# Patient Record
Sex: FEMALE | Race: WHITE | NOT HISPANIC OR LATINO | Employment: OTHER | URBAN - METROPOLITAN AREA
[De-identification: names, ages, dates, MRNs, and addresses within clinical notes are randomized per-mention and may not be internally consistent; named-entity substitution may affect disease eponyms.]

---

## 2023-08-23 ENCOUNTER — NURSING HOME VISIT (OUTPATIENT)
Dept: POST ACUTE CARE | Facility: EXTERNAL LOCATION | Age: 88
End: 2023-08-23
Payer: MEDICARE

## 2023-08-23 DIAGNOSIS — K21.9 GASTROESOPHAGEAL REFLUX DISEASE WITHOUT ESOPHAGITIS: ICD-10-CM

## 2023-08-23 DIAGNOSIS — S72.002S CLOSED FRACTURE OF LEFT HIP, SEQUELA: Primary | ICD-10-CM

## 2023-08-23 DIAGNOSIS — I25.83 CORONARY ARTERY DISEASE DUE TO LIPID RICH PLAQUE: ICD-10-CM

## 2023-08-23 DIAGNOSIS — I25.10 CORONARY ARTERY DISEASE DUE TO LIPID RICH PLAQUE: ICD-10-CM

## 2023-08-23 DIAGNOSIS — I48.91 ATRIAL FIBRILLATION, UNSPECIFIED TYPE (MULTI): ICD-10-CM

## 2023-08-23 DIAGNOSIS — I10 PRIMARY HYPERTENSION: ICD-10-CM

## 2023-08-23 PROCEDURE — 99305 1ST NF CARE MODERATE MDM 35: CPT | Performed by: STUDENT IN AN ORGANIZED HEALTH CARE EDUCATION/TRAINING PROGRAM

## 2023-08-23 NOTE — LETTER
Patient: Noemí Hinton  : 1928    Encounter Date: 2023    Subjective  Patient ID: Noemí Hinton is a 89 y.o. female.    Pt is a 89 year old female with CAD, HTN, HLD, afib, OA, GERD who presented to [facility] s/p fall. Patient was found to have a hip fracture after a mechanical fall. Underwent surgical fixation and had non complicated post operative course. Patient was admitted to snf for further management. On evaluation, patient is overall doing well. She regards no acute complaints. Working well with therapy.         Review of Systems   Constitutional: Negative.    HENT: Negative.     Respiratory: Negative.     Cardiovascular: Negative.    Gastrointestinal: Negative.    Musculoskeletal: Negative.    Neurological: Negative.    Psychiatric/Behavioral: Negative.         ObjectiveVitals Reviewed via facility EMR   Physical Exam  Constitutional:       General: She is not in acute distress.     Appearance: She is not ill-appearing.   HENT:      Mouth/Throat:      Mouth: Mucous membranes are moist.      Pharynx: Oropharynx is clear. No oropharyngeal exudate or posterior oropharyngeal erythema.   Eyes:      Pupils: Pupils are equal, round, and reactive to light.   Cardiovascular:      Rate and Rhythm: Normal rate and regular rhythm.      Heart sounds: No murmur heard.  Pulmonary:      Effort: Pulmonary effort is normal. No respiratory distress.      Breath sounds: No wheezing.   Abdominal:      General: Abdomen is flat. Bowel sounds are normal. There is no distension.      Palpations: Abdomen is soft.      Tenderness: There is no abdominal tenderness.   Musculoskeletal:         General: No tenderness. Normal range of motion.   Skin:     General: Skin is warm and dry.   Neurological:      General: No focal deficit present.      Mental Status: She is alert and oriented to person, place, and time. Mental status is at baseline.   Psychiatric:         Mood and Affect: Mood normal.          Assessment/Plan  Diagnoses and all orders for this visit:  Closed fracture of left hip, sequela  Gastroesophageal reflux disease without esophagitis  Atrial fibrillation, unspecified type (CMS/HCC)  Coronary artery disease due to lipid rich plaque  Primary hypertension    Pt seen and examined, overall medically stable, hospital course reviewed, continue pt/ot, supportive care, routine labs      Electronically Signed By: Talon Cullen DO   8/24/23  9:48 PM

## 2023-08-24 PROBLEM — I10 PRIMARY HYPERTENSION: Status: ACTIVE | Noted: 2023-08-24

## 2023-08-24 PROBLEM — I48.91 ATRIAL FIBRILLATION (MULTI): Status: ACTIVE | Noted: 2023-08-24

## 2023-08-24 PROBLEM — K21.9 GERD (GASTROESOPHAGEAL REFLUX DISEASE): Status: ACTIVE | Noted: 2023-08-24

## 2023-08-24 PROBLEM — I25.10 CORONARY ARTERY DISEASE DUE TO LIPID RICH PLAQUE: Status: ACTIVE | Noted: 2023-08-24

## 2023-08-24 PROBLEM — S72.002A HIP FRACTURE, LEFT (MULTI): Status: ACTIVE | Noted: 2023-08-24

## 2023-08-24 PROBLEM — I25.83 CORONARY ARTERY DISEASE DUE TO LIPID RICH PLAQUE: Status: ACTIVE | Noted: 2023-08-24

## 2023-08-24 ASSESSMENT — ENCOUNTER SYMPTOMS
GASTROINTESTINAL NEGATIVE: 1
RESPIRATORY NEGATIVE: 1
CONSTITUTIONAL NEGATIVE: 1
PSYCHIATRIC NEGATIVE: 1
CARDIOVASCULAR NEGATIVE: 1
MUSCULOSKELETAL NEGATIVE: 1
NEUROLOGICAL NEGATIVE: 1

## 2023-08-25 ENCOUNTER — NURSING HOME VISIT (OUTPATIENT)
Dept: POST ACUTE CARE | Facility: EXTERNAL LOCATION | Age: 88
End: 2023-08-25
Payer: MEDICARE

## 2023-08-25 DIAGNOSIS — I25.83 CORONARY ARTERY DISEASE DUE TO LIPID RICH PLAQUE: ICD-10-CM

## 2023-08-25 DIAGNOSIS — I48.91 ATRIAL FIBRILLATION, UNSPECIFIED TYPE (MULTI): ICD-10-CM

## 2023-08-25 DIAGNOSIS — S72.002S CLOSED FRACTURE OF LEFT HIP, SEQUELA: ICD-10-CM

## 2023-08-25 DIAGNOSIS — I25.10 CORONARY ARTERY DISEASE DUE TO LIPID RICH PLAQUE: ICD-10-CM

## 2023-08-25 DIAGNOSIS — K21.9 GASTROESOPHAGEAL REFLUX DISEASE WITHOUT ESOPHAGITIS: ICD-10-CM

## 2023-08-25 DIAGNOSIS — I10 PRIMARY HYPERTENSION: Primary | ICD-10-CM

## 2023-08-25 PROCEDURE — 99308 SBSQ NF CARE LOW MDM 20: CPT | Performed by: STUDENT IN AN ORGANIZED HEALTH CARE EDUCATION/TRAINING PROGRAM

## 2023-08-25 NOTE — LETTER
Patient: Noemí Hinton  : 1928    Encounter Date: 2023    Subjective  Patient ID: Noemí Hinton is a 89 y.o. female.    Pt seen and examined. Overall medically stable. She regards that she is working well with therapy and symptoms are overall stable. Pain well controlled. Heel pain is stable and was found to have heel spurs. Regards no additional issues.        Review of Systems   Constitutional: Negative.    HENT: Negative.     Respiratory: Negative.     Cardiovascular: Negative.    Gastrointestinal: Negative.    Musculoskeletal: Negative.    Neurological: Negative.    Psychiatric/Behavioral: Negative.         ObjectiveVitals Reviewed via facility EMR   Physical Exam  Constitutional:       General: She is not in acute distress.     Appearance: She is not ill-appearing.   HENT:      Mouth/Throat:      Mouth: Mucous membranes are moist.      Pharynx: Oropharynx is clear. No oropharyngeal exudate or posterior oropharyngeal erythema.   Eyes:      Pupils: Pupils are equal, round, and reactive to light.   Cardiovascular:      Rate and Rhythm: Normal rate and regular rhythm.      Heart sounds: No murmur heard.  Pulmonary:      Effort: Pulmonary effort is normal. No respiratory distress.      Breath sounds: No wheezing.   Abdominal:      General: Abdomen is flat. Bowel sounds are normal. There is no distension.      Palpations: Abdomen is soft.      Tenderness: There is no abdominal tenderness.   Musculoskeletal:         General: No tenderness. Normal range of motion.   Skin:     General: Skin is warm and dry.   Neurological:      General: No focal deficit present.      Mental Status: She is alert and oriented to person, place, and time. Mental status is at baseline.   Psychiatric:         Mood and Affect: Mood normal.         Assessment/Plan  Diagnoses and all orders for this visit:  Primary hypertension  Closed fracture of left hip, sequela  Gastroesophageal reflux disease without esophagitis  Atrial  fibrillation, unspecified type (CMS/HCC)  Coronary artery disease due to lipid rich plaque    pt seen and examined, overall medically stable. Advise follow up with podiatry due to heel spurs. Continue supportive care, routine labs, pt/ot        Electronically Signed By: Talon Cullen DO   8/27/23  8:15 PM

## 2023-08-25 NOTE — PROGRESS NOTES
Subjective   Patient ID: Noemí Hniton is a 89 y.o. female.    Pt is a 89 year old female with CAD, HTN, HLD, afib, OA, GERD who presented to [facility] s/p fall. Patient was found to have a hip fracture after a mechanical fall. Underwent surgical fixation and had non complicated post operative course. Patient was admitted to snf for further management. On evaluation, patient is overall doing well. She regards no acute complaints. Working well with therapy.         Review of Systems   Constitutional: Negative.    HENT: Negative.     Respiratory: Negative.     Cardiovascular: Negative.    Gastrointestinal: Negative.    Musculoskeletal: Negative.    Neurological: Negative.    Psychiatric/Behavioral: Negative.         Objective Vitals Reviewed via facility EMR   Physical Exam  Constitutional:       General: She is not in acute distress.     Appearance: She is not ill-appearing.   HENT:      Mouth/Throat:      Mouth: Mucous membranes are moist.      Pharynx: Oropharynx is clear. No oropharyngeal exudate or posterior oropharyngeal erythema.   Eyes:      Pupils: Pupils are equal, round, and reactive to light.   Cardiovascular:      Rate and Rhythm: Normal rate and regular rhythm.      Heart sounds: No murmur heard.  Pulmonary:      Effort: Pulmonary effort is normal. No respiratory distress.      Breath sounds: No wheezing.   Abdominal:      General: Abdomen is flat. Bowel sounds are normal. There is no distension.      Palpations: Abdomen is soft.      Tenderness: There is no abdominal tenderness.   Musculoskeletal:         General: No tenderness. Normal range of motion.   Skin:     General: Skin is warm and dry.   Neurological:      General: No focal deficit present.      Mental Status: She is alert and oriented to person, place, and time. Mental status is at baseline.   Psychiatric:         Mood and Affect: Mood normal.         Assessment/Plan   Diagnoses and all orders for this visit:  Closed fracture of  left hip, sequela  Gastroesophageal reflux disease without esophagitis  Atrial fibrillation, unspecified type (CMS/HCC)  Coronary artery disease due to lipid rich plaque  Primary hypertension    Pt seen and examined, overall medically stable, hospital course reviewed, continue pt/ot, supportive care, routine labs

## 2023-08-27 ASSESSMENT — ENCOUNTER SYMPTOMS
NEUROLOGICAL NEGATIVE: 1
CARDIOVASCULAR NEGATIVE: 1
CONSTITUTIONAL NEGATIVE: 1
GASTROINTESTINAL NEGATIVE: 1
MUSCULOSKELETAL NEGATIVE: 1
RESPIRATORY NEGATIVE: 1
PSYCHIATRIC NEGATIVE: 1

## 2023-08-28 NOTE — PROGRESS NOTES
Subjective   Patient ID: Noemí Hinton is a 89 y.o. female.    Pt seen and examined. Overall medically stable. She regards that she is working well with therapy and symptoms are overall stable. Pain well controlled. Heel pain is stable and was found to have heel spurs. Regards no additional issues.        Review of Systems   Constitutional: Negative.    HENT: Negative.     Respiratory: Negative.     Cardiovascular: Negative.    Gastrointestinal: Negative.    Musculoskeletal: Negative.    Neurological: Negative.    Psychiatric/Behavioral: Negative.         Objective Vitals Reviewed via facility EMR   Physical Exam  Constitutional:       General: She is not in acute distress.     Appearance: She is not ill-appearing.   HENT:      Mouth/Throat:      Mouth: Mucous membranes are moist.      Pharynx: Oropharynx is clear. No oropharyngeal exudate or posterior oropharyngeal erythema.   Eyes:      Pupils: Pupils are equal, round, and reactive to light.   Cardiovascular:      Rate and Rhythm: Normal rate and regular rhythm.      Heart sounds: No murmur heard.  Pulmonary:      Effort: Pulmonary effort is normal. No respiratory distress.      Breath sounds: No wheezing.   Abdominal:      General: Abdomen is flat. Bowel sounds are normal. There is no distension.      Palpations: Abdomen is soft.      Tenderness: There is no abdominal tenderness.   Musculoskeletal:         General: No tenderness. Normal range of motion.   Skin:     General: Skin is warm and dry.   Neurological:      General: No focal deficit present.      Mental Status: She is alert and oriented to person, place, and time. Mental status is at baseline.   Psychiatric:         Mood and Affect: Mood normal.         Assessment/Plan   Diagnoses and all orders for this visit:  Primary hypertension  Closed fracture of left hip, sequela  Gastroesophageal reflux disease without esophagitis  Atrial fibrillation, unspecified type (CMS/[facility])  Coronary artery disease due to  lipid rich plaque    pt seen and examined, overall medically stable. Advise follow up with podiatry due to heel spurs. Continue supportive care, routine labs, pt/ot

## 2023-08-30 ENCOUNTER — NURSING HOME VISIT (OUTPATIENT)
Dept: POST ACUTE CARE | Facility: EXTERNAL LOCATION | Age: 88
End: 2023-08-30
Payer: MEDICARE

## 2023-08-30 DIAGNOSIS — M25.512 LEFT SHOULDER PAIN, UNSPECIFIED CHRONICITY: ICD-10-CM

## 2023-08-30 DIAGNOSIS — K21.9 GASTROESOPHAGEAL REFLUX DISEASE WITHOUT ESOPHAGITIS: ICD-10-CM

## 2023-08-30 DIAGNOSIS — I10 PRIMARY HYPERTENSION: Primary | ICD-10-CM

## 2023-08-30 DIAGNOSIS — I48.91 ATRIAL FIBRILLATION, UNSPECIFIED TYPE (MULTI): ICD-10-CM

## 2023-08-30 DIAGNOSIS — S72.002S CLOSED FRACTURE OF LEFT HIP, SEQUELA: ICD-10-CM

## 2023-08-30 PROCEDURE — 99308 SBSQ NF CARE LOW MDM 20: CPT | Performed by: STUDENT IN AN ORGANIZED HEALTH CARE EDUCATION/TRAINING PROGRAM

## 2023-08-30 ASSESSMENT — ENCOUNTER SYMPTOMS
NEUROLOGICAL NEGATIVE: 1
MUSCULOSKELETAL NEGATIVE: 1
CARDIOVASCULAR NEGATIVE: 1
PSYCHIATRIC NEGATIVE: 1
CONSTITUTIONAL NEGATIVE: 1
RESPIRATORY NEGATIVE: 1
GASTROINTESTINAL NEGATIVE: 1

## 2023-08-30 NOTE — LETTER
Patient: Noemí Hinton  : 1928    Encounter Date: 2023    Subjective  Patient ID: Noemí Hinton is a 89 y.o. female.    Pt seen resting comfortably in bed. Regards she is working well with therapy but is having some shoulder pain. This is typical for her and she has gotten injections in the past for this. She states otherwise she has no complaints and overall feels well.        Review of Systems   Constitutional: Negative.    HENT: Negative.     Respiratory: Negative.     Cardiovascular: Negative.    Gastrointestinal: Negative.    Musculoskeletal: Negative.         Shoulder pain on L   Neurological: Negative.    Psychiatric/Behavioral: Negative.         ObjectiveVitals Reviewed via facility EMR   Physical Exam  Constitutional:       General: She is not in acute distress.     Appearance: She is not ill-appearing.   HENT:      Mouth/Throat:      Mouth: Mucous membranes are moist.      Pharynx: Oropharynx is clear. No oropharyngeal exudate or posterior oropharyngeal erythema.   Eyes:      Pupils: Pupils are equal, round, and reactive to light.   Cardiovascular:      Rate and Rhythm: Normal rate and regular rhythm.      Heart sounds: No murmur heard.  Pulmonary:      Effort: Pulmonary effort is normal. No respiratory distress.      Breath sounds: No wheezing.   Abdominal:      General: Abdomen is flat. Bowel sounds are normal. There is no distension.      Palpations: Abdomen is soft.      Tenderness: There is no abdominal tenderness.   Musculoskeletal:         General: No tenderness. Normal range of motion.      Comments: L shoulder pain otherwise generalized arthalgia with ROM   Skin:     General: Skin is warm and dry.   Neurological:      General: No focal deficit present.      Mental Status: She is alert and oriented to person, place, and time. Mental status is at baseline.   Psychiatric:         Mood and Affect: Mood normal.         Assessment/Plan  Diagnoses and all orders for this visit:  Primary  hypertension  Atrial fibrillation, unspecified type (CMS/HCC)  Gastroesophageal reflux disease without esophagitis  Closed fracture of left hip, sequela  Left shoulder pain, unspecified chronicity    Pt seen and examined, will obtain shoulder x-ray, lidocaine patch for L shoulder, referral to ortho for injection, PT/OT, otherwise medically stable      Electronically Signed By: Talon Cullen DO   8/30/23  8:43 PM

## 2023-09-06 ENCOUNTER — NURSING HOME VISIT (OUTPATIENT)
Dept: POST ACUTE CARE | Facility: EXTERNAL LOCATION | Age: 88
End: 2023-09-06
Payer: MEDICARE

## 2023-09-06 DIAGNOSIS — I25.10 CORONARY ARTERY DISEASE DUE TO LIPID RICH PLAQUE: ICD-10-CM

## 2023-09-06 DIAGNOSIS — S72.002S CLOSED FRACTURE OF LEFT HIP, SEQUELA: ICD-10-CM

## 2023-09-06 DIAGNOSIS — K21.9 GASTROESOPHAGEAL REFLUX DISEASE WITHOUT ESOPHAGITIS: ICD-10-CM

## 2023-09-06 DIAGNOSIS — R35.0 URINARY FREQUENCY: ICD-10-CM

## 2023-09-06 DIAGNOSIS — I48.91 ATRIAL FIBRILLATION, UNSPECIFIED TYPE (MULTI): ICD-10-CM

## 2023-09-06 DIAGNOSIS — I10 PRIMARY HYPERTENSION: Primary | ICD-10-CM

## 2023-09-06 DIAGNOSIS — M25.512 LEFT SHOULDER PAIN, UNSPECIFIED CHRONICITY: ICD-10-CM

## 2023-09-06 DIAGNOSIS — I25.83 CORONARY ARTERY DISEASE DUE TO LIPID RICH PLAQUE: ICD-10-CM

## 2023-09-06 DIAGNOSIS — R19.7 DIARRHEA, UNSPECIFIED TYPE: ICD-10-CM

## 2023-09-06 PROCEDURE — 99308 SBSQ NF CARE LOW MDM 20: CPT | Performed by: STUDENT IN AN ORGANIZED HEALTH CARE EDUCATION/TRAINING PROGRAM

## 2023-09-06 NOTE — LETTER
Patient: Noemí Hinton  : 1928    Encounter Date: 2023    Subjective  Patient ID: Noemí Hinton is a 89 y.o. female.    Patient seen resting comfortably in bed.  She reports that she is doing okay.  Has been having some issues with diarrhea as well as urinary frequency.  She feels fairly weak secondary to this.  Otherwise, is slowly getting better with therapy.  States no additional issues or concerns.        Review of Systems   Constitutional: Negative.    HENT: Negative.     Respiratory: Negative.     Cardiovascular: Negative.    Gastrointestinal:  Positive for diarrhea.   Genitourinary:  Positive for difficulty urinating and frequency.   Musculoskeletal: Negative.    Neurological: Negative.    Psychiatric/Behavioral: Negative.         ObjectiveVitals Reviewed via facility EMR   Physical Exam  Constitutional:       General: She is not in acute distress.     Appearance: She is not ill-appearing.      Comments: Elderly female   HENT:      Mouth/Throat:      Mouth: Mucous membranes are moist.      Pharynx: Oropharynx is clear. No oropharyngeal exudate or posterior oropharyngeal erythema.   Eyes:      Pupils: Pupils are equal, round, and reactive to light.   Cardiovascular:      Rate and Rhythm: Normal rate and regular rhythm.      Heart sounds: No murmur heard.  Pulmonary:      Effort: Pulmonary effort is normal. No respiratory distress.      Breath sounds: No wheezing.   Abdominal:      General: Abdomen is flat. Bowel sounds are normal. There is no distension.      Palpations: Abdomen is soft.      Tenderness: There is no abdominal tenderness.   Musculoskeletal:         General: No tenderness. Normal range of motion.   Skin:     General: Skin is warm and dry.   Neurological:      General: No focal deficit present.      Mental Status: She is alert and oriented to person, place, and time. Mental status is at baseline.   Psychiatric:         Mood and Affect: Mood normal.      Comments: anxious          Assessment/Plan  Diagnoses and all orders for this visit:  Primary hypertension  Atrial fibrillation, unspecified type (CMS/HCC)  Left shoulder pain, unspecified chronicity  Gastroesophageal reflux disease without esophagitis  Closed fracture of left hip, sequela  Coronary artery disease due to lipid rich plaque      Patient seen and examined, lab results reviewed and overall stable.  We will check a C. difficile as well as a urine dip to rule out any potential infectious etiology causing patient's GI discomfort although low suspicion as patient does not have a white count.  In addition due to persistent diarrhea, we will discontinue patient's bowel regimen.  Continue supportive care.  Routine labs.      Electronically Signed By: Talon Cullen DO   9/7/23  9:19 PM

## 2023-09-07 PROBLEM — R35.0 URINARY FREQUENCY: Status: ACTIVE | Noted: 2023-09-07

## 2023-09-07 PROBLEM — R19.7 DIARRHEA: Status: ACTIVE | Noted: 2023-09-07

## 2023-09-07 ASSESSMENT — ENCOUNTER SYMPTOMS
DIFFICULTY URINATING: 1
CARDIOVASCULAR NEGATIVE: 1
MUSCULOSKELETAL NEGATIVE: 1
RESPIRATORY NEGATIVE: 1
PSYCHIATRIC NEGATIVE: 1
FREQUENCY: 1
NEUROLOGICAL NEGATIVE: 1
DIARRHEA: 1
CONSTITUTIONAL NEGATIVE: 1

## 2023-09-08 NOTE — PROGRESS NOTES
Subjective   Patient ID: Noemí Hinton is a 89 y.o. female.    Patient seen resting comfortably in bed.  She reports that she is doing okay.  Has been having some issues with diarrhea as well as urinary frequency.  She feels fairly weak secondary to this.  Otherwise, is slowly getting better with therapy.  States no additional issues or concerns.        Review of Systems   Constitutional: Negative.    HENT: Negative.     Respiratory: Negative.     Cardiovascular: Negative.    Gastrointestinal:  Positive for diarrhea.   Genitourinary:  Positive for difficulty urinating and frequency.   Musculoskeletal: Negative.    Neurological: Negative.    Psychiatric/Behavioral: Negative.         Objective Vitals Reviewed via facility EMR   Physical Exam  Constitutional:       General: She is not in acute distress.     Appearance: She is not ill-appearing.      Comments: Elderly female   HENT:      Mouth/Throat:      Mouth: Mucous membranes are moist.      Pharynx: Oropharynx is clear. No oropharyngeal exudate or posterior oropharyngeal erythema.   Eyes:      Pupils: Pupils are equal, round, and reactive to light.   Cardiovascular:      Rate and Rhythm: Normal rate and regular rhythm.      Heart sounds: No murmur heard.  Pulmonary:      Effort: Pulmonary effort is normal. No respiratory distress.      Breath sounds: No wheezing.   Abdominal:      General: Abdomen is flat. Bowel sounds are normal. There is no distension.      Palpations: Abdomen is soft.      Tenderness: There is no abdominal tenderness.   Musculoskeletal:         General: No tenderness. Normal range of motion.   Skin:     General: Skin is warm and dry.   Neurological:      General: No focal deficit present.      Mental Status: She is alert and oriented to person, place, and time. Mental status is at baseline.   Psychiatric:         Mood and Affect: Mood normal.      Comments: anxious         Assessment/Plan   Diagnoses and all orders for this visit:  Primary  hypertension  Atrial fibrillation, unspecified type (CMS/HCC)  Left shoulder pain, unspecified chronicity  Gastroesophageal reflux disease without esophagitis  Closed fracture of left hip, sequela  Coronary artery disease due to lipid rich plaque      Patient seen and examined, lab results reviewed and overall stable.  We will check a C. difficile as well as a urine dip to rule out any potential infectious etiology causing patient's GI discomfort although low suspicion as patient does not have a white count.  In addition due to persistent diarrhea, we will discontinue patient's bowel regimen.  Continue supportive care.  Routine labs.

## 2023-09-11 ENCOUNTER — NURSING HOME VISIT (OUTPATIENT)
Dept: POST ACUTE CARE | Facility: EXTERNAL LOCATION | Age: 88
End: 2023-09-11
Payer: MEDICARE

## 2023-09-11 DIAGNOSIS — I48.91 ATRIAL FIBRILLATION, UNSPECIFIED TYPE (MULTI): ICD-10-CM

## 2023-09-11 DIAGNOSIS — F41.9 ANXIETY: ICD-10-CM

## 2023-09-11 DIAGNOSIS — M25.512 LEFT SHOULDER PAIN, UNSPECIFIED CHRONICITY: ICD-10-CM

## 2023-09-11 DIAGNOSIS — S72.002S CLOSED FRACTURE OF LEFT HIP, SEQUELA: ICD-10-CM

## 2023-09-11 DIAGNOSIS — I10 PRIMARY HYPERTENSION: Primary | ICD-10-CM

## 2023-09-11 PROCEDURE — 99309 SBSQ NF CARE MODERATE MDM 30: CPT | Performed by: STUDENT IN AN ORGANIZED HEALTH CARE EDUCATION/TRAINING PROGRAM

## 2023-09-11 NOTE — LETTER
Patient: Noemí Hinton  : 1928    Encounter Date: 2023    Subjective  Patient ID: Noemí Hinton is a 89 y.o. female.    Pt seen resting in bedside chair. She reports that she is doing ok but overall still feels weak. States she is slowly gaining back her strength but is frustrated with her progress. Regards that it makes her feel a bit down due to this. Regards no other acute issues or concerns.        Review of Systems   Constitutional: Negative.    HENT: Negative.     Respiratory: Negative.     Cardiovascular: Negative.    Gastrointestinal: Negative.    Musculoskeletal: Negative.    Neurological: Negative.    Psychiatric/Behavioral:  The patient is nervous/anxious.        ObjectiveVitals Reviewed via facility EMR   Physical Exam  Constitutional:       General: She is not in acute distress.     Appearance: She is not ill-appearing.   HENT:      Mouth/Throat:      Mouth: Mucous membranes are moist.      Pharynx: Oropharynx is clear. No oropharyngeal exudate or posterior oropharyngeal erythema.   Eyes:      Pupils: Pupils are equal, round, and reactive to light.   Cardiovascular:      Rate and Rhythm: Normal rate and regular rhythm.      Heart sounds: No murmur heard.  Pulmonary:      Effort: Pulmonary effort is normal. No respiratory distress.      Breath sounds: No wheezing.   Abdominal:      General: Abdomen is flat. Bowel sounds are normal. There is no distension.      Palpations: Abdomen is soft.      Tenderness: There is no abdominal tenderness.   Musculoskeletal:         General: No tenderness. Normal range of motion.   Skin:     General: Skin is warm and dry.   Neurological:      General: No focal deficit present.      Mental Status: She is alert and oriented to person, place, and time. Mental status is at baseline.   Psychiatric:         Mood and Affect: Mood normal.      Comments: Mildly worse mood and slightly agitated         Assessment/Plan  Diagnoses and all orders for this visit:  Primary  hypertension  Atrial fibrillation, unspecified type (CMS/HCC)  Left shoulder pain, unspecified chronicity  Closed fracture of left hip, sequela  Anxiety    Pt seen and examined, medically stable. Continue supportive care. Start scheduled lidocaine patches due to shoulder pain, may consider initiation of SSRI vs SNRI if patient still continues to have poor mood, closely monitor    >30 minutes spent in management of pt      Electronically Signed By: Talon Cullen DO   9/14/23  7:41 PM

## 2023-09-13 ENCOUNTER — NURSING HOME VISIT (OUTPATIENT)
Dept: POST ACUTE CARE | Facility: EXTERNAL LOCATION | Age: 88
End: 2023-09-13
Payer: MEDICARE

## 2023-09-13 DIAGNOSIS — S72.002S CLOSED FRACTURE OF LEFT HIP, SEQUELA: ICD-10-CM

## 2023-09-13 DIAGNOSIS — F41.9 ANXIETY: Primary | ICD-10-CM

## 2023-09-13 DIAGNOSIS — I48.91 ATRIAL FIBRILLATION, UNSPECIFIED TYPE (MULTI): ICD-10-CM

## 2023-09-13 DIAGNOSIS — M25.512 LEFT SHOULDER PAIN, UNSPECIFIED CHRONICITY: ICD-10-CM

## 2023-09-13 PROCEDURE — 99308 SBSQ NF CARE LOW MDM 20: CPT | Performed by: STUDENT IN AN ORGANIZED HEALTH CARE EDUCATION/TRAINING PROGRAM

## 2023-09-13 NOTE — LETTER
Patient: Noemí Hinton  : 1928    Encounter Date: 2023    Subjective  Patient ID: Noemí Hinton is a 89 y.o. female.    Pt seen and examined at bedside. She states that overall she is doing well without any complaints. Slowly working with therapy, is having some shoulder pain as well which is chronic. Regards no additional issues or concerns.        Review of Systems   Constitutional: Negative.    HENT: Negative.     Respiratory: Negative.     Cardiovascular: Negative.    Gastrointestinal: Negative.    Musculoskeletal: Negative.    Neurological: Negative.    Psychiatric/Behavioral: Negative.         ObjectiveVitals Reviewed via facility EMR   Physical Exam  Constitutional:       General: She is not in acute distress.     Appearance: She is not ill-appearing.   HENT:      Mouth/Throat:      Mouth: Mucous membranes are moist.      Pharynx: Oropharynx is clear. No oropharyngeal exudate or posterior oropharyngeal erythema.   Eyes:      Pupils: Pupils are equal, round, and reactive to light.   Cardiovascular:      Rate and Rhythm: Normal rate and regular rhythm.      Heart sounds: No murmur heard.  Pulmonary:      Effort: Pulmonary effort is normal. No respiratory distress.      Breath sounds: No wheezing.   Abdominal:      General: Abdomen is flat. Bowel sounds are normal. There is no distension.      Palpations: Abdomen is soft.      Tenderness: There is no abdominal tenderness.   Musculoskeletal:         General: No tenderness. Normal range of motion.   Skin:     General: Skin is warm and dry.   Neurological:      General: No focal deficit present.      Mental Status: She is alert and oriented to person, place, and time. Mental status is at baseline.   Psychiatric:         Mood and Affect: Mood normal.         Assessment/Plan  Diagnoses and all orders for this visit:  Anxiety  Left shoulder pain, unspecified chronicity  Closed fracture of left hip, sequela  Atrial fibrillation, unspecified type  (CMS/[facility])    Pt seen and examined, continue supportive care, labs overall stable, mood much improved since last visit, will closely monitor. continue pt/ot, no changes to meds      Electronically Signed By: Talon Cullen DO   9/14/23  9:20 PM

## 2023-09-14 PROBLEM — F41.9 ANXIETY: Status: ACTIVE | Noted: 2023-09-14

## 2023-09-14 ASSESSMENT — ENCOUNTER SYMPTOMS
MUSCULOSKELETAL NEGATIVE: 1
RESPIRATORY NEGATIVE: 1
NERVOUS/ANXIOUS: 1
CONSTITUTIONAL NEGATIVE: 1
PSYCHIATRIC NEGATIVE: 1
RESPIRATORY NEGATIVE: 1
NEUROLOGICAL NEGATIVE: 1
CONSTITUTIONAL NEGATIVE: 1
GASTROINTESTINAL NEGATIVE: 1
CARDIOVASCULAR NEGATIVE: 1
NEUROLOGICAL NEGATIVE: 1
CARDIOVASCULAR NEGATIVE: 1
MUSCULOSKELETAL NEGATIVE: 1
GASTROINTESTINAL NEGATIVE: 1

## 2023-09-14 NOTE — PROGRESS NOTES
Subjective   Patient ID: Noemí Hinton is a 89 y.o. female.    Pt seen resting in bedside chair. She reports that she is doing ok but overall still feels weak. States she is slowly gaining back her strength but is frustrated with her progress. Regards that it makes her feel a bit down due to this. Regards no other acute issues or concerns.        Review of Systems   Constitutional: Negative.    HENT: Negative.     Respiratory: Negative.     Cardiovascular: Negative.    Gastrointestinal: Negative.    Musculoskeletal: Negative.    Neurological: Negative.    Psychiatric/Behavioral:  The patient is nervous/anxious.        Objective Vitals Reviewed via facility EMR   Physical Exam  Constitutional:       General: She is not in acute distress.     Appearance: She is not ill-appearing.   HENT:      Mouth/Throat:      Mouth: Mucous membranes are moist.      Pharynx: Oropharynx is clear. No oropharyngeal exudate or posterior oropharyngeal erythema.   Eyes:      Pupils: Pupils are equal, round, and reactive to light.   Cardiovascular:      Rate and Rhythm: Normal rate and regular rhythm.      Heart sounds: No murmur heard.  Pulmonary:      Effort: Pulmonary effort is normal. No respiratory distress.      Breath sounds: No wheezing.   Abdominal:      General: Abdomen is flat. Bowel sounds are normal. There is no distension.      Palpations: Abdomen is soft.      Tenderness: There is no abdominal tenderness.   Musculoskeletal:         General: No tenderness. Normal range of motion.   Skin:     General: Skin is warm and dry.   Neurological:      General: No focal deficit present.      Mental Status: She is alert and oriented to person, place, and time. Mental status is at baseline.   Psychiatric:         Mood and Affect: Mood normal.      Comments: Mildly worse mood and slightly agitated         Assessment/Plan   Diagnoses and all orders for this visit:  Primary hypertension  Atrial fibrillation, unspecified type (CMS/HCC)  Left  shoulder pain, unspecified chronicity  Closed fracture of left hip, sequela  Anxiety    Pt seen and examined, medically stable. Continue supportive care. Start scheduled lidocaine patches due to shoulder pain, may consider initiation of SSRI vs SNRI if patient still continues to have poor mood, closely monitor    >30 minutes spent in management of pt

## 2023-09-15 NOTE — PROGRESS NOTES
Subjective   Patient ID: Noemí Hinton is a 89 y.o. female.    Pt seen and examined at bedside. She states that overall she is doing well without any complaints. Slowly working with therapy, is having some shoulder pain as well which is chronic. Regards no additional issues or concerns.        Review of Systems   Constitutional: Negative.    HENT: Negative.     Respiratory: Negative.     Cardiovascular: Negative.    Gastrointestinal: Negative.    Musculoskeletal: Negative.    Neurological: Negative.    Psychiatric/Behavioral: Negative.         Objective Vitals Reviewed via facility EMR   Physical Exam  Constitutional:       General: She is not in acute distress.     Appearance: She is not ill-appearing.   HENT:      Mouth/Throat:      Mouth: Mucous membranes are moist.      Pharynx: Oropharynx is clear. No oropharyngeal exudate or posterior oropharyngeal erythema.   Eyes:      Pupils: Pupils are equal, round, and reactive to light.   Cardiovascular:      Rate and Rhythm: Normal rate and regular rhythm.      Heart sounds: No murmur heard.  Pulmonary:      Effort: Pulmonary effort is normal. No respiratory distress.      Breath sounds: No wheezing.   Abdominal:      General: Abdomen is flat. Bowel sounds are normal. There is no distension.      Palpations: Abdomen is soft.      Tenderness: There is no abdominal tenderness.   Musculoskeletal:         General: No tenderness. Normal range of motion.   Skin:     General: Skin is warm and dry.   Neurological:      General: No focal deficit present.      Mental Status: She is alert and oriented to person, place, and time. Mental status is at baseline.   Psychiatric:         Mood and Affect: Mood normal.         Assessment/Plan   Diagnoses and all orders for this visit:  Anxiety  Left shoulder pain, unspecified chronicity  Closed fracture of left hip, sequela  Atrial fibrillation, unspecified type (CMS/[facility])    Pt seen and examined, continue supportive care, labs overall  stable, mood much improved since last visit, will closely monitor. continue pt/ot, no changes to meds

## 2023-09-18 ENCOUNTER — NURSING HOME VISIT (OUTPATIENT)
Dept: POST ACUTE CARE | Facility: EXTERNAL LOCATION | Age: 88
End: 2023-09-18
Payer: MEDICARE

## 2023-09-18 DIAGNOSIS — S72.002S CLOSED FRACTURE OF LEFT HIP, SEQUELA: ICD-10-CM

## 2023-09-18 DIAGNOSIS — T14.8XXA SKIN ABRASION: ICD-10-CM

## 2023-09-18 DIAGNOSIS — M25.512 LEFT SHOULDER PAIN, UNSPECIFIED CHRONICITY: ICD-10-CM

## 2023-09-18 DIAGNOSIS — K21.9 GASTROESOPHAGEAL REFLUX DISEASE WITHOUT ESOPHAGITIS: ICD-10-CM

## 2023-09-18 DIAGNOSIS — I48.91 ATRIAL FIBRILLATION, UNSPECIFIED TYPE (MULTI): Primary | ICD-10-CM

## 2023-09-18 DIAGNOSIS — I10 PRIMARY HYPERTENSION: ICD-10-CM

## 2023-09-18 PROCEDURE — 99308 SBSQ NF CARE LOW MDM 20: CPT | Performed by: STUDENT IN AN ORGANIZED HEALTH CARE EDUCATION/TRAINING PROGRAM

## 2023-09-18 NOTE — LETTER
Patient: Noemí Hinton  : 1928    Encounter Date: 2023    Progress Note    Subjective:  Noemí Hinton is a 89 y.o. year old female patient with PT seen and examined. She reports that she is doing well with therapy. Did have a fall over the weekend with a slight skin abrasion. She reports that she is still feeling weak and slowly improving. Regards no additional issues.        Objective  Vitals reviewed VIA Facility EMR    Review of Systems   Constitutional: Negative.    HENT: Negative.     Respiratory: Negative.     Cardiovascular: Negative.    Gastrointestinal: Negative.    Musculoskeletal: Negative.    Skin:  Positive for rash.        abraison   Neurological: Negative.    Psychiatric/Behavioral: Negative.            Physical Exam  Constitutional:       General: She is not in acute distress.     Appearance: She is not ill-appearing.   HENT:      Mouth/Throat:      Mouth: Mucous membranes are moist.      Pharynx: Oropharynx is clear. No oropharyngeal exudate or posterior oropharyngeal erythema.   Eyes:      Pupils: Pupils are equal, round, and reactive to light.   Cardiovascular:      Rate and Rhythm: Normal rate and regular rhythm.      Heart sounds: No murmur heard.  Pulmonary:      Effort: Pulmonary effort is normal. No respiratory distress.      Breath sounds: No wheezing.   Abdominal:      General: Abdomen is flat. Bowel sounds are normal. There is no distension.      Palpations: Abdomen is soft.      Tenderness: There is no abdominal tenderness.   Musculoskeletal:         General: No tenderness. Normal range of motion.   Skin:     General: Skin is warm and dry.      Comments: Slight skin tear   Neurological:      General: No focal deficit present.      Mental Status: She is alert and oriented to person, place, and time. Mental status is at baseline.   Psychiatric:         Mood and Affect: Mood normal.            Assessment/Plan  Diagnoses and all orders for this visit:  Atrial fibrillation,  unspecified type (CMS/[facility])  Closed fracture of left hip, sequela  Left shoulder pain, unspecified chronicity  Gastroesophageal reflux disease without esophagitis  Primary hypertension  Skin abrasion      PT seen and examined, medically stable, continue wound care for wound abrasion. Labs reviewed and overall stable. INR returned and overall 3.0, decreased Tuesday dosing to 2.5 mg due to recent increase of inr from 2.5 to 3.0.   continue PT/OT, labs reviewed. Staff updated on care plan      Patient fully evaluated as above:    Talon Cullen DO          Electronically Signed By: Talon Cullen DO   9/19/23  9:51 AM

## 2023-09-19 PROBLEM — T14.8XXA SKIN ABRASION: Status: ACTIVE | Noted: 2023-09-19

## 2023-09-19 ASSESSMENT — ENCOUNTER SYMPTOMS
CONSTITUTIONAL NEGATIVE: 1
MUSCULOSKELETAL NEGATIVE: 1
RESPIRATORY NEGATIVE: 1
NEUROLOGICAL NEGATIVE: 1
GASTROINTESTINAL NEGATIVE: 1
PSYCHIATRIC NEGATIVE: 1
CARDIOVASCULAR NEGATIVE: 1

## 2023-09-19 NOTE — PROGRESS NOTES
Progress Note    Subjective:  Noemí Hinton is a 89 y.o. year old female patient with PT seen and examined. She reports that she is doing well with therapy. Did have a fall over the weekend with a slight skin abrasion. She reports that she is still feeling weak and slowly improving. Regards no additional issues.        Objective   Vitals reviewed VIA Facility EMR    Review of Systems   Constitutional: Negative.    HENT: Negative.     Respiratory: Negative.     Cardiovascular: Negative.    Gastrointestinal: Negative.    Musculoskeletal: Negative.    Skin:  Positive for rash.        abraison   Neurological: Negative.    Psychiatric/Behavioral: Negative.            Physical Exam  Constitutional:       General: She is not in acute distress.     Appearance: She is not ill-appearing.   HENT:      Mouth/Throat:      Mouth: Mucous membranes are moist.      Pharynx: Oropharynx is clear. No oropharyngeal exudate or posterior oropharyngeal erythema.   Eyes:      Pupils: Pupils are equal, round, and reactive to light.   Cardiovascular:      Rate and Rhythm: Normal rate and regular rhythm.      Heart sounds: No murmur heard.  Pulmonary:      Effort: Pulmonary effort is normal. No respiratory distress.      Breath sounds: No wheezing.   Abdominal:      General: Abdomen is flat. Bowel sounds are normal. There is no distension.      Palpations: Abdomen is soft.      Tenderness: There is no abdominal tenderness.   Musculoskeletal:         General: No tenderness. Normal range of motion.   Skin:     General: Skin is warm and dry.      Comments: Slight skin tear   Neurological:      General: No focal deficit present.      Mental Status: She is alert and oriented to person, place, and time. Mental status is at baseline.   Psychiatric:         Mood and Affect: Mood normal.            Assessment/Plan   Diagnoses and all orders for this visit:  Atrial fibrillation, unspecified type (CMS/HCC)  Closed fracture of left hip, sequela  Left  shoulder pain, unspecified chronicity  Gastroesophageal reflux disease without esophagitis  Primary hypertension  Skin abrasion      PT seen and examined, medically stable, continue wound care for wound abrasion. Labs reviewed and overall stable. INR returned and overall 3.0, decreased Tuesday dosing to 2.5 mg due to recent increase of inr from 2.5 to 3.0.   continue PT/OT, labs reviewed. Staff updated on care plan      Patient fully evaluated as above:    Talon Cullen DO

## 2023-09-20 ENCOUNTER — NURSING HOME VISIT (OUTPATIENT)
Dept: POST ACUTE CARE | Facility: EXTERNAL LOCATION | Age: 88
End: 2023-09-20
Payer: MEDICARE

## 2023-09-20 DIAGNOSIS — I48.91 ATRIAL FIBRILLATION, UNSPECIFIED TYPE (MULTI): ICD-10-CM

## 2023-09-20 DIAGNOSIS — S72.002S CLOSED FRACTURE OF LEFT HIP, SEQUELA: ICD-10-CM

## 2023-09-20 DIAGNOSIS — F41.9 ANXIETY: ICD-10-CM

## 2023-09-20 DIAGNOSIS — K21.9 GASTROESOPHAGEAL REFLUX DISEASE WITHOUT ESOPHAGITIS: ICD-10-CM

## 2023-09-20 DIAGNOSIS — G62.9 NEUROPATHY: ICD-10-CM

## 2023-09-20 DIAGNOSIS — T14.8XXA SKIN ABRASION: ICD-10-CM

## 2023-09-20 DIAGNOSIS — I10 PRIMARY HYPERTENSION: Primary | ICD-10-CM

## 2023-09-20 PROCEDURE — 99308 SBSQ NF CARE LOW MDM 20: CPT | Performed by: STUDENT IN AN ORGANIZED HEALTH CARE EDUCATION/TRAINING PROGRAM

## 2023-09-20 NOTE — LETTER
Patient: Noemí Hinton  : 1928    Encounter Date: 2023    Subjective  Patient ID: Noemí Hinton is a 89 y.o. female.    Patient seen and evaluated at bedside.  She states that overall she is feeling much better, she is now taking her homeopathic medicine for neuropathy and feels much improved.  Therapy is slow-growing.  States no additional issues or concerns.        Review of Systems   Constitutional: Negative.         Improved mood   HENT: Negative.     Respiratory: Negative.     Cardiovascular: Negative.    Gastrointestinal: Negative.    Musculoskeletal: Negative.    Neurological: Negative.    Psychiatric/Behavioral: Negative.         ObjectiveVitals Reviewed via facility EMR   Physical Exam  Constitutional:       General: She is not in acute distress.     Appearance: She is not ill-appearing.      Comments: Elderly female resting in bed   Eyes:      Pupils: Pupils are equal, round, and reactive to light.   Cardiovascular:      Rate and Rhythm: Normal rate and regular rhythm.      Pulses: Normal pulses.      Heart sounds: No murmur heard.  Pulmonary:      Effort: No respiratory distress.      Breath sounds: No wheezing.   Abdominal:      General: Abdomen is flat. Bowel sounds are normal. There is no distension.   Musculoskeletal:      Right lower leg: No edema.      Left lower leg: No edema.   Skin:     General: Skin is warm and dry.   Neurological:      Mental Status: She is alert. Mental status is at baseline.      Cranial Nerves: No cranial nerve deficit.      Motor: No weakness.   Psychiatric:         Mood and Affect: Mood normal.         Behavior: Behavior normal.         Assessment/Plan  Diagnoses and all orders for this visit:  Primary hypertension  Atrial fibrillation, unspecified type (CMS/HCC)  Gastroesophageal reflux disease without esophagitis  Closed fracture of left hip, sequela  Skin abrasion  Anxiety  Neuropathy    Patient seen and examined overall medically stable continue  supportive care appreciate PT OT recommendations.  Continue on current medications.    Talon Cullen DO       Electronically Signed By: Talon Cullen DO   9/21/23  7:21 PM

## 2023-09-21 PROBLEM — G62.9 NEUROPATHY: Status: ACTIVE | Noted: 2023-09-21

## 2023-09-21 ASSESSMENT — ENCOUNTER SYMPTOMS
CARDIOVASCULAR NEGATIVE: 1
MUSCULOSKELETAL NEGATIVE: 1
GASTROINTESTINAL NEGATIVE: 1
CONSTITUTIONAL NEGATIVE: 1
RESPIRATORY NEGATIVE: 1
PSYCHIATRIC NEGATIVE: 1
NEUROLOGICAL NEGATIVE: 1

## 2023-09-21 NOTE — PROGRESS NOTES
Subjective   Patient ID: Noemí Hinton is a 89 y.o. female.    Patient seen and evaluated at bedside.  She states that overall she is feeling much better, she is now taking her homeopathic medicine for neuropathy and feels much improved.  Therapy is slow-growing.  States no additional issues or concerns.        Review of Systems   Constitutional: Negative.         Improved mood   HENT: Negative.     Respiratory: Negative.     Cardiovascular: Negative.    Gastrointestinal: Negative.    Musculoskeletal: Negative.    Neurological: Negative.    Psychiatric/Behavioral: Negative.         Objective Vitals Reviewed via facility EMR   Physical Exam  Constitutional:       General: She is not in acute distress.     Appearance: She is not ill-appearing.      Comments: Elderly female resting in bed   Eyes:      Pupils: Pupils are equal, round, and reactive to light.   Cardiovascular:      Rate and Rhythm: Normal rate and regular rhythm.      Pulses: Normal pulses.      Heart sounds: No murmur heard.  Pulmonary:      Effort: No respiratory distress.      Breath sounds: No wheezing.   Abdominal:      General: Abdomen is flat. Bowel sounds are normal. There is no distension.   Musculoskeletal:      Right lower leg: No edema.      Left lower leg: No edema.   Skin:     General: Skin is warm and dry.   Neurological:      Mental Status: She is alert. Mental status is at baseline.      Cranial Nerves: No cranial nerve deficit.      Motor: No weakness.   Psychiatric:         Mood and Affect: Mood normal.         Behavior: Behavior normal.         Assessment/Plan   Diagnoses and all orders for this visit:  Primary hypertension  Atrial fibrillation, unspecified type (CMS/HCC)  Gastroesophageal reflux disease without esophagitis  Closed fracture of left hip, sequela  Skin abrasion  Anxiety  Neuropathy    Patient seen and examined overall medically stable continue supportive care appreciate PT OT recommendations.  Continue on current  medications.    Talon Cullen DO

## 2023-09-27 ENCOUNTER — NURSING HOME VISIT (OUTPATIENT)
Dept: POST ACUTE CARE | Facility: EXTERNAL LOCATION | Age: 88
End: 2023-09-27
Payer: MEDICARE

## 2023-09-27 DIAGNOSIS — F41.9 ANXIETY: Primary | ICD-10-CM

## 2023-09-27 DIAGNOSIS — M25.512 LEFT SHOULDER PAIN, UNSPECIFIED CHRONICITY: ICD-10-CM

## 2023-09-27 DIAGNOSIS — I48.91 ATRIAL FIBRILLATION, UNSPECIFIED TYPE (MULTI): ICD-10-CM

## 2023-09-27 DIAGNOSIS — I25.10 CORONARY ARTERY DISEASE DUE TO LIPID RICH PLAQUE: ICD-10-CM

## 2023-09-27 DIAGNOSIS — G62.9 NEUROPATHY: ICD-10-CM

## 2023-09-27 DIAGNOSIS — I25.83 CORONARY ARTERY DISEASE DUE TO LIPID RICH PLAQUE: ICD-10-CM

## 2023-09-27 PROCEDURE — 99308 SBSQ NF CARE LOW MDM 20: CPT | Performed by: STUDENT IN AN ORGANIZED HEALTH CARE EDUCATION/TRAINING PROGRAM

## 2023-09-27 NOTE — LETTER
Patient: Noemí Hinton  : 1928    Encounter Date: 2023    Subjective  Patient ID: Noemí Hinton is a 89 y.o. female.    PT seen and examined. States overall is doing well. Her neuropathy symptoms are overall improving with the current medications. Strength slowly improving. Regards no additional issues.        Review of Systems   Constitutional:  Negative for activity change and fever.   Respiratory:  Negative for chest tightness and shortness of breath.    Cardiovascular:  Negative for chest pain, palpitations and leg swelling.   Gastrointestinal:  Negative for abdominal distention, abdominal pain, diarrhea and nausea.   Musculoskeletal:  Negative for arthralgias.   Neurological:  Negative for dizziness and syncope.   Psychiatric/Behavioral:  Negative for agitation and behavioral problems.        ObjectiveVitals Reviewed via facility EMR   Physical Exam  Constitutional:       General: She is not in acute distress.     Appearance: She is not ill-appearing.      Comments: Elderly female in NAD   Eyes:      Pupils: Pupils are equal, round, and reactive to light.   Cardiovascular:      Rate and Rhythm: Normal rate and regular rhythm.      Pulses: Normal pulses.      Heart sounds: No murmur heard.  Pulmonary:      Effort: No respiratory distress.      Breath sounds: No wheezing.   Abdominal:      General: Abdomen is flat. Bowel sounds are normal. There is no distension.   Musculoskeletal:      Right lower leg: No edema.      Left lower leg: No edema.   Skin:     General: Skin is warm and dry.   Neurological:      Mental Status: She is alert. Mental status is at baseline.      Cranial Nerves: No cranial nerve deficit.      Motor: No weakness.   Psychiatric:         Mood and Affect: Mood normal.         Behavior: Behavior normal.         Assessment/Plan  Diagnoses and all orders for this visit:  Anxiety  Left shoulder pain, unspecified chronicity  Neuropathy  Atrial fibrillation, unspecified type  (CMS/HCC)  Coronary artery disease due to lipid rich plaque  Pt seen and examined, overall medically stable continue supportive care, routine labs, continue pt/ot      Talon Cullen DO       Electronically Signed By: Talon Cullen DO   9/28/23  7:25 PM

## 2023-09-28 ASSESSMENT — ENCOUNTER SYMPTOMS
DIARRHEA: 0
ARTHRALGIAS: 0
CHEST TIGHTNESS: 0
ABDOMINAL DISTENTION: 0
ABDOMINAL PAIN: 0
DIZZINESS: 0
NAUSEA: 0
PALPITATIONS: 0
AGITATION: 0
FEVER: 0
SHORTNESS OF BREATH: 0
ACTIVITY CHANGE: 0

## 2023-09-28 NOTE — PROGRESS NOTES
Subjective   Patient ID: Noemí Hinton is a 89 y.o. female.    PT seen and examined. States overall is doing well. Her neuropathy symptoms are overall improving with the current medications. Strength slowly improving. Regards no additional issues.        Review of Systems   Constitutional:  Negative for activity change and fever.   Respiratory:  Negative for chest tightness and shortness of breath.    Cardiovascular:  Negative for chest pain, palpitations and leg swelling.   Gastrointestinal:  Negative for abdominal distention, abdominal pain, diarrhea and nausea.   Musculoskeletal:  Negative for arthralgias.   Neurological:  Negative for dizziness and syncope.   Psychiatric/Behavioral:  Negative for agitation and behavioral problems.        Objective Vitals Reviewed via facility EMR   Physical Exam  Constitutional:       General: She is not in acute distress.     Appearance: She is not ill-appearing.      Comments: Elderly female in NAD   Eyes:      Pupils: Pupils are equal, round, and reactive to light.   Cardiovascular:      Rate and Rhythm: Normal rate and regular rhythm.      Pulses: Normal pulses.      Heart sounds: No murmur heard.  Pulmonary:      Effort: No respiratory distress.      Breath sounds: No wheezing.   Abdominal:      General: Abdomen is flat. Bowel sounds are normal. There is no distension.   Musculoskeletal:      Right lower leg: No edema.      Left lower leg: No edema.   Skin:     General: Skin is warm and dry.   Neurological:      Mental Status: She is alert. Mental status is at baseline.      Cranial Nerves: No cranial nerve deficit.      Motor: No weakness.   Psychiatric:         Mood and Affect: Mood normal.         Behavior: Behavior normal.         Assessment/Plan   Diagnoses and all orders for this visit:  Anxiety  Left shoulder pain, unspecified chronicity  Neuropathy  Atrial fibrillation, unspecified type (CMS/HCC)  Coronary artery disease due to lipid rich plaque  Pt seen and  examined, overall medically stable continue supportive care, routine labs, continue pt/ot      Talon Cullen DO

## 2023-10-02 ENCOUNTER — NURSING HOME VISIT (OUTPATIENT)
Dept: POST ACUTE CARE | Facility: EXTERNAL LOCATION | Age: 88
End: 2023-10-02
Payer: MEDICARE

## 2023-10-02 DIAGNOSIS — G62.9 NEUROPATHY: ICD-10-CM

## 2023-10-02 DIAGNOSIS — M25.512 LEFT SHOULDER PAIN, UNSPECIFIED CHRONICITY: ICD-10-CM

## 2023-10-02 DIAGNOSIS — I48.91 ATRIAL FIBRILLATION, UNSPECIFIED TYPE (MULTI): ICD-10-CM

## 2023-10-02 DIAGNOSIS — I10 PRIMARY HYPERTENSION: Primary | ICD-10-CM

## 2023-10-02 DIAGNOSIS — F41.9 ANXIETY: ICD-10-CM

## 2023-10-02 PROCEDURE — 99309 SBSQ NF CARE MODERATE MDM 30: CPT | Performed by: STUDENT IN AN ORGANIZED HEALTH CARE EDUCATION/TRAINING PROGRAM

## 2023-10-02 ASSESSMENT — ENCOUNTER SYMPTOMS
CARDIOVASCULAR NEGATIVE: 1
WEAKNESS: 1
MUSCULOSKELETAL NEGATIVE: 1
RESPIRATORY NEGATIVE: 1
GASTROINTESTINAL NEGATIVE: 1
PSYCHIATRIC NEGATIVE: 1
CONSTITUTIONAL NEGATIVE: 1

## 2023-10-02 NOTE — LETTER
Patient: Noemí Hinton  : 1928    Encounter Date: 10/02/2023    Subjective  Patient ID: Noemí Hinton is a 89 y.o. female.    Pt seen and evaluated at bedside. She reports that she is doing ok. Slowly improving with therapy and getting better walking through the halls. Continues to have issues with neuropathy. States that it is very bothersome and debilitating. Otherwise, reports no acute issues or concerns and feels overall well. No additional issues.         Review of Systems   Constitutional: Negative.    HENT: Negative.     Respiratory: Negative.     Cardiovascular: Negative.    Gastrointestinal: Negative.    Musculoskeletal: Negative.    Neurological:  Positive for weakness.        Neuropathy   Psychiatric/Behavioral: Negative.         ObjectiveVitals Reviewed via facility EMR   Physical Exam  Constitutional:       General: She is not in acute distress.     Appearance: She is not ill-appearing.   Eyes:      Pupils: Pupils are equal, round, and reactive to light.   Cardiovascular:      Rate and Rhythm: Normal rate and regular rhythm.      Pulses: Normal pulses.      Heart sounds: No murmur heard.  Pulmonary:      Effort: No respiratory distress.      Breath sounds: No wheezing.   Abdominal:      General: Abdomen is flat. Bowel sounds are normal. There is no distension.   Musculoskeletal:      Right lower leg: No edema.      Left lower leg: No edema.      Comments: Pain in LE   Skin:     General: Skin is warm and dry.   Neurological:      Mental Status: She is alert. Mental status is at baseline.      Cranial Nerves: No cranial nerve deficit.      Motor: No weakness.   Psychiatric:         Mood and Affect: Mood normal.         Behavior: Behavior normal.         Assessment/Plan  Diagnoses and all orders for this visit:  Primary hypertension  Atrial fibrillation, unspecified type (CMS/HCC)  Anxiety  Left shoulder pain, unspecified chronicity  Neuropathy    Pt overall medically stable, recommend increasing  gabapentin to 600 mg at bedtime. Check Iron studies and B12 as well as TSH as this could be related. Encourage activity and continued use of therapy. Continue supprotive care, Pt/OT. Labs overall stable    >30 minutes spent in management of pt    Talon Cullen DO       Electronically Signed By: Talon Cullen DO   10/2/23  9:44 PM

## 2023-10-03 NOTE — PROGRESS NOTES
Subjective   Patient ID: Noemí Hinton is a 89 y.o. female.    Pt seen and evaluated at bedside. She reports that she is doing ok. Slowly improving with therapy and getting better walking through the halls. Continues to have issues with neuropathy. States that it is very bothersome and debilitating. Otherwise, reports no acute issues or concerns and feels overall well. No additional issues.         Review of Systems   Constitutional: Negative.    HENT: Negative.     Respiratory: Negative.     Cardiovascular: Negative.    Gastrointestinal: Negative.    Musculoskeletal: Negative.    Neurological:  Positive for weakness.        Neuropathy   Psychiatric/Behavioral: Negative.         Objective Vitals Reviewed via facility EMR   Physical Exam  Constitutional:       General: She is not in acute distress.     Appearance: She is not ill-appearing.   Eyes:      Pupils: Pupils are equal, round, and reactive to light.   Cardiovascular:      Rate and Rhythm: Normal rate and regular rhythm.      Pulses: Normal pulses.      Heart sounds: No murmur heard.  Pulmonary:      Effort: No respiratory distress.      Breath sounds: No wheezing.   Abdominal:      General: Abdomen is flat. Bowel sounds are normal. There is no distension.   Musculoskeletal:      Right lower leg: No edema.      Left lower leg: No edema.      Comments: Pain in LE   Skin:     General: Skin is warm and dry.   Neurological:      Mental Status: She is alert. Mental status is at baseline.      Cranial Nerves: No cranial nerve deficit.      Motor: No weakness.   Psychiatric:         Mood and Affect: Mood normal.         Behavior: Behavior normal.         Assessment/Plan   Diagnoses and all orders for this visit:  Primary hypertension  Atrial fibrillation, unspecified type (CMS/HCC)  Anxiety  Left shoulder pain, unspecified chronicity  Neuropathy    Pt overall medically stable, recommend increasing gabapentin to 600 mg at bedtime. Check Iron studies and B12 as well as  TSH as this could be related. Encourage activity and continued use of therapy. Continue supprotive care, Pt/OT. Labs overall stable    >30 minutes spent in management of pt    Talon Cullen DO

## 2023-10-10 ENCOUNTER — NURSING HOME VISIT (OUTPATIENT)
Dept: POST ACUTE CARE | Facility: EXTERNAL LOCATION | Age: 88
End: 2023-10-10
Payer: MEDICARE

## 2023-10-10 DIAGNOSIS — G62.9 NEUROPATHY: ICD-10-CM

## 2023-10-10 DIAGNOSIS — K21.9 GASTROESOPHAGEAL REFLUX DISEASE WITHOUT ESOPHAGITIS: ICD-10-CM

## 2023-10-10 DIAGNOSIS — I48.91 ATRIAL FIBRILLATION, UNSPECIFIED TYPE (MULTI): ICD-10-CM

## 2023-10-10 DIAGNOSIS — R79.1 SUPRATHERAPEUTIC INR: ICD-10-CM

## 2023-10-10 DIAGNOSIS — I10 PRIMARY HYPERTENSION: Primary | ICD-10-CM

## 2023-10-10 PROCEDURE — 99308 SBSQ NF CARE LOW MDM 20: CPT | Performed by: STUDENT IN AN ORGANIZED HEALTH CARE EDUCATION/TRAINING PROGRAM

## 2023-10-10 ASSESSMENT — ENCOUNTER SYMPTOMS
ARTHRALGIAS: 1
NEUROLOGICAL NEGATIVE: 1
GASTROINTESTINAL NEGATIVE: 1
RESPIRATORY NEGATIVE: 1
CARDIOVASCULAR NEGATIVE: 1
PSYCHIATRIC NEGATIVE: 1
CONSTITUTIONAL NEGATIVE: 1

## 2023-10-11 NOTE — PROGRESS NOTES
Subjective   Patient ID: Noemí Hinton is a 89 y.o. female.    Pt seen resting comfortably in bed. She reports that she is overall doing well without any complaitns. Noted to have elevated INR on recent draw. Still having some shoulder pain as well. Regards no additional issues at this time. Is palnning on being discharged in next 24 hours.         Review of Systems   Constitutional: Negative.    HENT: Negative.     Respiratory: Negative.     Cardiovascular: Negative.    Gastrointestinal: Negative.    Musculoskeletal:  Positive for arthralgias.        Shoulder pain, chronic   Neurological: Negative.    Psychiatric/Behavioral: Negative.         Objective Vitals Reviewed via facility EMR   Physical Exam  Constitutional:       General: She is not in acute distress.     Appearance: She is not ill-appearing.   HENT:      Mouth/Throat:      Mouth: Mucous membranes are moist.      Pharynx: Oropharynx is clear. No oropharyngeal exudate or posterior oropharyngeal erythema.   Eyes:      Pupils: Pupils are equal, round, and reactive to light.   Cardiovascular:      Rate and Rhythm: Normal rate and regular rhythm.      Heart sounds: No murmur heard.  Pulmonary:      Effort: Pulmonary effort is normal. No respiratory distress.      Breath sounds: No wheezing.   Abdominal:      General: Abdomen is flat. Bowel sounds are normal. There is no distension.      Palpations: Abdomen is soft.      Tenderness: There is no abdominal tenderness.   Musculoskeletal:         General: No tenderness. Normal range of motion.   Skin:     General: Skin is warm and dry.   Neurological:      General: No focal deficit present.      Mental Status: She is alert and oriented to person, place, and time. Mental status is at baseline.   Psychiatric:         Mood and Affect: Mood normal.         Assessment/Plan   Diagnoses and all orders for this visit:  Primary hypertension  Atrial fibrillation, unspecified type (CMS/HCC)  Neuropathy  Gastroesophageal  reflux disease without esophagitis  Supratherapeutic INR    Pt seen and examined, medically stable. Hold coumadin and restart at lower doses and this was discussed with staff. Recommend close follow up with pcp for management of pt/inr after discharge. Patient having no signs of bleeding, medically stable for discharge    Talon Cullen DO

## 2023-10-13 ENCOUNTER — NURSING HOME VISIT (OUTPATIENT)
Dept: POST ACUTE CARE | Facility: EXTERNAL LOCATION | Age: 88
End: 2023-10-13
Payer: MEDICARE

## 2023-10-13 DIAGNOSIS — S72.002S CLOSED FRACTURE OF LEFT HIP, SEQUELA: ICD-10-CM

## 2023-10-13 DIAGNOSIS — I48.91 ATRIAL FIBRILLATION, UNSPECIFIED TYPE (MULTI): ICD-10-CM

## 2023-10-13 DIAGNOSIS — R79.1 SUPRATHERAPEUTIC INR: ICD-10-CM

## 2023-10-13 DIAGNOSIS — K21.9 GASTROESOPHAGEAL REFLUX DISEASE WITHOUT ESOPHAGITIS: ICD-10-CM

## 2023-10-13 DIAGNOSIS — I10 PRIMARY HYPERTENSION: Primary | ICD-10-CM

## 2023-10-13 PROCEDURE — 99308 SBSQ NF CARE LOW MDM 20: CPT | Performed by: STUDENT IN AN ORGANIZED HEALTH CARE EDUCATION/TRAINING PROGRAM

## 2023-10-13 NOTE — LETTER
Patient: Noemí Hinton  : 1928    Encounter Date: 10/13/2023    Subjective  Patient ID: Noemí Hinton is a 89 y.o. female.    Pt seen and examined resting in bed. She reports that she is doing well with no complaints. Pain and neuropathy are overall improved and have essentially resolved. She reports that her strength is overall stable. States no acute issues or concerns.         Review of Systems   Constitutional:  Negative for activity change and fever.   Respiratory:  Negative for chest tightness and shortness of breath.    Cardiovascular:  Negative for chest pain, palpitations and leg swelling.   Gastrointestinal:  Negative for abdominal distention, abdominal pain, diarrhea and nausea.   Musculoskeletal:  Negative for arthralgias.   Neurological:  Negative for dizziness and syncope.   Psychiatric/Behavioral:  Negative for agitation and behavioral problems.        ObjectiveVitals Reviewed via facility EMR   Physical Exam  Constitutional:       General: She is not in acute distress.     Appearance: She is not ill-appearing.   Eyes:      Pupils: Pupils are equal, round, and reactive to light.   Cardiovascular:      Rate and Rhythm: Normal rate and regular rhythm.      Pulses: Normal pulses.      Heart sounds: No murmur heard.  Pulmonary:      Effort: No respiratory distress.      Breath sounds: No wheezing.   Abdominal:      General: Abdomen is flat. Bowel sounds are normal. There is no distension.   Musculoskeletal:      Right lower leg: No edema.      Left lower leg: No edema.   Skin:     General: Skin is warm and dry.   Neurological:      Mental Status: She is alert. Mental status is at baseline.      Cranial Nerves: No cranial nerve deficit.      Motor: No weakness.   Psychiatric:         Mood and Affect: Mood normal.         Behavior: Behavior normal.         Assessment/Plan  Diagnoses and all orders for this visit:  Primary hypertension  Atrial fibrillation, unspecified type (CMS/HCC)  Closed fracture  of left hip, sequela  Gastroesophageal reflux disease without esophagitis  Supratherapeutic INR    Pt overall medically stable. Labs unremakrable, no changes to medications. If patient is needing discharge from facility she is medically clear. Dispo planning underway.    Talon Cullen DO         Electronically Signed By: Talon Cullen DO   10/15/23  1:11 PM

## 2023-10-15 ASSESSMENT — ENCOUNTER SYMPTOMS
PALPITATIONS: 0
AGITATION: 0
NAUSEA: 0
DIZZINESS: 0
ARTHRALGIAS: 0
ABDOMINAL DISTENTION: 0
DIARRHEA: 0
FEVER: 0
ABDOMINAL PAIN: 0
ACTIVITY CHANGE: 0
SHORTNESS OF BREATH: 0
CHEST TIGHTNESS: 0

## 2023-10-15 NOTE — PROGRESS NOTES
Subjective   Patient ID: Noemí Hinton is a 89 y.o. female.    Pt seen and examined resting in bed. She reports that she is doing well with no complaints. Pain and neuropathy are overall improved and have essentially resolved. She reports that her strength is overall stable. States no acute issues or concerns.         Review of Systems   Constitutional:  Negative for activity change and fever.   Respiratory:  Negative for chest tightness and shortness of breath.    Cardiovascular:  Negative for chest pain, palpitations and leg swelling.   Gastrointestinal:  Negative for abdominal distention, abdominal pain, diarrhea and nausea.   Musculoskeletal:  Negative for arthralgias.   Neurological:  Negative for dizziness and syncope.   Psychiatric/Behavioral:  Negative for agitation and behavioral problems.        Objective Vitals Reviewed via facility EMR   Physical Exam  Constitutional:       General: She is not in acute distress.     Appearance: She is not ill-appearing.   Eyes:      Pupils: Pupils are equal, round, and reactive to light.   Cardiovascular:      Rate and Rhythm: Normal rate and regular rhythm.      Pulses: Normal pulses.      Heart sounds: No murmur heard.  Pulmonary:      Effort: No respiratory distress.      Breath sounds: No wheezing.   Abdominal:      General: Abdomen is flat. Bowel sounds are normal. There is no distension.   Musculoskeletal:      Right lower leg: No edema.      Left lower leg: No edema.   Skin:     General: Skin is warm and dry.   Neurological:      Mental Status: She is alert. Mental status is at baseline.      Cranial Nerves: No cranial nerve deficit.      Motor: No weakness.   Psychiatric:         Mood and Affect: Mood normal.         Behavior: Behavior normal.         Assessment/Plan   Diagnoses and all orders for this visit:  Primary hypertension  Atrial fibrillation, unspecified type (CMS/[facility])  Closed fracture of left hip, sequela  Gastroesophageal reflux disease without  esophagitis  Supratherapeutic INR    Pt overall medically stable. Labs unremakrable, no changes to medications. If patient is needing discharge from facility she is medically clear. Dispo planning underway.    Talon Cullen DO

## 2023-10-18 ENCOUNTER — NURSING HOME VISIT (OUTPATIENT)
Dept: POST ACUTE CARE | Facility: EXTERNAL LOCATION | Age: 88
End: 2023-10-18
Payer: MEDICARE

## 2023-10-18 DIAGNOSIS — F43.21 GRIEF REACTION: ICD-10-CM

## 2023-10-18 DIAGNOSIS — F41.9 ANXIETY: ICD-10-CM

## 2023-10-18 DIAGNOSIS — S72.002S CLOSED FRACTURE OF LEFT HIP, SEQUELA: ICD-10-CM

## 2023-10-18 DIAGNOSIS — K21.9 GASTROESOPHAGEAL REFLUX DISEASE WITHOUT ESOPHAGITIS: ICD-10-CM

## 2023-10-18 DIAGNOSIS — I48.91 ATRIAL FIBRILLATION, UNSPECIFIED TYPE (MULTI): ICD-10-CM

## 2023-10-18 DIAGNOSIS — R79.1 SUPRATHERAPEUTIC INR: ICD-10-CM

## 2023-10-18 DIAGNOSIS — I10 PRIMARY HYPERTENSION: Primary | ICD-10-CM

## 2023-10-18 PROCEDURE — 99308 SBSQ NF CARE LOW MDM 20: CPT | Performed by: STUDENT IN AN ORGANIZED HEALTH CARE EDUCATION/TRAINING PROGRAM

## 2023-10-18 NOTE — LETTER
Patient: Noemí Hinton  : 1928    Encounter Date: 10/18/2023    Subjective  Patient ID: Noemí Hinton is a 89 y.o. female.    Patient seen and examined.  She reports that she is overall doing well without any concerns.  States that she is somewhat distraught as of late.  Her mood is overall decreased as her  is currently in the hospital.  The  was planning to be admitted to the facility for further therapy however the wife there is overall decreased secondary to this.  Otherwise, she is overall feeling well without any concerns.        Review of Systems   Constitutional: Negative.    HENT: Negative.     Respiratory: Negative.     Cardiovascular: Negative.    Gastrointestinal: Negative.    Musculoskeletal: Negative.    Neurological: Negative.    Psychiatric/Behavioral:  Positive for agitation. The patient is nervous/anxious.        ObjectiveVitals Reviewed via facility EMR   Physical Exam  Constitutional:       General: She is not in acute distress.     Appearance: She is not ill-appearing.   Eyes:      Pupils: Pupils are equal, round, and reactive to light.   Cardiovascular:      Rate and Rhythm: Normal rate and regular rhythm.      Pulses: Normal pulses.      Heart sounds: No murmur heard.  Pulmonary:      Effort: No respiratory distress.      Breath sounds: No wheezing.   Abdominal:      General: Abdomen is flat. Bowel sounds are normal. There is no distension.   Musculoskeletal:      Right lower leg: No edema.      Left lower leg: No edema.   Skin:     General: Skin is warm and dry.   Neurological:      Mental Status: She is alert. Mental status is at baseline.      Cranial Nerves: No cranial nerve deficit.      Motor: No weakness.   Psychiatric:         Mood and Affect: Mood normal.         Behavior: Behavior normal.         Assessment/Plan  Diagnoses and all orders for this visit:  Primary hypertension  Atrial fibrillation, unspecified type (CMS/[facility])  Closed fracture of left hip,  sequela  Gastroesophageal reflux disease without esophagitis  Supratherapeutic INR    Patient overall medically stable, is having some issues with her  being in the hospital.  We will closely monitor mood secondary to this.  Vitals above and overall stable.  Continue supportive care routine labs.  Consider psych evaluation if mood overall worsens.    Talon Cullen DO       Electronically Signed By: Talon Cullen DO   10/19/23  8:48 PM

## 2023-10-19 PROBLEM — F43.20 GRIEF REACTION: Status: ACTIVE | Noted: 2023-10-19

## 2023-10-19 PROBLEM — F43.21 GRIEF REACTION: Status: ACTIVE | Noted: 2023-10-19

## 2023-10-19 ASSESSMENT — ENCOUNTER SYMPTOMS
NEUROLOGICAL NEGATIVE: 1
AGITATION: 1
MUSCULOSKELETAL NEGATIVE: 1
CONSTITUTIONAL NEGATIVE: 1
CARDIOVASCULAR NEGATIVE: 1
GASTROINTESTINAL NEGATIVE: 1
RESPIRATORY NEGATIVE: 1
NERVOUS/ANXIOUS: 1

## 2023-10-20 NOTE — PROGRESS NOTES
Subjective   Patient ID: Noemí Hinton is a 89 y.o. female.    Patient seen and examined.  She reports that she is overall doing well without any concerns.  States that she is somewhat distraught as of late.  Her mood is overall decreased as her  is currently in the hospital.  The  was planning to be admitted to the facility for further therapy however the wife there is overall decreased secondary to this.  Otherwise, she is overall feeling well without any concerns.        Review of Systems   Constitutional: Negative.    HENT: Negative.     Respiratory: Negative.     Cardiovascular: Negative.    Gastrointestinal: Negative.    Musculoskeletal: Negative.    Neurological: Negative.    Psychiatric/Behavioral:  Positive for agitation. The patient is nervous/anxious.        Objective Vitals Reviewed via facility EMR   Physical Exam  Constitutional:       General: She is not in acute distress.     Appearance: She is not ill-appearing.   Eyes:      Pupils: Pupils are equal, round, and reactive to light.   Cardiovascular:      Rate and Rhythm: Normal rate and regular rhythm.      Pulses: Normal pulses.      Heart sounds: No murmur heard.  Pulmonary:      Effort: No respiratory distress.      Breath sounds: No wheezing.   Abdominal:      General: Abdomen is flat. Bowel sounds are normal. There is no distension.   Musculoskeletal:      Right lower leg: No edema.      Left lower leg: No edema.   Skin:     General: Skin is warm and dry.   Neurological:      Mental Status: She is alert. Mental status is at baseline.      Cranial Nerves: No cranial nerve deficit.      Motor: No weakness.   Psychiatric:         Mood and Affect: Mood normal.         Behavior: Behavior normal.         Assessment/Plan   Diagnoses and all orders for this visit:  Primary hypertension  Atrial fibrillation, unspecified type (CMS/[facility])  Closed fracture of left hip, sequela  Gastroesophageal reflux disease without esophagitis  Supratherapeutic  INR    Patient overall medically stable, is having some issues with her  being in the hospital.  We will closely monitor mood secondary to this.  Vitals above and overall stable.  Continue supportive care routine labs.  Consider psych evaluation if mood overall worsens.    Talon Cullen DO

## 2023-10-25 ENCOUNTER — NURSING HOME VISIT (OUTPATIENT)
Dept: POST ACUTE CARE | Facility: EXTERNAL LOCATION | Age: 88
End: 2023-10-25
Payer: MEDICARE

## 2023-10-25 DIAGNOSIS — I48.91 ATRIAL FIBRILLATION, UNSPECIFIED TYPE (MULTI): ICD-10-CM

## 2023-10-25 DIAGNOSIS — G62.9 NEUROPATHY: ICD-10-CM

## 2023-10-25 DIAGNOSIS — I10 PRIMARY HYPERTENSION: Primary | ICD-10-CM

## 2023-10-25 DIAGNOSIS — F41.9 ANXIETY: ICD-10-CM

## 2023-10-25 PROCEDURE — 99348 HOME/RES VST EST LOW MDM 30: CPT | Performed by: STUDENT IN AN ORGANIZED HEALTH CARE EDUCATION/TRAINING PROGRAM

## 2023-10-25 NOTE — LETTER
Patient: Noemí Hinton  : 1928    Encounter Date: 10/25/2023    Subjective  Patient ID: Noemí Hinton is a 89 y.o. female.    Patient is a 89-year-old female with past medical history of hyperlipidemia, anxiety, hypertension, peripheral neuropathy and A-fib who recently moved from the skilled nursing facility s/p hospitalization.  Patient recovered well with therapy and was discharged back to [facility] in stable condition.  On evaluation, she states that she is overall doing well, her neuropathy symptoms have somewhat worsened since coming back to the facility.  States that she is somewhat anxious due to her  recently being admitted to nursing home as well.  Otherwise, she states no acute issues or concerns.        Review of Systems   Constitutional: Negative.    HENT: Negative.     Respiratory: Negative.     Cardiovascular: Negative.    Gastrointestinal: Negative.    Musculoskeletal: Negative.    Neurological: Negative.    Psychiatric/Behavioral: Negative.         ObjectiveVitals Reviewed via facility EMR   Physical Exam  Constitutional:       General: She is not in acute distress.     Appearance: She is not ill-appearing.   Eyes:      Pupils: Pupils are equal, round, and reactive to light.   Cardiovascular:      Rate and Rhythm: Normal rate and regular rhythm.      Pulses: Normal pulses.      Heart sounds: No murmur heard.  Pulmonary:      Effort: No respiratory distress.      Breath sounds: No wheezing.   Abdominal:      General: Abdomen is flat. Bowel sounds are normal. There is no distension.   Musculoskeletal:      Right lower leg: No edema.      Left lower leg: No edema.   Skin:     General: Skin is warm and dry.   Neurological:      Mental Status: She is alert. Mental status is at baseline.      Cranial Nerves: No cranial nerve deficit.      Motor: No weakness.   Psychiatric:         Mood and Affect: Mood normal.         Behavior: Behavior normal.         Assessment/Plan  Diagnoses and all orders  for this visit:  Primary hypertension  Atrial fibrillation, unspecified type (CMS/HCC)  Anxiety  Neuropathy    Patient seen and examined, overall medically stable.  Continue supportive care.  We will increase gabapentin to 300 mg nightly to help with neuropathy symptoms.  Closely monitor anxiety.  Continue supportive care.    Talon Cullen DO       Electronically Signed By: Talon Cullen DO   10/28/23 10:38 PM

## 2023-10-28 ASSESSMENT — ENCOUNTER SYMPTOMS
MUSCULOSKELETAL NEGATIVE: 1
CARDIOVASCULAR NEGATIVE: 1
NEUROLOGICAL NEGATIVE: 1
PSYCHIATRIC NEGATIVE: 1
GASTROINTESTINAL NEGATIVE: 1
CONSTITUTIONAL NEGATIVE: 1
RESPIRATORY NEGATIVE: 1

## 2023-10-29 NOTE — PROGRESS NOTES
Subjective   Patient ID: Noemí Hinton is a 89 y.o. female.    Patient is a 89-year-old female with past medical history of hyperlipidemia, anxiety, hypertension, peripheral neuropathy and A-fib who recently moved from the skilled nursing facility s/p hospitalization.  Patient recovered well with therapy and was discharged back to [facility] in stable condition.  On evaluation, she states that she is overall doing well, her neuropathy symptoms have somewhat worsened since coming back to the facility.  States that she is somewhat anxious due to her  recently being admitted to nursing home as well.  Otherwise, she states no acute issues or concerns.        Review of Systems   Constitutional: Negative.    HENT: Negative.     Respiratory: Negative.     Cardiovascular: Negative.    Gastrointestinal: Negative.    Musculoskeletal: Negative.    Neurological: Negative.    Psychiatric/Behavioral: Negative.         Objective Vitals Reviewed via facility EMR   Physical Exam  Constitutional:       General: She is not in acute distress.     Appearance: She is not ill-appearing.   Eyes:      Pupils: Pupils are equal, round, and reactive to light.   Cardiovascular:      Rate and Rhythm: Normal rate and regular rhythm.      Pulses: Normal pulses.      Heart sounds: No murmur heard.  Pulmonary:      Effort: No respiratory distress.      Breath sounds: No wheezing.   Abdominal:      General: Abdomen is flat. Bowel sounds are normal. There is no distension.   Musculoskeletal:      Right lower leg: No edema.      Left lower leg: No edema.   Skin:     General: Skin is warm and dry.   Neurological:      Mental Status: She is alert. Mental status is at baseline.      Cranial Nerves: No cranial nerve deficit.      Motor: No weakness.   Psychiatric:         Mood and Affect: Mood normal.         Behavior: Behavior normal.         Assessment/Plan   Diagnoses and all orders for this visit:  Primary hypertension  Atrial fibrillation, unspecified  type (CMS/[facility])  Anxiety  Neuropathy    Patient seen and examined, overall medically stable.  Continue supportive care.  We will increase gabapentin to 300 mg nightly to help with neuropathy symptoms.  Closely monitor anxiety.  Continue supportive care.    Talon Cullen DO

## 2023-11-08 ENCOUNTER — NURSING HOME VISIT (OUTPATIENT)
Dept: POST ACUTE CARE | Facility: EXTERNAL LOCATION | Age: 88
End: 2023-11-08
Payer: MEDICARE

## 2023-11-08 DIAGNOSIS — F41.9 ANXIETY: ICD-10-CM

## 2023-11-08 DIAGNOSIS — I48.91 ATRIAL FIBRILLATION, UNSPECIFIED TYPE (MULTI): Primary | ICD-10-CM

## 2023-11-08 DIAGNOSIS — F43.21 GRIEF REACTION: ICD-10-CM

## 2023-11-08 DIAGNOSIS — T14.8XXA SKIN ABRASION: ICD-10-CM

## 2023-11-08 PROCEDURE — 99348 HOME/RES VST EST LOW MDM 30: CPT | Performed by: STUDENT IN AN ORGANIZED HEALTH CARE EDUCATION/TRAINING PROGRAM

## 2023-11-08 NOTE — LETTER
Patient: Noemí Hinton  : 1928    Encounter Date: 2023    Subjective  Patient ID: Noemí Hinton is a 89 y.o. female.    Patient seen and evaluated.  She recently had a new onset wound happen on her left lower extremity. She is unsure how this occurred. Otherwise states that she is overall feeling well without any concerns.        Review of Systems   Constitutional: Negative.    HENT: Negative.     Respiratory: Negative.     Cardiovascular: Negative.    Gastrointestinal: Negative.    Musculoskeletal: Negative.    Skin:  Positive for rash and wound.   Neurological: Negative.    Psychiatric/Behavioral: Negative.         ObjectiveVitals Reviewed via facility EMR   Physical Exam  Constitutional:       General: She is not in acute distress.     Appearance: She is not ill-appearing.   Eyes:      Pupils: Pupils are equal, round, and reactive to light.   Cardiovascular:      Rate and Rhythm: Normal rate and regular rhythm.      Pulses: Normal pulses.      Heart sounds: No murmur heard.  Pulmonary:      Effort: No respiratory distress.      Breath sounds: No wheezing.   Abdominal:      General: Abdomen is flat. Bowel sounds are normal. There is no distension.   Musculoskeletal:      Right lower leg: No edema.      Left lower leg: No edema.   Skin:     General: Skin is warm and dry.      Findings: Lesion and rash present.      Comments: Abraision, granulation tissue present   Neurological:      Mental Status: She is alert. Mental status is at baseline.      Cranial Nerves: No cranial nerve deficit.      Motor: No weakness.   Psychiatric:         Mood and Affect: Mood normal.         Behavior: Behavior normal.         Assessment/Plan  Diagnoses and all orders for this visit:  Atrial fibrillation, unspecified type (CMS/HCC)  Grief reaction  Anxiety  Skin abrasion      Pt seen and examined, noted skin abrasion over the skin. Very superficial, advise xeroform, gauze. No signs of infection, continue supportive  care.    Talon Cullen DO       Electronically Signed By: Talon Cullen DO   11/9/23  8:55 PM

## 2023-11-09 ASSESSMENT — ENCOUNTER SYMPTOMS
PSYCHIATRIC NEGATIVE: 1
GASTROINTESTINAL NEGATIVE: 1
CARDIOVASCULAR NEGATIVE: 1
NEUROLOGICAL NEGATIVE: 1
RESPIRATORY NEGATIVE: 1
CONSTITUTIONAL NEGATIVE: 1
MUSCULOSKELETAL NEGATIVE: 1
WOUND: 1

## 2023-11-10 NOTE — PROGRESS NOTES
Subjective   Patient ID: Noemí Hinton is a 89 y.o. female.    Patient seen and evaluated.  She recently had a new onset wound happen on her left lower extremity. She is unsure how this occurred. Otherwise states that she is overall feeling well without any concerns.        Review of Systems   Constitutional: Negative.    HENT: Negative.     Respiratory: Negative.     Cardiovascular: Negative.    Gastrointestinal: Negative.    Musculoskeletal: Negative.    Skin:  Positive for rash and wound.   Neurological: Negative.    Psychiatric/Behavioral: Negative.         Objective Vitals Reviewed via facility EMR   Physical Exam  Constitutional:       General: She is not in acute distress.     Appearance: She is not ill-appearing.   Eyes:      Pupils: Pupils are equal, round, and reactive to light.   Cardiovascular:      Rate and Rhythm: Normal rate and regular rhythm.      Pulses: Normal pulses.      Heart sounds: No murmur heard.  Pulmonary:      Effort: No respiratory distress.      Breath sounds: No wheezing.   Abdominal:      General: Abdomen is flat. Bowel sounds are normal. There is no distension.   Musculoskeletal:      Right lower leg: No edema.      Left lower leg: No edema.   Skin:     General: Skin is warm and dry.      Findings: Lesion and rash present.      Comments: Abraision, granulation tissue present   Neurological:      Mental Status: She is alert. Mental status is at baseline.      Cranial Nerves: No cranial nerve deficit.      Motor: No weakness.   Psychiatric:         Mood and Affect: Mood normal.         Behavior: Behavior normal.         Assessment/Plan   Diagnoses and all orders for this visit:  Atrial fibrillation, unspecified type (CMS/HCC)  Grief reaction  Anxiety  Skin abrasion      Pt seen and examined, noted skin abrasion over the skin. Very superficial, advise xeroform, gauze. No signs of infection, continue supportive care.    Talon Cullen DO

## 2023-11-28 ENCOUNTER — NURSING HOME VISIT (OUTPATIENT)
Dept: POST ACUTE CARE | Facility: EXTERNAL LOCATION | Age: 88
End: 2023-11-28
Payer: MEDICARE

## 2023-11-28 DIAGNOSIS — R26.2 DIFFICULTY IN WALKING: ICD-10-CM

## 2023-11-28 DIAGNOSIS — L89.301 PRESSURE INJURY OF BUTTOCK, STAGE 1, UNSPECIFIED LATERALITY: ICD-10-CM

## 2023-11-28 DIAGNOSIS — I48.91 ATRIAL FIBRILLATION, UNSPECIFIED TYPE (MULTI): ICD-10-CM

## 2023-11-28 DIAGNOSIS — I10 PRIMARY HYPERTENSION: Primary | ICD-10-CM

## 2023-11-28 DIAGNOSIS — R79.1 SUPRATHERAPEUTIC INR: ICD-10-CM

## 2023-11-28 DIAGNOSIS — K92.1 BLOOD IN STOOL: ICD-10-CM

## 2023-11-28 PROCEDURE — 99348 HOME/RES VST EST LOW MDM 30: CPT | Performed by: STUDENT IN AN ORGANIZED HEALTH CARE EDUCATION/TRAINING PROGRAM

## 2023-11-28 NOTE — LETTER
Patient: Noemí Hinton  : 1928    Encounter Date: 2023    Subjective  Patient ID: Noemí Hinton is a 89 y.o. female.    Patient seen and evaluated due to facility request.  Recently, patient has been having a couple bowel movements, that have been noted to be bloody in nature.  She regards that her brief was filled with bright red blood.  This happened a couple times however has since resolved.  Regards that she is on a blood thinner with Coumadin as well.  Otherwise, states that she is been fairly unsteady as well.  Has not been getting therapy but feels like she would benefit from this.  States no additional issues or concerns.        Review of Systems   Constitutional: Negative.    HENT: Negative.     Respiratory: Negative.     Cardiovascular: Negative.    Gastrointestinal:  Positive for blood in stool.   Musculoskeletal: Negative.    Neurological: Negative.    Psychiatric/Behavioral: Negative.         ObjectiveVitals Reviewed via facility EMR   Physical Exam  Constitutional:       General: She is not in acute distress.     Appearance: She is not ill-appearing.   Eyes:      Pupils: Pupils are equal, round, and reactive to light.   Cardiovascular:      Rate and Rhythm: Normal rate and regular rhythm.      Pulses: Normal pulses.      Heart sounds: No murmur heard.  Pulmonary:      Effort: No respiratory distress.      Breath sounds: No wheezing.   Abdominal:      General: Abdomen is flat. Bowel sounds are normal. There is no distension.      Comments: No signs of bleeding   Musculoskeletal:      Right lower leg: No edema.      Left lower leg: No edema.   Skin:     General: Skin is warm and dry.   Neurological:      Mental Status: She is alert. Mental status is at baseline.      Cranial Nerves: No cranial nerve deficit.      Motor: No weakness.   Psychiatric:         Mood and Affect: Mood normal.         Behavior: Behavior normal.         Assessment/Plan  Diagnoses and all orders for this  visit:  Primary hypertension  Atrial fibrillation, unspecified type (CMS/HCC)  Supratherapeutic INR  Blood in stool  Difficulty in walking      Patient seen and examined, recently had some episodes of bloody bowel movements, suspect underlying hemorrhoids as this resolved spontaneously, recommend bowel regimen, however will obtain CBC as well as PT/INR.  Closely monitor for signs and symptoms of bleeding.  She has been fairly unsteady as of late, recommend PT OT evaluation.  Continue supportive care.  Facility updated with care plan.    Talon Cullen DO       Electronically Signed By: Talon Cullen DO   11/30/23 10:40 AM

## 2023-11-30 PROBLEM — R26.2 DIFFICULTY IN WALKING: Status: ACTIVE | Noted: 2023-11-30

## 2023-11-30 PROBLEM — K92.1 BLOOD IN STOOL: Status: ACTIVE | Noted: 2023-11-30

## 2023-11-30 ASSESSMENT — ENCOUNTER SYMPTOMS
CONSTITUTIONAL NEGATIVE: 1
PSYCHIATRIC NEGATIVE: 1
CARDIOVASCULAR NEGATIVE: 1
RESPIRATORY NEGATIVE: 1
NEUROLOGICAL NEGATIVE: 1
BLOOD IN STOOL: 1
MUSCULOSKELETAL NEGATIVE: 1

## 2023-11-30 NOTE — PROGRESS NOTES
Subjective   Patient ID: Noemí Hinton is a 89 y.o. female.    Patient seen and evaluated due to facility request.  Recently, patient has been having a couple bowel movements, that have been noted to be bloody in nature.  She regards that her brief was filled with bright red blood.  This happened a couple times however has since resolved.  Regards that she is on a blood thinner with Coumadin as well.  Otherwise, states that she is been fairly unsteady as well.  Has not been getting therapy but feels like she would benefit from this.  States no additional issues or concerns.        Review of Systems   Constitutional: Negative.    HENT: Negative.     Respiratory: Negative.     Cardiovascular: Negative.    Gastrointestinal:  Positive for blood in stool.   Musculoskeletal: Negative.    Neurological: Negative.    Psychiatric/Behavioral: Negative.         Objective Vitals Reviewed via facility EMR   Physical Exam  Constitutional:       General: She is not in acute distress.     Appearance: She is not ill-appearing.   Eyes:      Pupils: Pupils are equal, round, and reactive to light.   Cardiovascular:      Rate and Rhythm: Normal rate and regular rhythm.      Pulses: Normal pulses.      Heart sounds: No murmur heard.  Pulmonary:      Effort: No respiratory distress.      Breath sounds: No wheezing.   Abdominal:      General: Abdomen is flat. Bowel sounds are normal. There is no distension.      Comments: No signs of bleeding   Musculoskeletal:      Right lower leg: No edema.      Left lower leg: No edema.   Skin:     General: Skin is warm and dry.   Neurological:      Mental Status: She is alert. Mental status is at baseline.      Cranial Nerves: No cranial nerve deficit.      Motor: No weakness.   Psychiatric:         Mood and Affect: Mood normal.         Behavior: Behavior normal.         Assessment/Plan   Diagnoses and all orders for this visit:  Primary hypertension  Atrial fibrillation, unspecified type  (CMS/HCC)  Supratherapeutic INR  Blood in stool  Difficulty in walking  Pressure injury of buttock, stage 1, unspecified laterality      Patient seen and examined, recently had some episodes of bloody bowel movements, suspect underlying hemorrhoids as this resolved spontaneously, recommend bowel regimen, however will obtain CBC as well as PT/INR.  Closely monitor for signs and symptoms of bleeding.  She has been fairly unsteady as of late, recommend PT OT evaluation.  Continue supportive care.  Facility updated with care plan.    Talon Cullen DO     Addendum 12/1/23    Patient would benefit from skilled nurse to evaluate and treat secondary to new onset pressure wound bilateral buttocks and referral to PT due to unsteadiness on feet with weakness due to  history of left femur fracture and atrial fibrillation as well as diagnosis as above to optimize functionality and independence with ADLs

## 2023-12-01 PROBLEM — L89.309 PRESSURE INJURY OF SKIN OF BUTTOCK: Status: ACTIVE | Noted: 2023-12-01

## 2023-12-08 ENCOUNTER — ANCILLARY PROCEDURE (OUTPATIENT)
Dept: CARDIOLOGY | Facility: CLINIC | Age: 88
End: 2023-12-08
Payer: MEDICARE

## 2023-12-08 ENCOUNTER — HOSPITAL ENCOUNTER (INPATIENT)
Facility: HOSPITAL | Age: 88
LOS: 10 days | Discharge: NURSING, REHAB OR LONG-TERM CARE | DRG: 813 | End: 2023-12-18
Attending: EMERGENCY MEDICINE | Admitting: INTERNAL MEDICINE
Payer: MEDICARE

## 2023-12-08 ENCOUNTER — APPOINTMENT (OUTPATIENT)
Dept: RADIOLOGY | Facility: HOSPITAL | Age: 88
DRG: 813 | End: 2023-12-08
Payer: MEDICARE

## 2023-12-08 DIAGNOSIS — R31.0 GROSS HEMATURIA: ICD-10-CM

## 2023-12-08 DIAGNOSIS — F41.9 ANXIETY: ICD-10-CM

## 2023-12-08 DIAGNOSIS — E87.5 HYPERKALEMIA: ICD-10-CM

## 2023-12-08 DIAGNOSIS — N32.0 HYDRONEPHROSIS DUE TO OBSTRUCTION OF BLADDER: ICD-10-CM

## 2023-12-08 DIAGNOSIS — N93.9 VAGINAL BLEEDING: Primary | ICD-10-CM

## 2023-12-08 DIAGNOSIS — N13.30 HYDRONEPHROSIS DUE TO OBSTRUCTION OF BLADDER: ICD-10-CM

## 2023-12-08 DIAGNOSIS — N17.9 AKI (ACUTE KIDNEY INJURY) (CMS-HCC): ICD-10-CM

## 2023-12-08 DIAGNOSIS — R79.1 SUPRATHERAPEUTIC INR: ICD-10-CM

## 2023-12-08 DIAGNOSIS — E87.20 ACIDOSIS: ICD-10-CM

## 2023-12-08 DIAGNOSIS — L30.9 DERMATITIS: ICD-10-CM

## 2023-12-08 DIAGNOSIS — T45.515A: ICD-10-CM

## 2023-12-08 DIAGNOSIS — G62.9 NEUROPATHY: ICD-10-CM

## 2023-12-08 PROBLEM — S37.22XA HEMATOMA OF BLADDER WALL: Status: ACTIVE | Noted: 2023-12-08

## 2023-12-08 PROBLEM — F43.20 GRIEF REACTION: Status: RESOLVED | Noted: 2023-10-19 | Resolved: 2023-12-08

## 2023-12-08 PROBLEM — K92.1 BLOOD IN STOOL: Status: RESOLVED | Noted: 2023-11-30 | Resolved: 2023-12-08

## 2023-12-08 PROBLEM — T14.8XXA SKIN ABRASION: Status: RESOLVED | Noted: 2023-09-19 | Resolved: 2023-12-08

## 2023-12-08 PROBLEM — F43.21 GRIEF REACTION: Status: RESOLVED | Noted: 2023-10-19 | Resolved: 2023-12-08

## 2023-12-08 PROBLEM — N95.0 POSTMENOPAUSAL BLEEDING: Status: ACTIVE | Noted: 2023-12-08

## 2023-12-08 PROBLEM — R30.0 DYSURIA: Status: ACTIVE | Noted: 2023-12-08

## 2023-12-08 PROBLEM — R31.9 HEMATURIA: Status: ACTIVE | Noted: 2023-12-08

## 2023-12-08 PROBLEM — N95.0 POSTMENOPAUSAL BLEEDING: Status: RESOLVED | Noted: 2023-12-08 | Resolved: 2023-12-08

## 2023-12-08 LAB
ABO GROUP (TYPE) IN BLOOD: NORMAL
ABO GROUP (TYPE) IN BLOOD: NORMAL
ALBUMIN SERPL BCP-MCNC: 3.2 G/DL (ref 3.4–5)
ALP SERPL-CCNC: 86 U/L (ref 33–136)
ALT SERPL W P-5'-P-CCNC: 7 U/L (ref 7–45)
ANION GAP SERPL CALC-SCNC: 10 MMOL/L (ref 10–20)
ANTIBODY SCREEN: NORMAL
APPEARANCE UR: ABNORMAL
AST SERPL W P-5'-P-CCNC: 14 U/L (ref 9–39)
BASOPHILS # BLD AUTO: 0.07 X10*3/UL (ref 0–0.1)
BASOPHILS NFR BLD AUTO: 0.7 %
BILIRUB SERPL-MCNC: 0.6 MG/DL (ref 0–1.2)
BILIRUB UR STRIP.AUTO-MCNC: NEGATIVE MG/DL
BLOOD EXPIRATION DATE: NORMAL
BUN SERPL-MCNC: 29 MG/DL (ref 6–23)
CALCIUM SERPL-MCNC: 8.7 MG/DL (ref 8.6–10.3)
CARDIAC TROPONIN I PNL SERPL HS: 5 NG/L (ref 0–13)
CHLORIDE SERPL-SCNC: 106 MMOL/L (ref 98–107)
CO2 SERPL-SCNC: 26 MMOL/L (ref 21–32)
COLOR UR: ABNORMAL
CREAT SERPL-MCNC: 1.01 MG/DL (ref 0.5–1.05)
DISPENSE STATUS: NORMAL
EOSINOPHIL # BLD AUTO: 0.13 X10*3/UL (ref 0–0.4)
EOSINOPHIL NFR BLD AUTO: 1.3 %
ERYTHROCYTE [DISTWIDTH] IN BLOOD BY AUTOMATED COUNT: 15.1 % (ref 11.5–14.5)
GFR SERPL CREATININE-BSD FRML MDRD: 51 ML/MIN/1.73M*2
GLUCOSE SERPL-MCNC: 94 MG/DL (ref 74–99)
GLUCOSE UR STRIP.AUTO-MCNC: NEGATIVE MG/DL
HCT VFR BLD AUTO: 38.1 % (ref 36–46)
HGB BLD-MCNC: 11.8 G/DL (ref 12–16)
HOLD SPECIMEN: NORMAL
IMM GRANULOCYTES # BLD AUTO: 0.07 X10*3/UL (ref 0–0.5)
IMM GRANULOCYTES NFR BLD AUTO: 0.7 % (ref 0–0.9)
INR PPP: 5.1 (ref 0.9–1.1)
KETONES UR STRIP.AUTO-MCNC: NEGATIVE MG/DL
LEUKOCYTE ESTERASE UR QL STRIP.AUTO: ABNORMAL
LYMPHOCYTES # BLD AUTO: 1.05 X10*3/UL (ref 0.8–3)
LYMPHOCYTES NFR BLD AUTO: 10.3 %
MCH RBC QN AUTO: 29 PG (ref 26–34)
MCHC RBC AUTO-ENTMCNC: 31 G/DL (ref 32–36)
MCV RBC AUTO: 94 FL (ref 80–100)
MONOCYTES # BLD AUTO: 0.86 X10*3/UL (ref 0.05–0.8)
MONOCYTES NFR BLD AUTO: 8.4 %
NEUTROPHILS # BLD AUTO: 8.06 X10*3/UL (ref 1.6–5.5)
NEUTROPHILS NFR BLD AUTO: 78.6 %
NITRITE UR QL STRIP.AUTO: NEGATIVE
NRBC BLD-RTO: 0 /100 WBCS (ref 0–0)
PH UR STRIP.AUTO: 6.5 [PH]
PLATELET # BLD AUTO: 250 X10*3/UL (ref 150–450)
POTASSIUM SERPL-SCNC: 4.2 MMOL/L (ref 3.5–5.3)
PRODUCT BLOOD TYPE: 6200
PRODUCT CODE: NORMAL
PROT SERPL-MCNC: 6 G/DL (ref 6.4–8.2)
PROT UR STRIP.AUTO-MCNC: ABNORMAL MG/DL
PROTHROMBIN TIME: 58.7 SECONDS (ref 9.8–12.8)
RBC # BLD AUTO: 4.07 X10*6/UL (ref 4–5.2)
RBC # UR STRIP.AUTO: ABNORMAL /UL
RBC #/AREA URNS AUTO: >20 /HPF
RH FACTOR (ANTIGEN D): NORMAL
RH FACTOR (ANTIGEN D): NORMAL
SODIUM SERPL-SCNC: 138 MMOL/L (ref 136–145)
SP GR UR STRIP.AUTO: 1.02
UNIT ABO: NORMAL
UNIT NUMBER: NORMAL
UNIT RH: NORMAL
UNIT VOLUME: 337
UNIT VOLUME: 350
UNIT VOLUME: 350
UROBILINOGEN UR STRIP.AUTO-MCNC: ABNORMAL MG/DL
WBC # BLD AUTO: 10.2 X10*3/UL (ref 4.4–11.3)
WBC #/AREA URNS AUTO: >50 /HPF
XM INTEP: NORMAL
XM INTEP: NORMAL

## 2023-12-08 PROCEDURE — 74177 CT ABD & PELVIS W/CONTRAST: CPT

## 2023-12-08 PROCEDURE — 93010 ELECTROCARDIOGRAM REPORT: CPT | Performed by: EMERGENCY MEDICINE

## 2023-12-08 PROCEDURE — 86920 COMPATIBILITY TEST SPIN: CPT

## 2023-12-08 PROCEDURE — 2500000004 HC RX 250 GENERAL PHARMACY W/ HCPCS (ALT 636 FOR OP/ED): Performed by: EMERGENCY MEDICINE

## 2023-12-08 PROCEDURE — 96374 THER/PROPH/DIAG INJ IV PUSH: CPT | Mod: 59

## 2023-12-08 PROCEDURE — 2550000001 HC RX 255 CONTRASTS: Performed by: EMERGENCY MEDICINE

## 2023-12-08 PROCEDURE — 96365 THER/PROPH/DIAG IV INF INIT: CPT

## 2023-12-08 PROCEDURE — 85610 PROTHROMBIN TIME: CPT

## 2023-12-08 PROCEDURE — 2500000004 HC RX 250 GENERAL PHARMACY W/ HCPCS (ALT 636 FOR OP/ED)

## 2023-12-08 PROCEDURE — 99285 EMERGENCY DEPT VISIT HI MDM: CPT | Performed by: EMERGENCY MEDICINE

## 2023-12-08 PROCEDURE — 87086 URINE CULTURE/COLONY COUNT: CPT | Mod: STJLAB

## 2023-12-08 PROCEDURE — 36430 TRANSFUSION BLD/BLD COMPNT: CPT

## 2023-12-08 PROCEDURE — P9016 RBC LEUKOCYTES REDUCED: HCPCS

## 2023-12-08 PROCEDURE — P9017 PLASMA 1 DONOR FRZ W/IN 8 HR: HCPCS

## 2023-12-08 PROCEDURE — 1100000001 HC PRIVATE ROOM DAILY

## 2023-12-08 PROCEDURE — 85025 COMPLETE CBC W/AUTO DIFF WBC: CPT

## 2023-12-08 PROCEDURE — 74177 CT ABD & PELVIS W/CONTRAST: CPT | Performed by: STUDENT IN AN ORGANIZED HEALTH CARE EDUCATION/TRAINING PROGRAM

## 2023-12-08 PROCEDURE — 96376 TX/PRO/DX INJ SAME DRUG ADON: CPT

## 2023-12-08 PROCEDURE — 81001 URINALYSIS AUTO W/SCOPE: CPT

## 2023-12-08 PROCEDURE — 99285 EMERGENCY DEPT VISIT HI MDM: CPT | Mod: 25,27 | Performed by: EMERGENCY MEDICINE

## 2023-12-08 PROCEDURE — 96375 TX/PRO/DX INJ NEW DRUG ADDON: CPT

## 2023-12-08 PROCEDURE — 93005 ELECTROCARDIOGRAM TRACING: CPT

## 2023-12-08 PROCEDURE — 86850 RBC ANTIBODY SCREEN: CPT | Performed by: EMERGENCY MEDICINE

## 2023-12-08 PROCEDURE — 36415 COLL VENOUS BLD VENIPUNCTURE: CPT

## 2023-12-08 PROCEDURE — 84484 ASSAY OF TROPONIN QUANT: CPT

## 2023-12-08 PROCEDURE — 80053 COMPREHEN METABOLIC PANEL: CPT

## 2023-12-08 PROCEDURE — 99222 1ST HOSP IP/OBS MODERATE 55: CPT | Performed by: NURSE PRACTITIONER

## 2023-12-08 RX ORDER — MORPHINE SULFATE 4 MG/ML
4 INJECTION, SOLUTION INTRAMUSCULAR; INTRAVENOUS ONCE
Status: COMPLETED | OUTPATIENT
Start: 2023-12-08 | End: 2023-12-08

## 2023-12-08 RX ORDER — GABAPENTIN 300 MG/1
600 CAPSULE ORAL NIGHTLY
Status: DISCONTINUED | OUTPATIENT
Start: 2023-12-08 | End: 2023-12-13

## 2023-12-08 RX ORDER — ONDANSETRON 4 MG/1
4 TABLET, ORALLY DISINTEGRATING ORAL EVERY 8 HOURS PRN
Status: DISCONTINUED | OUTPATIENT
Start: 2023-12-08 | End: 2023-12-18 | Stop reason: HOSPADM

## 2023-12-08 RX ORDER — FENTANYL CITRATE 50 UG/ML
25 INJECTION, SOLUTION INTRAMUSCULAR; INTRAVENOUS ONCE
Status: COMPLETED | OUTPATIENT
Start: 2023-12-08 | End: 2023-12-08

## 2023-12-08 RX ORDER — ONDANSETRON HYDROCHLORIDE 2 MG/ML
4 INJECTION, SOLUTION INTRAVENOUS ONCE
Status: COMPLETED | OUTPATIENT
Start: 2023-12-08 | End: 2023-12-08

## 2023-12-08 RX ORDER — ONDANSETRON HYDROCHLORIDE 2 MG/ML
4 INJECTION, SOLUTION INTRAVENOUS EVERY 8 HOURS PRN
Status: DISCONTINUED | OUTPATIENT
Start: 2023-12-08 | End: 2023-12-18 | Stop reason: HOSPADM

## 2023-12-08 RX ORDER — TALC
3 POWDER (GRAM) TOPICAL NIGHTLY PRN
Status: DISCONTINUED | OUTPATIENT
Start: 2023-12-08 | End: 2023-12-18 | Stop reason: HOSPADM

## 2023-12-08 RX ORDER — ATENOLOL 25 MG/1
25 TABLET ORAL NIGHTLY
Status: DISCONTINUED | OUTPATIENT
Start: 2023-12-08 | End: 2023-12-18 | Stop reason: HOSPADM

## 2023-12-08 RX ORDER — DULOXETIN HYDROCHLORIDE 20 MG/1
20 CAPSULE, DELAYED RELEASE ORAL NIGHTLY
Status: DISCONTINUED | OUTPATIENT
Start: 2023-12-08 | End: 2023-12-18 | Stop reason: HOSPADM

## 2023-12-08 RX ORDER — MORPHINE SULFATE 2 MG/ML
1 INJECTION, SOLUTION INTRAMUSCULAR; INTRAVENOUS EVERY 4 HOURS PRN
Status: DISCONTINUED | OUTPATIENT
Start: 2023-12-08 | End: 2023-12-17

## 2023-12-08 RX ORDER — ACETAMINOPHEN 325 MG/1
650 TABLET ORAL EVERY 4 HOURS PRN
Status: DISCONTINUED | OUTPATIENT
Start: 2023-12-08 | End: 2023-12-18 | Stop reason: HOSPADM

## 2023-12-08 RX ORDER — MORPHINE SULFATE 2 MG/ML
2 INJECTION, SOLUTION INTRAMUSCULAR; INTRAVENOUS EVERY 4 HOURS PRN
Status: DISCONTINUED | OUTPATIENT
Start: 2023-12-08 | End: 2023-12-09

## 2023-12-08 RX ADMIN — MORPHINE SULFATE 4 MG: 4 INJECTION, SOLUTION INTRAMUSCULAR; INTRAVENOUS at 15:28

## 2023-12-08 RX ADMIN — IOHEXOL 75 ML: 350 INJECTION, SOLUTION INTRAVENOUS at 20:15

## 2023-12-08 RX ADMIN — ONDANSETRON 4 MG: 2 INJECTION INTRAMUSCULAR; INTRAVENOUS at 19:30

## 2023-12-08 RX ADMIN — PHYTONADIONE 10 MG: 10 INJECTION, EMULSION INTRAMUSCULAR; INTRAVENOUS; SUBCUTANEOUS at 14:38

## 2023-12-08 RX ADMIN — FENTANYL CITRATE 25 MCG: 50 INJECTION, SOLUTION INTRAMUSCULAR; INTRAVENOUS at 15:55

## 2023-12-08 RX ADMIN — SODIUM CHLORIDE 500 ML: 9 INJECTION, SOLUTION INTRAVENOUS at 14:38

## 2023-12-08 RX ADMIN — MORPHINE SULFATE 4 MG: 4 INJECTION, SOLUTION INTRAMUSCULAR; INTRAVENOUS at 13:35

## 2023-12-08 RX ADMIN — MORPHINE SULFATE 4 MG: 4 INJECTION, SOLUTION INTRAMUSCULAR; INTRAVENOUS at 19:57

## 2023-12-08 RX ADMIN — ONDANSETRON 4 MG: 2 INJECTION INTRAMUSCULAR; INTRAVENOUS at 15:55

## 2023-12-08 SDOH — SOCIAL STABILITY: SOCIAL INSECURITY: ARE THERE ANY APPARENT SIGNS OF INJURIES/BEHAVIORS THAT COULD BE RELATED TO ABUSE/NEGLECT?: NO

## 2023-12-08 SDOH — SOCIAL STABILITY: SOCIAL INSECURITY: HAS ANYONE EVER THREATENED TO HURT YOUR FAMILY OR YOUR PETS?: NO

## 2023-12-08 SDOH — SOCIAL STABILITY: SOCIAL INSECURITY: DO YOU FEEL UNSAFE GOING BACK TO THE PLACE WHERE YOU ARE LIVING?: NO

## 2023-12-08 SDOH — SOCIAL STABILITY: SOCIAL INSECURITY: HAVE YOU HAD THOUGHTS OF HARMING ANYONE ELSE?: NO

## 2023-12-08 SDOH — SOCIAL STABILITY: SOCIAL INSECURITY: DOES ANYONE TRY TO KEEP YOU FROM HAVING/CONTACTING OTHER FRIENDS OR DOING THINGS OUTSIDE YOUR HOME?: NO

## 2023-12-08 SDOH — SOCIAL STABILITY: SOCIAL INSECURITY: WERE YOU ABLE TO COMPLETE ALL THE BEHAVIORAL HEALTH SCREENINGS?: YES

## 2023-12-08 SDOH — SOCIAL STABILITY: SOCIAL INSECURITY: DO YOU FEEL ANYONE HAS EXPLOITED OR TAKEN ADVANTAGE OF YOU FINANCIALLY OR OF YOUR PERSONAL PROPERTY?: NO

## 2023-12-08 SDOH — SOCIAL STABILITY: SOCIAL INSECURITY: ABUSE: ADULT

## 2023-12-08 SDOH — SOCIAL STABILITY: SOCIAL INSECURITY: ARE YOU OR HAVE YOU BEEN THREATENED OR ABUSED PHYSICALLY, EMOTIONALLY, OR SEXUALLY BY ANYONE?: NO

## 2023-12-08 ASSESSMENT — PATIENT HEALTH QUESTIONNAIRE - PHQ9
1. LITTLE INTEREST OR PLEASURE IN DOING THINGS: NOT AT ALL
SUM OF ALL RESPONSES TO PHQ9 QUESTIONS 1 & 2: 0
2. FEELING DOWN, DEPRESSED OR HOPELESS: NOT AT ALL

## 2023-12-08 ASSESSMENT — ACTIVITIES OF DAILY LIVING (ADL)
BATHING: DEPENDENT
FEEDING YOURSELF: NEEDS ASSISTANCE
ADEQUATE_TO_COMPLETE_ADL: YES
DRESSING YOURSELF: DEPENDENT
PATIENT'S MEMORY ADEQUATE TO SAFELY COMPLETE DAILY ACTIVITIES?: YES
GROOMING: DEPENDENT
HEARING - LEFT EAR: HEARING AID
WALKS IN HOME: DEPENDENT
TOILETING: DEPENDENT
JUDGMENT_ADEQUATE_SAFELY_COMPLETE_DAILY_ACTIVITIES: UNABLE TO ASSESS
LACK_OF_TRANSPORTATION: NO
HEARING - RIGHT EAR: HEARING AID
ASSISTIVE_DEVICE: WALKER;WHEELCHAIR

## 2023-12-08 ASSESSMENT — PAIN - FUNCTIONAL ASSESSMENT
PAIN_FUNCTIONAL_ASSESSMENT: 0-10

## 2023-12-08 ASSESSMENT — PAIN SCALES - GENERAL
PAINLEVEL_OUTOF10: 6
PAINLEVEL_OUTOF10: 8
PAINLEVEL_OUTOF10: 6
PAINLEVEL_OUTOF10: 5 - MODERATE PAIN
PAINLEVEL_OUTOF10: 0 - NO PAIN

## 2023-12-08 ASSESSMENT — COGNITIVE AND FUNCTIONAL STATUS - GENERAL
EATING MEALS: A LITTLE
MOBILITY SCORE: 10
CLIMB 3 TO 5 STEPS WITH RAILING: TOTAL
PERSONAL GROOMING: A LITTLE
WALKING IN HOSPITAL ROOM: TOTAL
MOVING FROM LYING ON BACK TO SITTING ON SIDE OF FLAT BED WITH BEDRAILS: A LITTLE
TURNING FROM BACK TO SIDE WHILE IN FLAT BAD: A LOT
DRESSING REGULAR LOWER BODY CLOTHING: TOTAL
DAILY ACTIVITIY SCORE: 10
HELP NEEDED FOR BATHING: TOTAL
PATIENT BASELINE BEDBOUND: NO
MOVING TO AND FROM BED TO CHAIR: A LOT
TOILETING: TOTAL
STANDING UP FROM CHAIR USING ARMS: TOTAL
DRESSING REGULAR UPPER BODY CLOTHING: TOTAL

## 2023-12-08 ASSESSMENT — LIFESTYLE VARIABLES
PRESCIPTION_ABUSE_PAST_12_MONTHS: NO
HAVE YOU EVER FELT YOU SHOULD CUT DOWN ON YOUR DRINKING: NO
SKIP TO QUESTIONS 9-10: 1
EVER HAD A DRINK FIRST THING IN THE MORNING TO STEADY YOUR NERVES TO GET RID OF A HANGOVER: NO
SUBSTANCE_ABUSE_PAST_12_MONTHS: NO
AUDIT-C TOTAL SCORE: 0
HOW MANY STANDARD DRINKS CONTAINING ALCOHOL DO YOU HAVE ON A TYPICAL DAY: PATIENT DOES NOT DRINK
HAVE PEOPLE ANNOYED YOU BY CRITICIZING YOUR DRINKING: NO
AUDIT-C TOTAL SCORE: 0
HOW OFTEN DO YOU HAVE A DRINK CONTAINING ALCOHOL: NEVER
REASON UNABLE TO ASSESS: NO
HOW OFTEN DO YOU HAVE 6 OR MORE DRINKS ON ONE OCCASION: NEVER
EVER FELT BAD OR GUILTY ABOUT YOUR DRINKING: NO

## 2023-12-08 ASSESSMENT — PAIN DESCRIPTION - PAIN TYPE: TYPE: ACUTE PAIN

## 2023-12-08 ASSESSMENT — COLUMBIA-SUICIDE SEVERITY RATING SCALE - C-SSRS
2. HAVE YOU ACTUALLY HAD ANY THOUGHTS OF KILLING YOURSELF?: NO
6. HAVE YOU EVER DONE ANYTHING, STARTED TO DO ANYTHING, OR PREPARED TO DO ANYTHING TO END YOUR LIFE?: NO
1. IN THE PAST MONTH, HAVE YOU WISHED YOU WERE DEAD OR WISHED YOU COULD GO TO SLEEP AND NOT WAKE UP?: NO

## 2023-12-08 ASSESSMENT — PAIN DESCRIPTION - LOCATION
LOCATION: ABDOMEN

## 2023-12-08 ASSESSMENT — PAIN DESCRIPTION - ORIENTATION: ORIENTATION: MID;LOWER

## 2023-12-08 ASSESSMENT — PAIN DESCRIPTION - DESCRIPTORS
DESCRIPTORS: CRAMPING
DESCRIPTORS: CRAMPING

## 2023-12-08 ASSESSMENT — PAIN DESCRIPTION - FREQUENCY: FREQUENCY: CONSTANT/CONTINUOUS

## 2023-12-08 NOTE — ED NOTES
1617 Karley from blood bank 2 units of blood ready Nurse Cheryl is aware     Hafsa Easton, EMT  12/08/23 1615

## 2023-12-08 NOTE — ED PROVIDER NOTES
HPI: The patient is a 89-year-old female history of A-fib on warfarin presenting with blood in her diaper.  This been ongoing since yesterday.  She is unsure whether or not it is coming from her urine or from her vagina.  This is around the floor.  She does note ongoing urinary frequency, using the restroom every 2 hours with smaller volumes.  She continues to eat and drink appropriately denies fever, chills chest pain, shortness of breath, nausea, vomiting, abdominal pelvic pain, diarrhea.    Review of Systems:  CONSTITUTIONAL: Denies fever, sweats, chills.  NEURO: Denies difficulty walking, numbness, weakness, tingling, headache.  HEENT: Denies sore throat, rhinorrhea, epistaxis, changes in vision.  CARDIO: Denies chest pain, palpitations.  PULM: Denies shortness of breath, cough.  GI: Denies abdominal pain, nausea, vomiting, diarrhea, constipation, melena, hematochezia.  : Reports painful urination, bleeding from either vagina or urethra. Denies frequency, hematuria.  MSK: Denies recent trauma.  SKIN: Denies rash, lesions.  ENDOCRINE: Denies unexpected weight-loss.  HEME: Denies bleeding disorder.  ------------------------------------------------------------  Past medical history: A-fib, HTN, CAD, GERD  Past surgical history: Reviewed  Social history: Denies tobacco use, alcohol use, recreational drug usage  Family history: Reviewed and noncontributory to today's presentation unless otherwise noted.  ALLERGIES: As documented in EMR were reviewed and discussed with patient.  ------------------------------------------------------------  Physical exam:  VS: As documented in the triage note from today's date and EMR flowsheet were reviewed.  Gen: Frail-appearing.  No acute distress. Seated in bed.  Skin: Warm. Dry. Intact. No rashes or lesions.  Eyes: Pupils equally round and reactive to light. Clear sclera. EOMI.  HENT: Atraumatic appearance. Mucosal membranes moist.  CV: Regular rate and rhythm. S1, S2. No pedal  edema. Warm extremities.  Resp: Nonlabored breathing Clear to auscultation bilaterally.  GI: Soft and nontender. No rebound or guarding.  Pelvic: No adnexal tenderness, external lesions, or lacerations within the vaginal vault.  Blood pooling from the os.  MSK: Symmetric muscle bulk. No joint swelling in the extremities.  Neuro: Alert. Speech fluent. Moving all extremities. No focal deficits  Psych: Appropriate. Kempt.  ------------------------------------------------------------  Hospital Course / Medical Decision Making:  History obtained to the patient.  Records including labs, imaging, notes reviewed.  Patient initially hemodynamically stable at presentation.  Given history, concern was for either vaginal or urinary bleeding.  Before labs were returned, patient became acutely tachycardic with burning pain in her urogenital area.  There is large amount of blood pooling seemingly from the vaginal vault.  Patient was given a 500 cc bolus of fluid to temporize the tachycardia with improvement.  Once labs returned, her INR was notably supratherapeutic at 5.1.  Hemoglobin decreased at 11.8.  GFR diminished at 51, BUN elevated 29.  Patient was given 10 mg of vitamin K and 3 units of FFP and 2 units of packed RBCs were ordered.  PureWick removed approximately 50 cc of blood.  Bleeding appeared to stop after approximately 30 minutes and tachycardia resolved.  Patient was never hypotensive.  Her initial UA was sent but canceled by lab as the entire sample was simply clotted blood from straight cath.  Baer was then placed with return of grossly bloody urine.  A repeat pelvic exam was conducted which demonstrated blood from cervical os.  It appeared this point she was bleeding from both the uterus and urinary systems.  Patient was given 2 doses of morphine with interval improvement in her pain.  Patient's anxiety improved and she was able to rest comfortably with resolution of her hemodynamic instability.  Repeat  urinalysis pending resolution of gross hematuria as samples continue to clot prior to evaluation.  Case was earlier discussed with the OB/GYN on-call who recommended discussing the situation with gynecologic oncology at [facility] [place].  We subsequently talked with Dr. Hamlin with [facility] [place] gynecologic oncology who recommended our continued course of action.  That is, to focus on correcting the INR, getting blood products, and getting the patient hemodynamics debility.  This was achieved in the emergency department with the after mentioned plan.  Dr. Hamlin recommended admission to intermediate care for observation, correction of the INR, and ensuring bleeding is stopped.  At that point, she can be discharged at the discretion of the admitting team be referred to Dr. Nicholson, Dr. Hamlin's gynecologic oncology partner who has clinic at [facility].  Patient and her family were amenable to this plan.  She was subsequently admitted to the medicine service.    I reviewed the case with the attending ED physician. The attending ED physician agrees with the plan. Patient was counseled regarding labs, imaging, likely diagnosis, and plan. All questions were answered.     Brenton Swan MD  Resident  12/08/23 1931

## 2023-12-09 PROBLEM — N13.39 OTHER HYDRONEPHROSIS: Status: ACTIVE | Noted: 2023-12-09

## 2023-12-09 PROBLEM — R10.30 LOWER ABDOMINAL PAIN: Status: ACTIVE | Noted: 2023-12-09

## 2023-12-09 LAB
ANION GAP SERPL CALC-SCNC: 11 MMOL/L (ref 10–20)
BUN SERPL-MCNC: 41 MG/DL (ref 6–23)
CALCIUM SERPL-MCNC: 7.5 MG/DL (ref 8.6–10.3)
CHLORIDE SERPL-SCNC: 108 MMOL/L (ref 98–107)
CO2 SERPL-SCNC: 25 MMOL/L (ref 21–32)
CREAT SERPL-MCNC: 2.33 MG/DL (ref 0.5–1.05)
ERYTHROCYTE [DISTWIDTH] IN BLOOD BY AUTOMATED COUNT: 14.4 % (ref 11.5–14.5)
ERYTHROCYTE [DISTWIDTH] IN BLOOD BY AUTOMATED COUNT: 15.3 % (ref 11.5–14.5)
GFR SERPL CREATININE-BSD FRML MDRD: 19 ML/MIN/1.73M*2
GLUCOSE SERPL-MCNC: 95 MG/DL (ref 74–99)
HCT VFR BLD AUTO: 30.8 % (ref 36–46)
HCT VFR BLD AUTO: 37.6 % (ref 36–46)
HGB BLD-MCNC: 11.8 G/DL (ref 12–16)
HGB BLD-MCNC: 9.6 G/DL (ref 12–16)
INR PPP: 1.3 (ref 0.9–1.1)
MCH RBC QN AUTO: 29.9 PG (ref 26–34)
MCH RBC QN AUTO: 30 PG (ref 26–34)
MCHC RBC AUTO-ENTMCNC: 31.2 G/DL (ref 32–36)
MCHC RBC AUTO-ENTMCNC: 31.4 G/DL (ref 32–36)
MCV RBC AUTO: 95 FL (ref 80–100)
MCV RBC AUTO: 96 FL (ref 80–100)
NRBC BLD-RTO: 0 /100 WBCS (ref 0–0)
NRBC BLD-RTO: 0 /100 WBCS (ref 0–0)
PLATELET # BLD AUTO: 143 X10*3/UL (ref 150–450)
PLATELET # BLD AUTO: 199 X10*3/UL (ref 150–450)
POTASSIUM SERPL-SCNC: 5.6 MMOL/L (ref 3.5–5.3)
PROTHROMBIN TIME: 15.1 SECONDS (ref 9.8–12.8)
RBC # BLD AUTO: 3.2 X10*6/UL (ref 4–5.2)
RBC # BLD AUTO: 3.94 X10*6/UL (ref 4–5.2)
SODIUM SERPL-SCNC: 138 MMOL/L (ref 136–145)
WBC # BLD AUTO: 15.1 X10*3/UL (ref 4.4–11.3)
WBC # BLD AUTO: 17 X10*3/UL (ref 4.4–11.3)

## 2023-12-09 PROCEDURE — 36415 COLL VENOUS BLD VENIPUNCTURE: CPT | Performed by: NURSE PRACTITIONER

## 2023-12-09 PROCEDURE — 2500000001 HC RX 250 WO HCPCS SELF ADMINISTERED DRUGS (ALT 637 FOR MEDICARE OP): Performed by: NURSE PRACTITIONER

## 2023-12-09 PROCEDURE — 80048 BASIC METABOLIC PNL TOTAL CA: CPT | Performed by: NURSE PRACTITIONER

## 2023-12-09 PROCEDURE — 99223 1ST HOSP IP/OBS HIGH 75: CPT | Performed by: STUDENT IN AN ORGANIZED HEALTH CARE EDUCATION/TRAINING PROGRAM

## 2023-12-09 PROCEDURE — 85610 PROTHROMBIN TIME: CPT | Performed by: NURSE PRACTITIONER

## 2023-12-09 PROCEDURE — 85027 COMPLETE CBC AUTOMATED: CPT | Performed by: NURSE PRACTITIONER

## 2023-12-09 PROCEDURE — 2500000005 HC RX 250 GENERAL PHARMACY W/O HCPCS: Performed by: NURSE PRACTITIONER

## 2023-12-09 PROCEDURE — 2500000004 HC RX 250 GENERAL PHARMACY W/ HCPCS (ALT 636 FOR OP/ED): Performed by: NURSE PRACTITIONER

## 2023-12-09 PROCEDURE — 2500000004 HC RX 250 GENERAL PHARMACY W/ HCPCS (ALT 636 FOR OP/ED): Performed by: INTERNAL MEDICINE

## 2023-12-09 PROCEDURE — 1100000001 HC PRIVATE ROOM DAILY

## 2023-12-09 RX ORDER — ACETAMINOPHEN 325 MG/1
325 TABLET ORAL DAILY
Status: ON HOLD | COMMUNITY
End: 2023-12-09 | Stop reason: ENTERED-IN-ERROR

## 2023-12-09 RX ORDER — ACETAMINOPHEN 325 MG/1
325 TABLET ORAL DAILY
Status: DISCONTINUED | OUTPATIENT
Start: 2023-12-09 | End: 2023-12-18 | Stop reason: HOSPADM

## 2023-12-09 RX ORDER — WARFARIN 3 MG/1
3 TABLET ORAL
COMMUNITY
End: 2023-12-18 | Stop reason: HOSPADM

## 2023-12-09 RX ORDER — SODIUM CHLORIDE 9 MG/ML
50 INJECTION, SOLUTION INTRAVENOUS CONTINUOUS
Status: DISCONTINUED | OUTPATIENT
Start: 2023-12-09 | End: 2023-12-09

## 2023-12-09 RX ORDER — DULOXETIN HYDROCHLORIDE 20 MG/1
20 CAPSULE, DELAYED RELEASE ORAL NIGHTLY
Status: DISCONTINUED | OUTPATIENT
Start: 2023-12-09 | End: 2023-12-09

## 2023-12-09 RX ORDER — SODIUM CHLORIDE 9 MG/ML
100 INJECTION, SOLUTION INTRAVENOUS CONTINUOUS
Status: ACTIVE | OUTPATIENT
Start: 2023-12-09 | End: 2023-12-10

## 2023-12-09 RX ORDER — FERROUS SULFATE 325(65) MG
65 TABLET ORAL
Status: DISCONTINUED | OUTPATIENT
Start: 2023-12-10 | End: 2023-12-18 | Stop reason: HOSPADM

## 2023-12-09 RX ORDER — ATENOLOL 25 MG/1
25 TABLET ORAL NIGHTLY
Status: DISCONTINUED | OUTPATIENT
Start: 2023-12-09 | End: 2023-12-09

## 2023-12-09 RX ORDER — DULOXETIN HYDROCHLORIDE 20 MG/1
20 CAPSULE, DELAYED RELEASE ORAL NIGHTLY
COMMUNITY

## 2023-12-09 RX ORDER — GABAPENTIN 300 MG/1
600 CAPSULE ORAL NIGHTLY
COMMUNITY
End: 2023-12-18 | Stop reason: HOSPADM

## 2023-12-09 RX ORDER — GABAPENTIN 300 MG/1
300 CAPSULE ORAL DAILY
Status: DISCONTINUED | OUTPATIENT
Start: 2023-12-09 | End: 2023-12-13

## 2023-12-09 RX ORDER — GABAPENTIN 600 MG/1
600 TABLET ORAL NIGHTLY
Status: DISCONTINUED | OUTPATIENT
Start: 2023-12-09 | End: 2023-12-09

## 2023-12-09 RX ORDER — MORPHINE SULFATE 2 MG/ML
2 INJECTION, SOLUTION INTRAMUSCULAR; INTRAVENOUS EVERY 4 HOURS PRN
Status: DISCONTINUED | OUTPATIENT
Start: 2023-12-09 | End: 2023-12-17

## 2023-12-09 RX ORDER — WARFARIN SODIUM 5 MG/1
5 TABLET ORAL DAILY
COMMUNITY
End: 2023-12-18 | Stop reason: HOSPADM

## 2023-12-09 RX ORDER — ACETAMINOPHEN 325 MG/1
650 TABLET ORAL EVERY 4 HOURS PRN
COMMUNITY

## 2023-12-09 RX ORDER — FERROUS SULFATE 325(65) MG
65 TABLET, DELAYED RELEASE (ENTERIC COATED) ORAL
COMMUNITY

## 2023-12-09 RX ORDER — GABAPENTIN 300 MG/1
300 CAPSULE ORAL DAILY
COMMUNITY
End: 2023-12-18 | Stop reason: HOSPADM

## 2023-12-09 RX ORDER — LIDOCAINE 560 MG/1
1 PATCH PERCUTANEOUS; TOPICAL; TRANSDERMAL DAILY
Status: DISCONTINUED | OUTPATIENT
Start: 2023-12-09 | End: 2023-12-18 | Stop reason: HOSPADM

## 2023-12-09 RX ORDER — ATENOLOL 25 MG/1
25 TABLET ORAL NIGHTLY
COMMUNITY

## 2023-12-09 RX ADMIN — GABAPENTIN 600 MG: 300 CAPSULE ORAL at 21:47

## 2023-12-09 RX ADMIN — DULOXETINE HYDROCHLORIDE 20 MG: 20 CAPSULE, DELAYED RELEASE ORAL at 21:45

## 2023-12-09 RX ADMIN — GABAPENTIN 300 MG: 300 CAPSULE ORAL at 15:13

## 2023-12-09 RX ADMIN — SODIUM CHLORIDE 50 ML/HR: 9 INJECTION, SOLUTION INTRAVENOUS at 08:32

## 2023-12-09 RX ADMIN — SODIUM CHLORIDE 100 ML/HR: 9 INJECTION, SOLUTION INTRAVENOUS at 21:49

## 2023-12-09 RX ADMIN — LIDOCAINE 1 PATCH: 4 PATCH TOPICAL at 15:13

## 2023-12-09 RX ADMIN — ATENOLOL 25 MG: 25 TABLET ORAL at 00:01

## 2023-12-09 RX ADMIN — GABAPENTIN 600 MG: 300 CAPSULE ORAL at 00:01

## 2023-12-09 RX ADMIN — ATENOLOL 25 MG: 25 TABLET ORAL at 21:47

## 2023-12-09 RX ADMIN — ACETAMINOPHEN 325 MG: 325 TABLET ORAL at 15:12

## 2023-12-09 RX ADMIN — GABAPENTIN 600 MG: 300 CAPSULE ORAL at 21:46

## 2023-12-09 RX ADMIN — DULOXETINE HYDROCHLORIDE 20 MG: 20 CAPSULE, DELAYED RELEASE ORAL at 00:20

## 2023-12-09 ASSESSMENT — ENCOUNTER SYMPTOMS
ABDOMINAL PAIN: 1
HEMATURIA: 1
BLOOD IN STOOL: 0
DYSURIA: 1
VOMITING: 0
CARDIOVASCULAR NEGATIVE: 1
EYES NEGATIVE: 1
SHORTNESS OF BREATH: 0
ENDOCRINE NEGATIVE: 1
MUSCULOSKELETAL NEGATIVE: 1
RESPIRATORY NEGATIVE: 1
FEVER: 0
CHEST TIGHTNESS: 0
APPETITE CHANGE: 0
AGITATION: 0
CONSTIPATION: 0
CHILLS: 0
CONSTITUTIONAL NEGATIVE: 1
CONFUSION: 0
NAUSEA: 0
ABDOMINAL DISTENTION: 0
DIARRHEA: 0
NEUROLOGICAL NEGATIVE: 1

## 2023-12-09 ASSESSMENT — PAIN - FUNCTIONAL ASSESSMENT
PAIN_FUNCTIONAL_ASSESSMENT: 0-10

## 2023-12-09 ASSESSMENT — PAIN SCALES - GENERAL
PAINLEVEL_OUTOF10: 0 - NO PAIN
PAINLEVEL_OUTOF10: 7

## 2023-12-09 ASSESSMENT — COGNITIVE AND FUNCTIONAL STATUS - GENERAL
PERSONAL GROOMING: A LITTLE
STANDING UP FROM CHAIR USING ARMS: A LITTLE
EATING MEALS: A LITTLE
TOILETING: A LITTLE
DRESSING REGULAR UPPER BODY CLOTHING: A LITTLE
TURNING FROM BACK TO SIDE WHILE IN FLAT BAD: A LITTLE
DRESSING REGULAR LOWER BODY CLOTHING: A LITTLE
HELP NEEDED FOR BATHING: A LITTLE
MOVING TO AND FROM BED TO CHAIR: A LITTLE
CLIMB 3 TO 5 STEPS WITH RAILING: A LOT
WALKING IN HOSPITAL ROOM: A LITTLE
DAILY ACTIVITIY SCORE: 18
MOVING FROM LYING ON BACK TO SITTING ON SIDE OF FLAT BED WITH BEDRAILS: A LITTLE
MOBILITY SCORE: 17

## 2023-12-09 NOTE — H&P
History Of Present Illness  Noemí Hinton is a 89 y.o. female presenting with 89-year-old female who has a history of A-fib she has a history of CAD status post cardiac stents, anxiety and multiple other medical problems.  Patient is a DNR CC.  She is from a local nursing facility apparently patient went to the restroom last evening and did have blood in her depends.  She has now developed severe hematuria.  She was supratherapeutic on her INR.  And I have reviewed all the ER notes..     Past Medical History  Past Medical History:   Diagnosis Date    Anxiety 2023    Arthritis     Atrial fibrillation (CMS/HCC) 2023    Breast mass, right 2019    Coronary artery disease due to lipid rich plaque 2023    GERD (gastroesophageal reflux disease) 2023    Hip fracture, left (CMS/HCC) 2023    History of femur fracture     Left    HLD (hyperlipidemia)     Incontinence of urine     Myocardial infarction (CMS/[facility])     Neuropathy 2023    Sciatica        Surgical History  Past Surgical History:   Procedure Laterality Date     SECTION, CLASSIC      x 2    CORONARY ANGIOPLASTY WITH STENT PLACEMENT      4 stents in  and 2 stents in     HIP FRACTURE SURGERY Left     ORIF FEMUR FRACTURE Left         Social History  She reports that she has never smoked. She has never used smokeless tobacco. She reports that she does not drink alcohol and does not use drugs.    Family History  Family History   Problem Relation Name Age of Onset    Coronary artery disease Mother      Heart failure Mother      Coronary artery disease Father      Heart attack Father          Allergies  Codeine, Hydrocodone, and Tramadol    Review of Systems  Lethargic slow to answer questions unobtainable  Physical Exam  Constitutional:       Appearance: Normal appearance. She is obese.   HENT:      Head: Normocephalic and atraumatic.      Nose: Nose normal.      Mouth/Throat:      Mouth: Mucous membranes are moist.  "  Eyes:      Extraocular Movements: Extraocular movements intact.      Conjunctiva/sclera: Conjunctivae normal.      Pupils: Pupils are equal, round, and reactive to light.   Cardiovascular:      Rate and Rhythm: Normal rate and regular rhythm.      Pulses: Normal pulses.   Pulmonary:      Effort: Pulmonary effort is normal.   Abdominal:      General: Abdomen is flat. Bowel sounds are normal.      Palpations: Abdomen is soft.   Musculoskeletal:         General: Normal range of motion.      Cervical back: Normal range of motion and neck supple.   Skin:     General: Skin is warm and dry.   Neurological:      General: No focal deficit present.      Mental Status: She is alert. She is disoriented.   Psychiatric:         Mood and Affect: Mood normal.         Behavior: Behavior normal.         Thought Content: Thought content normal.         Judgment: Judgment normal.       Baer catheter noted with hematuria  Last Recorded Vitals  Blood pressure 134/66, pulse 60, temperature 35.9 °C (96.6 °F), temperature source Temporal, resp. rate 16, height 1.651 m (5' 5\"), weight 62.4 kg (137 lb 9.6 oz), SpO2 100 %.    Relevant Results    Scheduled medications  atenolol, 25 mg, oral, Nightly  DULoxetine, 20 mg, oral, Nightly  gabapentin, 600 mg, oral, Nightly      Continuous medications     PRN medications  PRN medications: acetaminophen, melatonin, morphine, morphine, ondansetron ODT **OR** ondansetron  Results for orders placed or performed during the hospital encounter of 12/08/23 (from the past 24 hour(s))   VERIFY ABO/Rh Group Test   Result Value Ref Range    ABO TYPE A     Rh TYPE POS    CBC and Auto Differential   Result Value Ref Range    WBC 10.2 4.4 - 11.3 x10*3/uL    nRBC 0.0 0.0 - 0.0 /100 WBCs    RBC 4.07 4.00 - 5.20 x10*6/uL    Hemoglobin 11.8 (L) 12.0 - 16.0 g/dL    Hematocrit 38.1 36.0 - 46.0 %    MCV 94 80 - 100 fL    MCH 29.0 26.0 - 34.0 pg    MCHC 31.0 (L) 32.0 - 36.0 g/dL    RDW 15.1 (H) 11.5 - 14.5 %    Platelets " 250 150 - 450 x10*3/uL    Neutrophils % 78.6 40.0 - 80.0 %    Immature Granulocytes %, Automated 0.7 0.0 - 0.9 %    Lymphocytes % 10.3 13.0 - 44.0 %    Monocytes % 8.4 2.0 - 10.0 %    Eosinophils % 1.3 0.0 - 6.0 %    Basophils % 0.7 0.0 - 2.0 %    Neutrophils Absolute 8.06 (H) 1.60 - 5.50 x10*3/uL    Immature Granulocytes Absolute, Automated 0.07 0.00 - 0.50 x10*3/uL    Lymphocytes Absolute 1.05 0.80 - 3.00 x10*3/uL    Monocytes Absolute 0.86 (H) 0.05 - 0.80 x10*3/uL    Eosinophils Absolute 0.13 0.00 - 0.40 x10*3/uL    Basophils Absolute 0.07 0.00 - 0.10 x10*3/uL   Comprehensive metabolic panel   Result Value Ref Range    Glucose 94 74 - 99 mg/dL    Sodium 138 136 - 145 mmol/L    Potassium 4.2 3.5 - 5.3 mmol/L    Chloride 106 98 - 107 mmol/L    Bicarbonate 26 21 - 32 mmol/L    Anion Gap 10 10 - 20 mmol/L    Urea Nitrogen 29 (H) 6 - 23 mg/dL    Creatinine 1.01 0.50 - 1.05 mg/dL    eGFR 51 (L) >60 mL/min/1.73m*2    Calcium 8.7 8.6 - 10.3 mg/dL    Albumin 3.2 (L) 3.4 - 5.0 g/dL    Alkaline Phosphatase 86 33 - 136 U/L    Total Protein 6.0 (L) 6.4 - 8.2 g/dL    AST 14 9 - 39 U/L    Bilirubin, Total 0.6 0.0 - 1.2 mg/dL    ALT 7 7 - 45 U/L   Protime-INR   Result Value Ref Range    Protime 58.7 (HH) 9.8 - 12.8 seconds    INR 5.1 (HH) 0.9 - 1.1   Troponin I, High Sensitivity   Result Value Ref Range    Troponin I, High Sensitivity 5 0 - 13 ng/L   Prepare RBC: 2 Units   Result Value Ref Range    PRODUCT CODE U6157N20     Unit Number [id number]     Unit ABO A     Unit RH POS     XM INTEP COMP     Dispense Status TR     Blood Expiration Date December 29, 2023 23:59 EST     PRODUCT BLOOD TYPE 6200     UNIT VOLUME 350     PRODUCT CODE I9835Q06     Unit Number [id number]     Unit ABO A     Unit RH POS     XM INTEP COMP     Dispense Status TR     Blood Expiration Date December 25, 2023 23:59 EST     PRODUCT BLOOD TYPE 6200     UNIT VOLUME 350    Prepare Plasma: 3 Units   Result Value Ref Range    PRODUCT CODE K0355Q19      Unit Number [id number]     Unit ABO A     Unit RH POS     Dispense Status TR     Blood Expiration Date December 13, 2023 16:45 EST     PRODUCT BLOOD TYPE 6200     UNIT VOLUME 337    Type and screen   Result Value Ref Range    ABO TYPE A     Rh TYPE POS     ANTIBODY SCREEN NEG    Lavender Top   Result Value Ref Range    Extra Tube Hold for add-ons.    Urinalysis with Reflex Microscopic and Culture   Result Value Ref Range    Color, Urine Red (N) Straw, Yellow    Appearance, Urine Turbid (N) Clear    Specific Gravity, Urine 1.020 1.005 - 1.035    pH, Urine 6.5 5.0, 5.5, 6.0, 6.5, 7.0, 7.5, 8.0    Protein, Urine 100 (2+) (A) NEGATIVE, 10 (TRACE) mg/dL    Glucose, Urine NEGATIVE NEGATIVE mg/dL    Blood, Urine 1.0 (3+) (A) NEGATIVE    Ketones, Urine NEGATIVE NEGATIVE mg/dL    Bilirubin, Urine NEGATIVE NEGATIVE    Urobilinogen, Urine NORM NORM mg/dL    Nitrite, Urine NEGATIVE NEGATIVE    Leukocyte Esterase, Urine 75 Leonor/µL (A) NEGATIVE   Extra Urine Gray Tube   Result Value Ref Range    Extra Tube Hold for add-ons.    Microscopic Only, Urine   Result Value Ref Range    WBC, Urine >50 (A) 1-5, NONE /HPF    RBC, Urine >20 (A) NONE, 1-2, 3-5 /HPF   CBC   Result Value Ref Range    WBC 17.0 (H) 4.4 - 11.3 x10*3/uL    nRBC 0.0 0.0 - 0.0 /100 WBCs    RBC 3.94 (L) 4.00 - 5.20 x10*6/uL    Hemoglobin 11.8 (L) 12.0 - 16.0 g/dL    Hematocrit 37.6 36.0 - 46.0 %    MCV 95 80 - 100 fL    MCH 29.9 26.0 - 34.0 pg    MCHC 31.4 (L) 32.0 - 36.0 g/dL    RDW 14.4 11.5 - 14.5 %    Platelets 199 150 - 450 x10*3/uL        Patient seen chart reviewed Labs reviewed     Assessment/Plan   Principal Problem:    Hematuria  Active Problems:    Urinary frequency    Supratherapeutic INR    Dysuria    Hematoma of bladder wall    Lower abdominal pain    Other hydronephrosis      Hold anticoagulants  IV fluids  Urology evaluation pending  Repeat H&H today       I spent 39 minutes in the professional and overall care of this patient.      Ciro Garcia,  MD

## 2023-12-09 NOTE — CARE PLAN
The patient's goals for the shift include PATIENT WILL REMAIN SAFE FROM FALLS AND INJURIES DURING THIS SHIFT    The clinical goals for the shift include safety, pain control, amos care    Problem: Pain  Goal: My pain/discomfort is manageable  Outcome: Progressing     Problem: Safety  Goal: Patient will be injury free during hospitalization  Outcome: Progressing  Goal: I will remain free of falls  Outcome: Progressing     Problem: Daily Care  Goal: Daily care needs are met  Outcome: Progressing     Problem: Psychosocial Needs  Goal: Demonstrates ability to cope with hospitalization/illness  Outcome: Progressing  Goal: Collaborate with me, my family, and caregiver to identify my specific goals  Outcome: Progressing     Problem: Discharge Barriers  Goal: My discharge needs are met  Outcome: Progressing     Problem: Skin  Goal: Decreased wound size/increased tissue granulation at next dressing change  Outcome: Progressing  Goal: Participates in plan/prevention/treatment measures  Outcome: Progressing  Goal: Prevent/manage excess moisture  Outcome: Progressing  Flowsheets (Taken 12/9/2023 1133)  Prevent/manage excess moisture:   Moisturize dry skin   Monitor for/manage infection if present   Cleanse incontinence/protect with barrier cream  Goal: Prevent/minimize sheer/friction injuries  Outcome: Progressing  Goal: Promote/optimize nutrition  Outcome: Progressing  Goal: Promote skin healing  Outcome: Progressing

## 2023-12-09 NOTE — H&P
History Of Present Illness  Noemí Hinton is a 89 y.o. female with medical history of atrial fibrillation on warfarin, CAD and MI s/p cardiac stents, anxiety, history of falls and known right breast mass presented to [facility] on 12/8/2023 with complaint of bleeding from vagina or bladder.  Daughter at bedside provided most of the information as patient was sleeping after receiving pain medication.  Daughter at bedside is POA and verifies patient's DNRCC status.  Patient is currently at [facility] for respite care, however family wishes patient does not return upon discharge.  Daughter was with patient yesterday afternoon and patient was in her normal state of health.  Last evening patient called daughter because she had gone to the restroom and noticed blood in her depends.  Patient is typically incontinent.  Patient was unable to differentiate whether blood was coming from her urethra or vagina.  Patient had no other complaints until arriving to the emergency department when she began having lower abdominal/pelvic pain. Patient denies recent fever/chills, cough, cold symptoms, chest pain, palpitations, lightheadedness/dizziness, shortness of breath, N/V/D or leg swelling.    Upon evaluation in the emergency department patient had CT scan of A/P with IV contrast which reported multiple renal cysts as well as a large amount of hemorrhage filling the entire bladder with hematoma measuring 9.7 cm and clot within the bilateral pelvocaliceal systems and bilateral ureters with mild hydronephrosis.  Patient's INR was 5.1 on warfarin.  Patient became tachycardic in the emergency department so decision was made to give patient vitamin K, 1 unit FFP and 2 units PRBC.  Baer catheter is in place draining milton red blood.  Patient will be admitted with urology on consult for further evaluation and management.    Past Medical History  Past Medical History:   Diagnosis Date    Anxiety 09/14/2023     Arthritis     Atrial fibrillation (CMS/[facility]) 2023    Breast mass, right 2019    Coronary artery disease due to lipid rich plaque 2023    GERD (gastroesophageal reflux disease) 2023    Hip fracture, left (CMS/[facility]) 2023    History of femur fracture     Left    HLD (hyperlipidemia)     Incontinence of urine     Myocardial infarction (CMS/[facility])     Neuropathy 2023    Sciatica        Surgical History  Past Surgical History:   Procedure Laterality Date     SECTION, CLASSIC      x 2    CORONARY ANGIOPLASTY WITH STENT PLACEMENT      4 stents in  and 2 stents in     HIP FRACTURE SURGERY Left     ORIF FEMUR FRACTURE Left         Social History  Social History     Tobacco Use    Smoking status: Never    Smokeless tobacco: Never   Vaping Use    Vaping Use: Never used   Substance Use Topics    Alcohol use: Never    Drug use: Never        Family History  Family History   Problem Relation Name Age of Onset    Coronary artery disease Mother      Heart failure Mother      Coronary artery disease Father      Heart attack Father          Allergies  Codeine, Hydrocodone, and Tramadol    Review of Systems   Constitutional: Negative.  Negative for appetite change, chills and fever.   HENT: Negative.     Eyes: Negative.    Respiratory:  Negative for chest tightness and shortness of breath.    Cardiovascular: Negative.    Gastrointestinal:  Positive for abdominal pain. Negative for abdominal distention, blood in stool, constipation, diarrhea, nausea and vomiting.   Endocrine: Negative.    Genitourinary:  Positive for dysuria, hematuria, pelvic pain and urgency.   Musculoskeletal: Negative.    Skin: Negative.    Neurological: Negative.    Psychiatric/Behavioral:  Negative for agitation, behavioral problems and confusion.        Physical Exam    Last Recorded Vitals  Visit Vitals  /77 (BP Location: Left arm, Patient Position: Lying)   Pulse 77   Temp 36.1 °C (97 °F) (Temporal)   Resp 18   Ht  "1.651 m (5' 5\")   Wt 62.4 kg (137 lb 9.6 oz)   SpO2 96%   BMI 22.90 kg/m²   Smoking Status Never   BSA 1.69 m²        Relevant Results  Results for orders placed or performed during the hospital encounter of 12/08/23 (from the past 24 hour(s))   VERIFY ABO/Rh Group Test   Result Value Ref Range    ABO TYPE A     Rh TYPE POS    CBC and Auto Differential   Result Value Ref Range    WBC 10.2 4.4 - 11.3 x10*3/uL    nRBC 0.0 0.0 - 0.0 /100 WBCs    RBC 4.07 4.00 - 5.20 x10*6/uL    Hemoglobin 11.8 (L) 12.0 - 16.0 g/dL    Hematocrit 38.1 36.0 - 46.0 %    MCV 94 80 - 100 fL    MCH 29.0 26.0 - 34.0 pg    MCHC 31.0 (L) 32.0 - 36.0 g/dL    RDW 15.1 (H) 11.5 - 14.5 %    Platelets 250 150 - 450 x10*3/uL    Neutrophils % 78.6 40.0 - 80.0 %    Immature Granulocytes %, Automated 0.7 0.0 - 0.9 %    Lymphocytes % 10.3 13.0 - 44.0 %    Monocytes % 8.4 2.0 - 10.0 %    Eosinophils % 1.3 0.0 - 6.0 %    Basophils % 0.7 0.0 - 2.0 %    Neutrophils Absolute 8.06 (H) 1.60 - 5.50 x10*3/uL    Immature Granulocytes Absolute, Automated 0.07 0.00 - 0.50 x10*3/uL    Lymphocytes Absolute 1.05 0.80 - 3.00 x10*3/uL    Monocytes Absolute 0.86 (H) 0.05 - 0.80 x10*3/uL    Eosinophils Absolute 0.13 0.00 - 0.40 x10*3/uL    Basophils Absolute 0.07 0.00 - 0.10 x10*3/uL   Comprehensive metabolic panel   Result Value Ref Range    Glucose 94 74 - 99 mg/dL    Sodium 138 136 - 145 mmol/L    Potassium 4.2 3.5 - 5.3 mmol/L    Chloride 106 98 - 107 mmol/L    Bicarbonate 26 21 - 32 mmol/L    Anion Gap 10 10 - 20 mmol/L    Urea Nitrogen 29 (H) 6 - 23 mg/dL    Creatinine 1.01 0.50 - 1.05 mg/dL    eGFR 51 (L) >60 mL/min/1.73m*2    Calcium 8.7 8.6 - 10.3 mg/dL    Albumin 3.2 (L) 3.4 - 5.0 g/dL    Alkaline Phosphatase 86 33 - 136 U/L    Total Protein 6.0 (L) 6.4 - 8.2 g/dL    AST 14 9 - 39 U/L    Bilirubin, Total 0.6 0.0 - 1.2 mg/dL    ALT 7 7 - 45 U/L   Protime-INR   Result Value Ref Range    Protime 58.7 (HH) 9.8 - 12.8 seconds    INR 5.1 (HH) 0.9 - 1.1   Troponin I, " High Sensitivity   Result Value Ref Range    Troponin I, High Sensitivity 5 0 - 13 ng/L   Prepare RBC: 2 Units   Result Value Ref Range    PRODUCT CODE C2092N56     Unit Number [id number]     Unit ABO A     Unit RH POS     XM INTEP COMP     Dispense Status TR     Blood Expiration Date December 29, 2023 23:59 EST     PRODUCT BLOOD TYPE 6200     UNIT VOLUME 350     PRODUCT CODE F3702A86     Unit Number [id number]     Unit ABO A     Unit RH POS     XM INTEP COMP     Dispense Status TR     Blood Expiration Date December 25, 2023 23:59 EST     PRODUCT BLOOD TYPE 6200     UNIT VOLUME 350    Prepare Plasma: 3 Units   Result Value Ref Range    PRODUCT CODE J9978L47     Unit Number [id number]     Unit ABO A     Unit RH POS     Dispense Status TR     Blood Expiration Date December 13, 2023 16:45 EST     PRODUCT BLOOD TYPE 6200     UNIT VOLUME 337    Type and screen   Result Value Ref Range    ABO TYPE A     Rh TYPE POS     ANTIBODY SCREEN NEG    Lavender Top   Result Value Ref Range    Extra Tube Hold for add-ons.    Urinalysis with Reflex Microscopic and Culture   Result Value Ref Range    Color, Urine Red (N) Straw, Yellow    Appearance, Urine Turbid (N) Clear    Specific Gravity, Urine 1.020 1.005 - 1.035    pH, Urine 6.5 5.0, 5.5, 6.0, 6.5, 7.0, 7.5, 8.0    Protein, Urine 100 (2+) (A) NEGATIVE, 10 (TRACE) mg/dL    Glucose, Urine NEGATIVE NEGATIVE mg/dL    Blood, Urine 1.0 (3+) (A) NEGATIVE    Ketones, Urine NEGATIVE NEGATIVE mg/dL    Bilirubin, Urine NEGATIVE NEGATIVE    Urobilinogen, Urine NORM NORM mg/dL    Nitrite, Urine NEGATIVE NEGATIVE    Leukocyte Esterase, Urine 75 Leonor/µL (A) NEGATIVE   Extra Urine Gray Tube   Result Value Ref Range    Extra Tube Hold for add-ons.    Microscopic Only, Urine   Result Value Ref Range    WBC, Urine >50 (A) 1-5, NONE /HPF    RBC, Urine >20 (A) NONE, 1-2, 3-5 /HPF   CBC   Result Value Ref Range    WBC 17.0 (H) 4.4 - 11.3 x10*3/uL    nRBC 0.0 0.0 - 0.0 /100 WBCs    RBC  3.94 (L) 4.00 - 5.20 x10*6/uL    Hemoglobin 11.8 (L) 12.0 - 16.0 g/dL    Hematocrit 37.6 36.0 - 46.0 %    MCV 95 80 - 100 fL    MCH 29.9 26.0 - 34.0 pg    MCHC 31.4 (L) 32.0 - 36.0 g/dL    RDW 14.4 11.5 - 14.5 %    Platelets 199 150 - 450 x10*3/uL      Imaging:  CT abdomen pelvis w IV contrast   Final Result   1. Massive amount of hemorrhage filling the entire urinary bladder in   which there is a hematoma measuring 9.7 x 9.1 x 8.6 cm within the   urinary bladder lumen. Additional hemorrhage seen in the bilateral   renal pelvocaliceal systems and ureters with mild bilateral   hydronephrosis. Baer catheter terminates in the urinary bladder.   Urology consultation recommended        2. Significant interval enlargement of locally invasive enhancing   right breast malignancies. There is a subareolar component measuring   5.4 cm x 5.4 cm (previously 3.5 cm x 2.6 cm). Just superior to this   is a larger more infiltrative component measuring 8.1 cm x 4.3 cm   (previously 5.4 cm x 2.3 cm). No evidence of metastatic disease in   the abdomen or pelvis.        3. Subacute-chronic bilateral sacral insufficiency fractures are new   from 04/20/2021 most likely occurred at time of the left   intertrochanteric femur fracture (08/16/2023), and which is   healed/near completely healed.        4. Severe colonic diverticulosis and additional chronic nonacute   findings as above. .        5. Small right inguinal hernia containing short segment of nondilated   small bowel and the tip of the normal appendix.        MACRO:   None.        Signed by: Chuck Barahona 12/8/2023 9:15 PM   Dictation workstation:   [id number]        EKG:  No results found for this or any previous visit (from the past 4464 hour(s)).  Echo:  No results found for this or any previous visit.    Home Medications  Prior to Admission medications    Medication Sig Start Date End Date Taking? Authorizing Provider   lidocaine (Lidoderm) 5 % patch APPLY TO LEFT OR RIGHT  SHOULDER 1 PATCH TOPICALLY ONE TIME A DAY AS NEEDED (ON FOR 12 HOURS AND OFF FOR 12 HOURS) 10/26/23   Talon Cullen DO   PharbetoL 325 mg tablet 1 TABLET BY MOUTH ONE TIME A DAY 10/26/23   Talon Cullen DO   Med rec incomplete and not verified with pharmacy at this time    Medications  Scheduled medications  atenolol, 25 mg, oral, Nightly  DULoxetine, 20 mg, oral, Nightly  gabapentin, 600 mg, oral, Nightly      Continuous medications     PRN medications  acetaminophen, 650 mg, q4h PRN  melatonin, 3 mg, Nightly PRN  morphine, 1 mg, q4h PRN  morphine, 2 mg, q4h PRN  ondansetron ODT, 4 mg, q8h PRN   Or  ondansetron, 4 mg, q8h PRN        Assessment/Plan   Principal Problem:    Hematuria  Active Problems:    Urinary frequency    Supratherapeutic INR    Dysuria    Hematoma of bladder wall    Lower abdominal pain    Other hydronephrosis        Plan:  -Hold home warfarin due to active bleeding  -INR 5.1, monitor daily  -Received Vitamin K, 1 unit FFP and 2 unit PRBCs in ER, patient became tachycardic  -Baer placed in ER with milton red blood  -CT abd w/IV contrast reporting massive amount of hemorrhage filling the entire urinary bladder in which there is a hematoma measuring 9.7 x 9.1 x 8.6 cm within the urinary bladder with additional hemorrhage seen in the bilateral renal pelvocaliceal systems and ureters with mild bilateral hydronephrosis  -Urology consult  -Pain medication as needed      VTE prophylaxis:   DVT prophylaxis: Deferred, not warranted due to patient's condition    Medication reconciliation to be completed when home medications are verified by pharmacy.   See additional orders for further plan of care.   Further evaluation and management per attending and consulting physicians.      Code Status  DNR Comfort Measures Only, discussed and verified with daughter-POA at bedside    I spent 50 minutes in the professional and overall care of this patient.      Veronica Medina, APRN-CNP  Med  House  Pager [phone]

## 2023-12-09 NOTE — CONSULTS
Reason For Consult  Patient is a 89-year-old female with a past medical history significant for coronary artery disease, hyperlipidemia, MI, atrial fibrillation on anticoagulation who presented with gross hematuria  Patient is a nursing home and reported that she went to the bathroom and noticed significant amount of blood and clots  In the emergency room she was found to have an INR of 5.1  The CT scan demonstrates copious blood clots in the bladder as well as the collecting system  She has undergone multiple units of blood transfusion along with plasma  Her most recent H&H has been at 11.8 and 37.6    Encounter this morning the patient is responsive and conversive  I had an extensive discussion with the patient that patient may benefit from continuous bladder irrigation to help alleviate some of the clots in the bladder  Alternatively we could irrigate the bladder routinely to evacuate the clots  Patient does not want us to change the catheter out as she had discomfort with the previous placement  She has an 18 Irish catheter in place    Of note, patient is comfort care only      Extracted from chart review:  Noemí Hinton is a 89 y.o. female presenting with 89-year-old female who has a history of A-fib she has a history of CAD status post cardiac stents, anxiety and multiple other medical problems.  Patient is a DNR CC.  She is from a local nursing facility apparently patient went to the restroom last evening and did have blood in her depends.  She has now developed severe hematuria.  She was supratherapeutic on her INR.  And I have reviewed all the ER notes..      Past Medical History  She has a past medical history of Anxiety (09/14/2023), Arthritis, Atrial fibrillation (CMS/HCC) (08/24/2023), Breast mass, right (2019), Coronary artery disease due to lipid rich plaque (08/24/2023), GERD (gastroesophageal reflux disease) (08/24/2023), Hip fracture, left (CMS/HCC) (08/24/2023), History of femur fracture, HLD  "(hyperlipidemia), Incontinence of urine, Myocardial infarction (CMS/[facility]), Neuropathy (2023), and Sciatica.    Surgical History  She has a past surgical history that includes Coronary angioplasty with stent; Hip fracture surgery (Left); ORIF femur fracture (Left); and  section, classic.     Social History  She reports that she has never smoked. She has never used smokeless tobacco. She reports that she does not drink alcohol and does not use drugs.    Family History  Family History   Problem Relation Name Age of Onset    Coronary artery disease Mother      Heart failure Mother      Coronary artery disease Father      Heart attack Father          Allergies  Codeine, Hydrocodone, and Tramadol    Review of Systems  Review of Systems   Constitutional: Negative.   HENT: Negative.     Eyes: Negative.    Respiratory: Negative.     Hematologic/Lymphatic: Positive for bleeding problem.   Musculoskeletal:  Positive for arthritis.   Genitourinary:  Positive for hematuria.   All other systems reviewed and are negative.        Physical Exam  Physical Exam  Vitals and nursing note reviewed.   HENT:      Head: Normocephalic.   Eyes:      Pupils: Pupils are equal, round, and reactive to light.   Cardiovascular:      Rate and Rhythm: Normal rate.   Pulmonary:      Effort: Pulmonary effort is normal.   Genitourinary:     Comments: Patient has gross hematuria. Catheter is draining well and irrigated.  Musculoskeletal:         General: Normal range of motion.      Cervical back: Normal range of motion.   Neurological:      General: No focal deficit present.      Mental Status: She is alert.   Psychiatric:         Mood and Affect: Mood normal.        Last Recorded Vitals  Blood pressure 104/55, pulse (!) 17, temperature 36 °C (96.8 °F), temperature source Temporal, resp. rate 17, height 1.651 m (5' 5\"), weight 62.4 kg (137 lb 9.6 oz), SpO2 98 %.    Relevant Results  Results for orders placed or performed during the hospital " encounter of 12/08/23 (from the past 96 hour(s))   VERIFY ABO/Rh Group Test   Result Value Ref Range    ABO TYPE A     Rh TYPE POS    CBC and Auto Differential   Result Value Ref Range    WBC 10.2 4.4 - 11.3 x10*3/uL    nRBC 0.0 0.0 - 0.0 /100 WBCs    RBC 4.07 4.00 - 5.20 x10*6/uL    Hemoglobin 11.8 (L) 12.0 - 16.0 g/dL    Hematocrit 38.1 36.0 - 46.0 %    MCV 94 80 - 100 fL    MCH 29.0 26.0 - 34.0 pg    MCHC 31.0 (L) 32.0 - 36.0 g/dL    RDW 15.1 (H) 11.5 - 14.5 %    Platelets 250 150 - 450 x10*3/uL    Neutrophils % 78.6 40.0 - 80.0 %    Immature Granulocytes %, Automated 0.7 0.0 - 0.9 %    Lymphocytes % 10.3 13.0 - 44.0 %    Monocytes % 8.4 2.0 - 10.0 %    Eosinophils % 1.3 0.0 - 6.0 %    Basophils % 0.7 0.0 - 2.0 %    Neutrophils Absolute 8.06 (H) 1.60 - 5.50 x10*3/uL    Immature Granulocytes Absolute, Automated 0.07 0.00 - 0.50 x10*3/uL    Lymphocytes Absolute 1.05 0.80 - 3.00 x10*3/uL    Monocytes Absolute 0.86 (H) 0.05 - 0.80 x10*3/uL    Eosinophils Absolute 0.13 0.00 - 0.40 x10*3/uL    Basophils Absolute 0.07 0.00 - 0.10 x10*3/uL   Comprehensive metabolic panel   Result Value Ref Range    Glucose 94 74 - 99 mg/dL    Sodium 138 136 - 145 mmol/L    Potassium 4.2 3.5 - 5.3 mmol/L    Chloride 106 98 - 107 mmol/L    Bicarbonate 26 21 - 32 mmol/L    Anion Gap 10 10 - 20 mmol/L    Urea Nitrogen 29 (H) 6 - 23 mg/dL    Creatinine 1.01 0.50 - 1.05 mg/dL    eGFR 51 (L) >60 mL/min/1.73m*2    Calcium 8.7 8.6 - 10.3 mg/dL    Albumin 3.2 (L) 3.4 - 5.0 g/dL    Alkaline Phosphatase 86 33 - 136 U/L    Total Protein 6.0 (L) 6.4 - 8.2 g/dL    AST 14 9 - 39 U/L    Bilirubin, Total 0.6 0.0 - 1.2 mg/dL    ALT 7 7 - 45 U/L   Protime-INR   Result Value Ref Range    Protime 58.7 () 9.8 - 12.8 seconds    INR 5.1 () 0.9 - 1.1   Troponin I, High Sensitivity   Result Value Ref Range    Troponin I, High Sensitivity 5 0 - 13 ng/L   Prepare RBC: 2 Units   Result Value Ref Range    PRODUCT CODE C6132D62     Unit Number [id number]      Unit ABO A     Unit RH POS     XM INTEP COMP     Dispense Status TR     Blood Expiration Date December 29, 2023 23:59 EST     PRODUCT BLOOD TYPE 6200     UNIT VOLUME 350     PRODUCT CODE T7285Z57     Unit Number [id number]     Unit ABO A     Unit RH POS     XM INTEP COMP     Dispense Status TR     Blood Expiration Date December 25, 2023 23:59 EST     PRODUCT BLOOD TYPE 6200     UNIT VOLUME 350    Prepare Plasma: 3 Units   Result Value Ref Range    PRODUCT CODE Q3427M25     Unit Number [id number]     Unit ABO A     Unit RH POS     Dispense Status TR     Blood Expiration Date December 13, 2023 16:45 EST     PRODUCT BLOOD TYPE 6200     UNIT VOLUME 337    Type and screen   Result Value Ref Range    ABO TYPE A     Rh TYPE POS     ANTIBODY SCREEN NEG    Lavender Top   Result Value Ref Range    Extra Tube Hold for add-ons.    Urinalysis with Reflex Microscopic and Culture   Result Value Ref Range    Color, Urine Red (N) Straw, Yellow    Appearance, Urine Turbid (N) Clear    Specific Gravity, Urine 1.020 1.005 - 1.035    pH, Urine 6.5 5.0, 5.5, 6.0, 6.5, 7.0, 7.5, 8.0    Protein, Urine 100 (2+) (A) NEGATIVE, 10 (TRACE) mg/dL    Glucose, Urine NEGATIVE NEGATIVE mg/dL    Blood, Urine 1.0 (3+) (A) NEGATIVE    Ketones, Urine NEGATIVE NEGATIVE mg/dL    Bilirubin, Urine NEGATIVE NEGATIVE    Urobilinogen, Urine NORM NORM mg/dL    Nitrite, Urine NEGATIVE NEGATIVE    Leukocyte Esterase, Urine 75 Leonor/µL (A) NEGATIVE   Extra Urine Gray Tube   Result Value Ref Range    Extra Tube Hold for add-ons.    Microscopic Only, Urine   Result Value Ref Range    WBC, Urine >50 (A) 1-5, NONE /HPF    RBC, Urine >20 (A) NONE, 1-2, 3-5 /HPF   CBC   Result Value Ref Range    WBC 17.0 (H) 4.4 - 11.3 x10*3/uL    nRBC 0.0 0.0 - 0.0 /100 WBCs    RBC 3.94 (L) 4.00 - 5.20 x10*6/uL    Hemoglobin 11.8 (L) 12.0 - 16.0 g/dL    Hematocrit 37.6 36.0 - 46.0 %    MCV 95 80 - 100 fL    MCH 29.9 26.0 - 34.0 pg    MCHC 31.4 (L) 32.0 - 36.0 g/dL    RDW 14.4  11.5 - 14.5 %    Platelets 199 150 - 450 x10*3/uL         Assessment/Plan     Patient is a 89-year-old female on comfort care with a past medical history significant for hyperlipidemia, coronary artery disease, atrial fibrillation on anticoagulation who presented to the emergency room with spontaneous hematuria.  In the emergency room her INR was found to be supratherapeutic at 5.1.  Her CT scan demonstrates large blood clot in the bladder as well as in the collecting system.    1.  Gross hematuria likely secondary to supratherapeutic anticoagulation  I had an extensive discussion with the patient this morning about exchanging the catheter for continuous bladder irrigation.  Patient does not want this as she had it poor experience with the previous exchange.  Patient is on comfort care.  As a result we agreed that we would irrigate the catheter every 4 hours to evacuate as much clot as possible.  In the interim the primary team is continuing to monitor her CBC.  She has undergone multiple transfusions of PRBCs and plasma.  Her anticoagulation is being held.  Based on patient preferences and the etiology of the hematuria, it is reasonable to continue to manage in this way for now.  Urology will continue to follow.    I spent 55 minutes in the professional and overall care of this patient.      Anthony Owen MD

## 2023-12-09 NOTE — CARE PLAN
The patient's goals for the shift include MAINTAIN HEMOGLOBIN ABOVE 8.0    The clinical goals for the shift include  REMAIN HEMODYNAMICALLY STABLE    PATIENTS FITZGERALD CATH IS DRAINING BRIGHT REG BLOOD FROM BLADDER. PRBC'S GIVEN IN ER. PATIENT NPO AT MIDNIGHT FOR UROLOGY CONSULT

## 2023-12-10 ENCOUNTER — DOCUMENTATION (OUTPATIENT)
Dept: UROLOGY | Facility: HOSPITAL | Age: 88
End: 2023-12-10
Payer: MEDICARE

## 2023-12-10 LAB
ANION GAP SERPL CALC-SCNC: 11 MMOL/L (ref 10–20)
BACTERIA UR CULT: NO GROWTH
BUN SERPL-MCNC: 45 MG/DL (ref 6–23)
CALCIUM SERPL-MCNC: 7.7 MG/DL (ref 8.6–10.3)
CHLORIDE SERPL-SCNC: 109 MMOL/L (ref 98–107)
CO2 SERPL-SCNC: 22 MMOL/L (ref 21–32)
CREAT SERPL-MCNC: 2.79 MG/DL (ref 0.5–1.05)
ERYTHROCYTE [DISTWIDTH] IN BLOOD BY AUTOMATED COUNT: 15.5 % (ref 11.5–14.5)
GFR SERPL CREATININE-BSD FRML MDRD: 15 ML/MIN/1.73M*2
GLUCOSE SERPL-MCNC: 100 MG/DL (ref 74–99)
HCT VFR BLD AUTO: 29.9 % (ref 36–46)
HGB BLD-MCNC: 9 G/DL (ref 12–16)
INR PPP: 1.3 (ref 0.9–1.1)
MCH RBC QN AUTO: 30 PG (ref 26–34)
MCHC RBC AUTO-ENTMCNC: 30.1 G/DL (ref 32–36)
MCV RBC AUTO: 100 FL (ref 80–100)
NRBC BLD-RTO: 0 /100 WBCS (ref 0–0)
PLATELET # BLD AUTO: 137 X10*3/UL (ref 150–450)
POTASSIUM SERPL-SCNC: 4.8 MMOL/L (ref 3.5–5.3)
PROTHROMBIN TIME: 14.6 SECONDS (ref 9.8–12.8)
RBC # BLD AUTO: 3 X10*6/UL (ref 4–5.2)
SODIUM SERPL-SCNC: 137 MMOL/L (ref 136–145)
WBC # BLD AUTO: 14.5 X10*3/UL (ref 4.4–11.3)

## 2023-12-10 PROCEDURE — 85610 PROTHROMBIN TIME: CPT | Performed by: NURSE PRACTITIONER

## 2023-12-10 PROCEDURE — 85027 COMPLETE CBC AUTOMATED: CPT | Performed by: NURSE PRACTITIONER

## 2023-12-10 PROCEDURE — 36415 COLL VENOUS BLD VENIPUNCTURE: CPT | Performed by: NURSE PRACTITIONER

## 2023-12-10 PROCEDURE — 2500000005 HC RX 250 GENERAL PHARMACY W/O HCPCS: Performed by: NURSE PRACTITIONER

## 2023-12-10 PROCEDURE — 2500000001 HC RX 250 WO HCPCS SELF ADMINISTERED DRUGS (ALT 637 FOR MEDICARE OP): Performed by: NURSE PRACTITIONER

## 2023-12-10 PROCEDURE — 80048 BASIC METABOLIC PNL TOTAL CA: CPT | Performed by: NURSE PRACTITIONER

## 2023-12-10 PROCEDURE — 1100000001 HC PRIVATE ROOM DAILY

## 2023-12-10 PROCEDURE — 82310 ASSAY OF CALCIUM: CPT | Performed by: NURSE PRACTITIONER

## 2023-12-10 RX ADMIN — DULOXETINE HYDROCHLORIDE 20 MG: 20 CAPSULE, DELAYED RELEASE ORAL at 20:53

## 2023-12-10 RX ADMIN — FERROUS SULFATE TAB 325 MG (65 MG ELEMENTAL FE) 1 TABLET: 325 (65 FE) TAB at 10:34

## 2023-12-10 RX ADMIN — GABAPENTIN 600 MG: 300 CAPSULE ORAL at 20:54

## 2023-12-10 RX ADMIN — LIDOCAINE 1 PATCH: 4 PATCH TOPICAL at 10:30

## 2023-12-10 RX ADMIN — ATENOLOL 25 MG: 25 TABLET ORAL at 20:52

## 2023-12-10 RX ADMIN — ACETAMINOPHEN 325 MG: 325 TABLET ORAL at 10:30

## 2023-12-10 RX ADMIN — GABAPENTIN 300 MG: 300 CAPSULE ORAL at 10:30

## 2023-12-10 ASSESSMENT — COGNITIVE AND FUNCTIONAL STATUS - GENERAL
HELP NEEDED FOR BATHING: A LITTLE
MOVING FROM LYING ON BACK TO SITTING ON SIDE OF FLAT BED WITH BEDRAILS: A LOT
DRESSING REGULAR LOWER BODY CLOTHING: A LITTLE
TOILETING: A LITTLE
WALKING IN HOSPITAL ROOM: A LOT
STANDING UP FROM CHAIR USING ARMS: A LOT
PERSONAL GROOMING: A LITTLE
DAILY ACTIVITIY SCORE: 18
MOBILITY SCORE: 12
TURNING FROM BACK TO SIDE WHILE IN FLAT BAD: A LOT
DRESSING REGULAR UPPER BODY CLOTHING: A LITTLE
EATING MEALS: A LITTLE
MOVING TO AND FROM BED TO CHAIR: A LOT
CLIMB 3 TO 5 STEPS WITH RAILING: A LOT

## 2023-12-10 ASSESSMENT — ENCOUNTER SYMPTOMS: HEMATURIA: 1

## 2023-12-10 ASSESSMENT — PAIN - FUNCTIONAL ASSESSMENT: PAIN_FUNCTIONAL_ASSESSMENT: 0-10

## 2023-12-10 ASSESSMENT — PAIN SCALES - GENERAL: PAINLEVEL_OUTOF10: 0 - NO PAIN

## 2023-12-10 NOTE — CONSULTS
INPATIENT NEPHROLOGY CONSULT NOTE        CONSULT: Nephrology SERVICE    PATIENT NAME: Noemí Hinton    MRN: [id number]  DATE of SERVICE: December 10, 2023  TIME of SERVICE: 11:47 AM      REASON FOR CONSULT:  YAA  REQUESTING PHYSICIAN: Dr. Shankar  PRIMARY CARE PHYSICIAN: June Batista MD    HPI:  Ms. Hinton is a 89 y.o. pleasant female with past medical history significant for chronic kidney disease stage IIIa baseline serum creatinine 1 mg sitter eGFR 51 mL/min per 1.73 m² history of hypertension, hyperlipidemia, coronary artery disease, atrial fibrillation on anticoagulation with warfarin, history of falls, right breast mass who presented to [facility] December 8, 2023 for evaluation of gross hematuria.  Patient resides at [facility].  Seen and evaluated at bedside reports weakness, poor oral intake.  Baer catheter in place patient receiving three-way irrigation for hematuria.  Coumadin on hold    Labs demonstrated acute kidney injury with peak serum creatinine 3.8 mg deciliter BUN of 45 and GFR of 15 mL/min per 1.73 m².  Hyperkalemia potassium 5.6, mild metabolic acidosis bicarb 22 hyperchloremia with chloride of 109.  CBC with hemoglobin of 9  Leukocytosis with WBC of 17    CT scan with IV contrast demonstrated multiple renal cyst as well as large amount of hemorrhage along the entire bladder with a hematoma measuring 9.7 cm and clot within the bilateral L4 calyceal system and bilateral ureters with mild hydronephrosis.      ASSESSMENT AND PLAN:  YAA on CKD IIIa:  likely hemodynamically mediated in the setting of hematuria, bladder clot with bladder outlet obstruction, bilateral hydronephrosis, contrast exposure  --Component of obstructive uropathy  --Intraparenchymal hemorrhage in the setting of supratherapeutic INR  --Warfarin induced nephropathy  --Contrast exposure  --Hemodynamically mediated,  hypotension  Admission serum creatinine of 3.8 mg deciliter, EGFR of 15 ml per minute per 1.73 m².   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  Right breast malignancy: Worsening on CT  Atrial fibrillation on chronic anticoagulation     Plan:  Warfarin induced nephropathy with bilateral hydronephrosis due to bladder clot/hematoma, contrast-induced:  Baer catheter in place patient receiving irrigation.  Supratherapeutic INR warfarin on hold.    EGFR 15 ml per minute so far no uremic symptoms no urgent indication for renal placement therapy.  Patient CODE STATUS DNR comfort measures only, to continue conservative management.    From renal standpoint will strongly recommend against warfarin upon discharge.    Due to current irrigation it will be difficult to measure urine electrolytes accurately.    To check urine culture   Strict input and output guide fluid management  Medication reviewed renally dosed  To continue renal diet, 2 g sodium.  No urgent indication for renal placement therapy. Pt not interested in long term dialysis.  CODE STATUS DNR comfort care measures  We will continue to monitor closely with you, thank you!     I sincerely, thank you Dr. Shankar for this opportunity to participate in the care of your patient, I will follow from Nephrology point view!         PAST MEDICAL HISTORY:    Past Medical History:   Diagnosis Date    Anxiety 09/14/2023    Arthritis     Atrial fibrillation (CMS/HCC) 08/24/2023    Breast mass, right 2019    Coronary  artery disease due to lipid rich plaque 2023    GERD (gastroesophageal reflux disease) 2023    Hip fracture, left (CMS/HCC) 2023    History of femur fracture     Left    HLD (hyperlipidemia)     Incontinence of urine     Myocardial infarction (CMS/[facility])     Neuropathy 2023    Sciatica        PAST SURGICAL HISTORY:    Past Surgical History:   Procedure Laterality Date     SECTION, CLASSIC      x 2    CORONARY ANGIOPLASTY WITH STENT PLACEMENT      4 stents in  and 2 stents in     HIP FRACTURE SURGERY Left     ORIF FEMUR FRACTURE Left        FAMILY HISTORY:    Family History   Problem Relation Name Age of Onset    Coronary artery disease Mother      Heart failure Mother      Coronary artery disease Father      Heart attack Father         SOCIAL HISTORY:    Social History     Tobacco Use    Smoking status: Never    Smokeless tobacco: Never   Vaping Use    Vaping Use: Never used   Substance Use Topics    Alcohol use: Never    Drug use: Never       MEDICATIONS:  Prior to Admission Medications:  Medications Prior to Admission   Medication Sig Dispense Refill Last Dose    acetaminophen (Tylenol) 325 mg tablet Take 2 tablets (650 mg) by mouth every 4 hours if needed for mild pain (1 - 3) or fever (temp greater than 38.0 C).   Past Week    atenolol (Tenormin) 25 mg tablet Take 1 tablet (25 mg) by mouth once daily at bedtime.   2023    DULoxetine (Cymbalta) 20 mg DR capsule Take 1 capsule (20 mg) by mouth once daily at bedtime.   2023    ferrous sulfate 325 (65 Fe) MG EC tablet Take 65 mg by mouth once daily with a meal.   2023    gabapentin (Neurontin) 300 mg capsule Take 1 capsule (300 mg) by mouth once daily.   2023    gabapentin (Neurontin) 300 mg capsule Take 2 capsules (600 mg) by mouth once daily at bedtime.   2023    lidocaine (Lidoderm) 5 % patch APPLY TO LEFT OR RIGHT SHOULDER 1 PATCH TOPICALLY ONE TIME A DAY AS NEEDED (ON FOR 12 HOURS AND OFF FOR 12  HOURS) 30 patch 11 Past Week    PharbetoL 325 mg tablet 1 TABLET BY MOUTH ONE TIME A DAY 30 tablet 11 12/8/2023    UNABLE TO FIND Take 1 each by mouth once daily at bedtime. Leg cramps SL tablet   12/8/2023    warfarin (Coumadin) 3 mg tablet Take 1 tablet (3 mg) by mouth 1 (one) time per week. Wednesday evening   Past Week    warfarin (Coumadin) 5 mg tablet Take 1 tablet (5 mg) by mouth once daily. Sunday, Monday, Tuesday, Thursday, Friday and Saturday evening. Omit Wednesday 12/8/2023       INPATIENT MEDICATIONS:    Current Facility-Administered Medications:     acetaminophen (Tylenol) tablet 325 mg, 325 mg, oral, Daily, ARMEN Hoang, 325 mg at 12/10/23 1030    acetaminophen (Tylenol) tablet 650 mg, 650 mg, oral, q4h PRN, ARMEN Meek    atenolol (Tenormin) tablet 25 mg, 25 mg, oral, Nightly, KELSEA Meek-CNP, 25 mg at 12/09/23 2147    DULoxetine (Cymbalta) DR capsule 20 mg, 20 mg, oral, Nightly, ARMEN Meek, 20 mg at 12/09/23 2145    ferrous sulfate (325 mg ferrous sulfate) tablet 1 tablet, 65 mg of iron, oral, Daily with breakfast, ARMEN Hoang, 1 tablet at 12/10/23 1034    gabapentin (Neurontin) capsule 300 mg, 300 mg, oral, Daily, ARMEN Hoang, 300 mg at 12/10/23 1030    gabapentin (Neurontin) capsule 600 mg, 600 mg, oral, Nightly, KELSEA Meek-CNP, 600 mg at 12/09/23 2147    lidocaine 4 % patch 1 patch, 1 patch, transdermal, Daily, ARMEN Hoang, 1 patch at 12/10/23 1030    melatonin tablet 3 mg, 3 mg, oral, Nightly PRN, ARMEN Meek    morphine injection 1 mg, 1 mg, intravenous, q4h PRN, ARMEN Meek    morphine injection 2 mg, 2 mg, intravenous, q4h PRN, Veronica Medina, KELSEA-CNP    ondansetron ODT (Zofran-ODT) disintegrating tablet 4 mg, 4 mg, oral, q8h PRN **OR** ondansetron (Zofran) injection 4 mg, 4 mg, intravenous, q8h PRN, Veronica Medina,  APRN-CNP    ALLERGIES:  Allergies   Allergen Reactions    Codeine Hives    Hydrocodone Hives    Tramadol GI bleeding       COMPLETE REVIEW OF SYSTEMS:    A comprehensive 14 point review of systems was obtained.  Constitutional: Positive for weakness, fatigue  HENT: Negative for congestion and sore throat.    Eyes: Negative for pain and visual disturbance.  Respiratory: Positive for shortness of breath.    Cardiovascular: Negative for chest pain and palpitations.  Gastrointestinal: Positive for abdominal pain  Genitourinary: Positive for gross hematuria  Musculoskeletal: Positive for for back pain and myalgias.  Skin: negative for wound. Negative for color change and rash.  Neurological: Negative for weakness and headaches.  Hematological: Negative for adenopathy. Does not bruise/bleed easily.  Psychiatric/Behavioral: Negative for agitation and confusion.  All other reviewed and negative other than HPI.    PHYSICAL EXAM: Physical exam performed.  Patient Vitals for the past 24 hrs:   BP Temp Temp src Pulse Resp SpO2   12/10/23 0800 109/55 36.3 °C (97.3 °F) Temporal 69 18 97 %   12/09/23 2100 107/55 36.7 °C (98.1 °F) Temporal 67 18 93 %   12/09/23 1600 107/54 36.1 °C (97 °F) Temporal 67 18 96 %     Body mass index is 22.9 kg/m².    CONSTITUTIONAL:  Vital signs reviewed.  Relative hypotension  GENERAL: Frail elderly female, pale  SKIN: Gross hematuria with residual erythema and redness  Leakage around Baer catheter  HEAD/SINUSES: Atraumatic  EYES: PERRLA, + pallor  OROPHARYNX: Dry mucous membranes  NECK: No jugulovenous distention, No carotid bruits, Carotid pulse normal contour, Supple  LUNGS: Diminished air entry bilaterally  CARDIAC: distant S1 and S2; no rubs, murmurs, or gallops  ABDOMEN: Abdomen soft, non-tender, BS normal, suprapubic tenderness, Baer catheter in place  EXTREMITIES: Good capillary refill, no edema.  NEUROLOGIC: Awake, alert and oriented ×1-2, No focal deficit  HEMATOLOGIC/Lymphatic/Immunologic:  No abnormal bleeding, echymosis, inflammation. No cervical or supraclavicular lymphadenopathy.    Intake/Output last 3 shifts:  I/O last 3 completed shifts:  In: 2231.7 (35.8 mL/kg) [P.O.:120; I.V.:1411.7 (22.6 mL/kg); Blood:700]  Out: 300 (4.8 mL/kg) [Urine:300 (0.1 mL/kg/hr)]  Weight: 62.4 kg     Diagnostic tests reviewed for today's visit:    CBC, Coags, RNP, Mg, Phos   Results from last 7 days   Lab Units 12/10/23  0926   WBC AUTO x10*3/uL 14.5*   RBC AUTO x10*6/uL 3.00*   HEMOGLOBIN g/dL 9.0*   HEMATOCRIT % 29.9*     Results from last 7 days   Lab Units 12/10/23  0926 12/09/23  1522 12/08/23  1304   SODIUM mmol/L 137   < > 138   POTASSIUM mmol/L 4.8   < > 4.2   CHLORIDE mmol/L 109*   < > 106   CO2 mmol/L 22   < > 26   BUN mg/dL 45*   < > 29*   CREATININE mg/dL 2.79*   < > 1.01   CALCIUM mg/dL 7.7*   < > 8.7   BILIRUBIN TOTAL mg/dL  --   --  0.6   ALT U/L  --   --  7   AST U/L  --   --  14    < > = values in this interval not displayed.     Results from last 7 days   Lab Units 12/08/23  1928   COLOR U  Red*   APPEARANCE U  Turbid*   PH U  6.5   SPEC GRAV UR  1.020   PROTEIN U mg/dL 100 (2+)*   BLOOD UR  1.0 (3+)*   NITRITE U  NEGATIVE   WBC UR /HPF >50*       CT  IMPRESSION:  1. Massive amount of hemorrhage filling the entire urinary bladder in  which there is a hematoma measuring 9.7 x 9.1 x 8.6 cm within the  urinary bladder lumen. Additional hemorrhage seen in the bilateral  renal pelvocaliceal systems and ureters with mild bilateral  hydronephrosis. Baer catheter terminates in the urinary bladder.  Urology consultation recommended      2. Significant interval enlargement of locally invasive enhancing  right breast malignancies. There is a subareolar component measuring  5.4 cm x 5.4 cm (previously 3.5 cm x 2.6 cm). Just superior to this  is a larger more infiltrative component measuring 8.1 cm x 4.3 cm  (previously 5.4 cm x 2.3 cm). No evidence of metastatic disease in  the abdomen or pelvis.      3.  "Subacute-chronic bilateral sacral insufficiency fractures are new  from 04/20/2021 most likely occurred at time of the left  intertrochanteric femur fracture (08/16/2023), and which is  healed/near completely healed.      4. Severe colonic diverticulosis and additional chronic nonacute  findings as above. .      5. Small right inguinal hernia containing short segment of nondilated  small bowel and the tip of the normal appendix.    IMAGING: CXR reviewed in  images.      SIGNATURE: Yocasta Titus MD  Nephrology and Hypertension  [address] [place]  Office phone: [phone]  FAX: [phone]      This note was partially created using voice recognition software and is inherently subject to errors including those of syntax and \"sound-alike\" substitutions which may escape proofreading.  In such instances, original meaning may be extrapolated by contextual derivation.   "

## 2023-12-10 NOTE — PROGRESS NOTES
Subjective   Patient ID: Noemí Hinton is a 89 y.o. female.    Patient seen on morning rounds  Appears to be less oriented today  Catheter irrigated at bedside with 2 L of saline with copious clots  Nurses at bedside  Instructed nurse on proper irrigation technique and plan to start CBI      Blood in Urine        Review of Systems   Genitourinary:  Positive for hematuria.   All other systems reviewed and are negative.      Objective   Physical Exam  Genitourinary:     Comments: 2 L of saline utilized for irrigation with return of copious clots      Results for orders placed or performed during the hospital encounter of 12/08/23 (from the past 24 hour(s))   Basic metabolic panel   Result Value Ref Range    Glucose 95 74 - 99 mg/dL    Sodium 138 136 - 145 mmol/L    Potassium 5.6 (H) 3.5 - 5.3 mmol/L    Chloride 108 (H) 98 - 107 mmol/L    Bicarbonate 25 21 - 32 mmol/L    Anion Gap 11 10 - 20 mmol/L    Urea Nitrogen 41 (H) 6 - 23 mg/dL    Creatinine 2.33 (H) 0.50 - 1.05 mg/dL    eGFR 19 (L) >60 mL/min/1.73m*2    Calcium 7.5 (L) 8.6 - 10.3 mg/dL   Protime-INR   Result Value Ref Range    Protime 15.1 (H) 9.8 - 12.8 seconds    INR 1.3 (H) 0.9 - 1.1   CBC   Result Value Ref Range    WBC 15.1 (H) 4.4 - 11.3 x10*3/uL    nRBC 0.0 0.0 - 0.0 /100 WBCs    RBC 3.20 (L) 4.00 - 5.20 x10*6/uL    Hemoglobin 9.6 (L) 12.0 - 16.0 g/dL    Hematocrit 30.8 (L) 36.0 - 46.0 %    MCV 96 80 - 100 fL    MCH 30.0 26.0 - 34.0 pg    MCHC 31.2 (L) 32.0 - 36.0 g/dL    RDW 15.3 (H) 11.5 - 14.5 %    Platelets 143 (L) 150 - 450 x10*3/uL         Assessment/Plan   There are no diagnoses linked to this encounter.    Patient is a 89-year-old female on comfort care with a past medical history significant for hyperlipidemia, coronary artery disease, atrial fibrillation on anticoagulation who presented to the emergency room with spontaneous hematuria.  In the emergency room her INR was found to be supratherapeutic at 5.1.  Her CT scan demonstrates large blood  clot in the bladder as well as in the collecting system.  Her most recent INR is within normal limits.  On a.m. rounds she was found to have significant amount of clot burden that was irrigated with 2 L of saline.     1.  I had an extensive discussion with the nurse at bedside that we need to be irrigating the catheter every 4 hours.  In addition we will plan to start CBI.  22 Iranian catheter inserted and CBI started.  Urology will continue to follow.

## 2023-12-10 NOTE — CARE PLAN
The patient's goals for the shift include PATIENT WILL REMAIN SAFE FROM FALLS AND INJURIES DURING THIS SHIFT    The clinical goals for the shift include pain control    Problem: Pain  Goal: My pain/discomfort is manageable  Outcome: Progressing     Problem: Daily Care  Goal: Daily care needs are met  Outcome: Progressing     Problem: Psychosocial Needs  Goal: Demonstrates ability to cope with hospitalization/illness  Outcome: Progressing  Goal: Collaborate with me, my family, and caregiver to identify my specific goals  Outcome: Progressing     Problem: Skin  Goal: Decreased wound size/increased tissue granulation at next dressing change  Outcome: Progressing  Goal: Participates in plan/prevention/treatment measures  Outcome: Progressing  Goal: Prevent/manage excess moisture  Outcome: Progressing  Goal: Prevent/minimize sheer/friction injuries  Outcome: Progressing  Goal: Promote/optimize nutrition  Outcome: Progressing  Goal: Promote skin healing  Outcome: Progressing

## 2023-12-10 NOTE — PROGRESS NOTES
MRN: [id number]  SERVICE DATE:  12/10/2023   SERVICE TIME:  10:02 AM  Today's Date: 12/10/23  Admission Date: 12/8/2023  Hospital Day: #2    Subjective   Patient seen today mentation unchanged seen by urology developing worsening kidney function           Objective      ROS:  Unobtainable due to mentation     MEDICATIONS:  Current Facility-Administered Medications   Medication Dose Route Frequency Provider Last Rate Last Admin    acetaminophen (Tylenol) tablet 325 mg  325 mg oral Daily Naa SHAUNA Bartos, APRN-CNP   325 mg at 12/09/23 1512    acetaminophen (Tylenol) tablet 650 mg  650 mg oral q4h PRN ARMEN Meek        atenolol (Tenormin) tablet 25 mg  25 mg oral Nightly ARMEN Meek   25 mg at 12/09/23 2147    DULoxetine (Cymbalta) DR capsule 20 mg  20 mg oral Nightly ARMEN Meek   20 mg at 12/09/23 2145    ferrous sulfate (325 mg ferrous sulfate) tablet 1 tablet  65 mg of iron oral Daily with breakfast ARMEN Hoang        gabapentin (Neurontin) capsule 300 mg  300 mg oral Daily ARMEN Hoang   300 mg at 12/09/23 1513    gabapentin (Neurontin) capsule 600 mg  600 mg oral Nightly ARMEN Meek   600 mg at 12/09/23 2147    lidocaine 4 % patch 1 patch  1 patch transdermal Daily ARMEN Hoang   1 patch at 12/09/23 1513    melatonin tablet 3 mg  3 mg oral Nightly PRN ARMEN Meek        morphine injection 1 mg  1 mg intravenous q4h PRN ARMEN Meek        morphine injection 2 mg  2 mg intravenous q4h PRN ARMEN Meek        ondansetron ODT (Zofran-ODT) disintegrating tablet 4 mg  4 mg oral q8h PRN ARMEN Meek        Or    ondansetron (Zofran) injection 4 mg  4 mg intravenous q8h PRN ARMEN Meek             PHYSICAL EXAM:   /55 (BP Location: Left arm, Patient Position: Lying)   Pulse 69   Temp 36.3 °C (97.3 °F)  "(Temporal)   Resp 18   Ht 1.651 m (5' 5\")   Wt 62.4 kg (137 lb 9.6 oz)   SpO2 97%   BMI 22.90 kg/m²    CONSTITUTIONAL - well nourished, well developed, looks like stated age, in no acute distress, not ill-appearing   SKIN - normal skin color and pigmentation, normal skin turgor without rash   HEAD - no trauma, normocephalic  EYES - pupils are equal and reactive to light, extraocular muscles are intact, and normal external exam  ENT - TM's intact, no injection, no signs of infection, uvula midline, normal tongue movement and throat normal  NECK - supple without rigidity, no neck mass was observed, no thyromegaly    CHEST - clear to auscultation, no wheezing, no crackles and no rales, good effort  CARDIAC - regular rate and regular rhythm, no skipped beats, no murmur  ABDOMEN - no organomegaly, soft, nontender, nondistended, normal bowel sounds   NEUROLOGICAL -unable to assess muscular neurological status       Labs:  Lab Results   Component Value Date    WBC 15.1 (H) 12/09/2023    HGB 9.6 (L) 12/09/2023    HCT 30.8 (L) 12/09/2023    MCV 96 12/09/2023     (L) 12/09/2023     Lab Results   Component Value Date    GLUCOSE 95 12/09/2023    CALCIUM 7.5 (L) 12/09/2023     12/09/2023    K 5.6 (H) 12/09/2023    CO2 25 12/09/2023     (H) 12/09/2023    BUN 41 (H) 12/09/2023    CREATININE 2.33 (H) 12/09/2023   ESR: --No results found for: \"SEDRATE\"No results found for: \"CRP\"  Lab Results   Component Value Date    ALT 7 12/08/2023    AST 14 12/08/2023    ALKPHOS 86 12/08/2023    BILITOT 0.6 12/08/2023                      Problem List Items Addressed This Visit             ICD-10-CM    Supratherapeutic INR R79.1    * (Principal) Hematuria R31.9     Other Visit Diagnoses         Codes    Vaginal bleeding    -  Primary N93.9        Urinary frequency  Supratherapeutic INR  Hematuria  YAA    Continue current meds  Monitor H&H  Nephrology consult pending  Recheck potassium level to make sure it is not " hemolyzed                 Ciro Garcia MD  This note was created using ethority. Any inadvertent grammar, spelling or syntax errors are do to voice recognition software error and are not intentional.

## 2023-12-11 LAB
ANION GAP SERPL CALC-SCNC: 10 MMOL/L (ref 10–20)
BUN SERPL-MCNC: 41 MG/DL (ref 6–23)
CALCIUM SERPL-MCNC: 8.3 MG/DL (ref 8.6–10.3)
CHLORIDE SERPL-SCNC: 108 MMOL/L (ref 98–107)
CO2 SERPL-SCNC: 24 MMOL/L (ref 21–32)
CREAT SERPL-MCNC: 1.41 MG/DL (ref 0.5–1.05)
ERYTHROCYTE [DISTWIDTH] IN BLOOD BY AUTOMATED COUNT: 15.4 % (ref 11.5–14.5)
GFR SERPL CREATININE-BSD FRML MDRD: 34 ML/MIN/1.73M*2
GLUCOSE SERPL-MCNC: 84 MG/DL (ref 74–99)
HCT VFR BLD AUTO: 34.9 % (ref 36–46)
HGB BLD-MCNC: 10.2 G/DL (ref 12–16)
INR PPP: 1.2 (ref 0.9–1.1)
MCH RBC QN AUTO: 29.9 PG (ref 26–34)
MCHC RBC AUTO-ENTMCNC: 29.2 G/DL (ref 32–36)
MCV RBC AUTO: 102 FL (ref 80–100)
NRBC BLD-RTO: 0 /100 WBCS (ref 0–0)
PLATELET # BLD AUTO: 153 X10*3/UL (ref 150–450)
POTASSIUM SERPL-SCNC: 4.4 MMOL/L (ref 3.5–5.3)
PROTHROMBIN TIME: 13.3 SECONDS (ref 9.8–12.8)
RBC # BLD AUTO: 3.41 X10*6/UL (ref 4–5.2)
SODIUM SERPL-SCNC: 138 MMOL/L (ref 136–145)
WBC # BLD AUTO: 12 X10*3/UL (ref 4.4–11.3)

## 2023-12-11 PROCEDURE — 97161 PT EVAL LOW COMPLEX 20 MIN: CPT | Mod: GP

## 2023-12-11 PROCEDURE — 85027 COMPLETE CBC AUTOMATED: CPT | Performed by: NURSE PRACTITIONER

## 2023-12-11 PROCEDURE — 97112 NEUROMUSCULAR REEDUCATION: CPT | Mod: GO

## 2023-12-11 PROCEDURE — 99231 SBSQ HOSP IP/OBS SF/LOW 25: CPT | Performed by: UROLOGY

## 2023-12-11 PROCEDURE — 80048 BASIC METABOLIC PNL TOTAL CA: CPT | Performed by: NURSE PRACTITIONER

## 2023-12-11 PROCEDURE — 2500000005 HC RX 250 GENERAL PHARMACY W/O HCPCS: Performed by: NURSE PRACTITIONER

## 2023-12-11 PROCEDURE — 36415 COLL VENOUS BLD VENIPUNCTURE: CPT | Performed by: NURSE PRACTITIONER

## 2023-12-11 PROCEDURE — 97165 OT EVAL LOW COMPLEX 30 MIN: CPT | Mod: GO

## 2023-12-11 PROCEDURE — 1100000001 HC PRIVATE ROOM DAILY

## 2023-12-11 PROCEDURE — 2500000001 HC RX 250 WO HCPCS SELF ADMINISTERED DRUGS (ALT 637 FOR MEDICARE OP): Performed by: NURSE PRACTITIONER

## 2023-12-11 PROCEDURE — 85610 PROTHROMBIN TIME: CPT | Performed by: NURSE PRACTITIONER

## 2023-12-11 PROCEDURE — 97530 THERAPEUTIC ACTIVITIES: CPT | Mod: GP

## 2023-12-11 RX ADMIN — GABAPENTIN 300 MG: 300 CAPSULE ORAL at 09:06

## 2023-12-11 RX ADMIN — Medication 3 MG: at 21:56

## 2023-12-11 RX ADMIN — GABAPENTIN 600 MG: 300 CAPSULE ORAL at 21:56

## 2023-12-11 RX ADMIN — ATENOLOL 25 MG: 25 TABLET ORAL at 21:56

## 2023-12-11 RX ADMIN — FERROUS SULFATE TAB 325 MG (65 MG ELEMENTAL FE) 1 TABLET: 325 (65 FE) TAB at 09:06

## 2023-12-11 RX ADMIN — LIDOCAINE 1 PATCH: 4 PATCH TOPICAL at 09:06

## 2023-12-11 RX ADMIN — ACETAMINOPHEN 650 MG: 325 TABLET ORAL at 15:35

## 2023-12-11 RX ADMIN — DULOXETINE HYDROCHLORIDE 20 MG: 20 CAPSULE, DELAYED RELEASE ORAL at 21:56

## 2023-12-11 RX ADMIN — ACETAMINOPHEN 325 MG: 325 TABLET ORAL at 09:06

## 2023-12-11 ASSESSMENT — PAIN SCALES - GENERAL
PAINLEVEL_OUTOF10: 0 - NO PAIN
PAINLEVEL_OUTOF10: 0 - NO PAIN
PAINLEVEL_OUTOF10: 3
PAINLEVEL_OUTOF10: 0 - NO PAIN

## 2023-12-11 ASSESSMENT — COGNITIVE AND FUNCTIONAL STATUS - GENERAL
DAILY ACTIVITIY SCORE: 15
DRESSING REGULAR LOWER BODY CLOTHING: A LOT
TOILETING: A LITTLE
PERSONAL GROOMING: A LITTLE
DRESSING REGULAR LOWER BODY CLOTHING: A LOT
TURNING FROM BACK TO SIDE WHILE IN FLAT BAD: A LOT
HELP NEEDED FOR BATHING: A LITTLE
TURNING FROM BACK TO SIDE WHILE IN FLAT BAD: A LOT
WALKING IN HOSPITAL ROOM: A LOT
TURNING FROM BACK TO SIDE WHILE IN FLAT BAD: A LITTLE
EATING MEALS: A LITTLE
MOBILITY SCORE: 13
MOVING TO AND FROM BED TO CHAIR: A LOT
PERSONAL GROOMING: A LITTLE
DRESSING REGULAR UPPER BODY CLOTHING: A LITTLE
WALKING IN HOSPITAL ROOM: A LOT
DRESSING REGULAR UPPER BODY CLOTHING: A LITTLE
CLIMB 3 TO 5 STEPS WITH RAILING: A LOT
HELP NEEDED FOR BATHING: A LOT
HELP NEEDED FOR BATHING: A LOT
MOVING FROM LYING ON BACK TO SITTING ON SIDE OF FLAT BED WITH BEDRAILS: A LOT
TOILETING: A LOT
STANDING UP FROM CHAIR USING ARMS: A LOT
MOBILITY SCORE: 13
STANDING UP FROM CHAIR USING ARMS: A LOT
TOILETING: A LOT
MOBILITY SCORE: 13
DAILY ACTIVITIY SCORE: 15
MOVING TO AND FROM BED TO CHAIR: A LITTLE
MOVING FROM LYING ON BACK TO SITTING ON SIDE OF FLAT BED WITH BEDRAILS: A LITTLE
MOVING FROM LYING ON BACK TO SITTING ON SIDE OF FLAT BED WITH BEDRAILS: A LOT
EATING MEALS: A LITTLE
DRESSING REGULAR LOWER BODY CLOTHING: A LOT
PERSONAL GROOMING: A LITTLE
EATING MEALS: A LITTLE
DAILY ACTIVITIY SCORE: 17
STANDING UP FROM CHAIR USING ARMS: A LOT
WALKING IN HOSPITAL ROOM: A LOT
CLIMB 3 TO 5 STEPS WITH RAILING: TOTAL
CLIMB 3 TO 5 STEPS WITH RAILING: A LOT
MOVING TO AND FROM BED TO CHAIR: A LITTLE
DRESSING REGULAR UPPER BODY CLOTHING: A LITTLE

## 2023-12-11 ASSESSMENT — PAIN DESCRIPTION - LOCATION: LOCATION: LEG

## 2023-12-11 ASSESSMENT — ACTIVITIES OF DAILY LIVING (ADL)
LACK_OF_TRANSPORTATION: NO
BATHING_ASSISTANCE: MODERATE

## 2023-12-11 ASSESSMENT — PAIN - FUNCTIONAL ASSESSMENT
PAIN_FUNCTIONAL_ASSESSMENT: 0-10

## 2023-12-11 ASSESSMENT — PAIN DESCRIPTION - ORIENTATION: ORIENTATION: RIGHT;LEFT

## 2023-12-11 NOTE — PROGRESS NOTES
INPATIENT NEPHROLOGY CONSULT PROGRESS NOTE      Patient Name: Noemí Hinton MRN: [id number]  DATE of SERVICE: December 11, 2023  TIME of SERVICE: 11:52 AM  CONSULTING SERVICE: Nephrology    REASON for CONSULT: YAA    SUBJECTIVE:  Seen and evaluated at bedside more awake and alert today.  Denies uremic symptoms.   Patient reports feeling hungry.  N.p.o. status discontinued as no surgical intervention planned.  Hematuria undergoing three-way Baer catheter irrigation.  Anticoagulation remains on hold  Serum creatinine improving down to 1.4 mg deciliter.    Summary of stay:  Ms. Hinton is a 89 y.o. pleasant female with past medical history significant for chronic kidney disease stage IIIa baseline serum creatinine 1 mg sitter eGFR 51 mL/min per 1.73 m² history of hypertension, hyperlipidemia, coronary artery disease, atrial fibrillation on anticoagulation with warfarin, history of falls, right breast mass who presented to [facility] December 8, 2023 for evaluation of gross hematuria.  Patient resides at [facility].  Seen and evaluated at bedside reports weakness, poor oral intake.  Baer catheter in place patient receiving three-way irrigation for hematuria.  Coumadin on hold. Labs demonstrated acute kidney injury with peak serum creatinine 3.8 mg deciliter BUN of 45 and GFR of 15 mL/min per 1.73 m².  Hyperkalemia potassium 5.6, mild metabolic acidosis bicarb 22 hyperchloremia with chloride of 109. CBC with hemoglobin of 9. Leukocytosis with WBC of 17..   CT scan with IV contrast demonstrated multiple renal cyst as well as large amount of hemorrhage along the entire bladder with a hematoma measuring 9.7 cm and clot within the bilateral L4 calyceal system and bilateral ureters with mild hydronephrosis.        ASSESSMENT AND PLAN:  YAA on CKD IIIa:  likely hemodynamically mediated in the setting of hematuria, bladder clot  with bladder outlet obstruction, bilateral hydronephrosis, contrast exposure  --Component of obstructive uropathy  --Intraparenchymal hemorrhage in the setting of supratherapeutic INR  --Warfarin induced nephropathy  --Contrast exposure  --Hemodynamically mediated, hypotension  Admission serum creatinine of 3.8 mg deciliter, EGFR of 15 ml per minute per 1.73 m².   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  Right breast malignancy: Worsening on CT  Atrial fibrillation on chronic anticoagulation     Plan:  Warfarin induced nephropathy with bilateral hydronephrosis due to bladder clot/hematoma, contrast-induced:    Serum creatinine gradually improving down to 1.4 mg/dL, no uremic symptoms no urgent indication for renal replacement therapy  To continue conservative management.  Baer catheter in place patient receiving irrigation.  Supratherapeutic INR warfarin on hold.     Due to the severity of bleeding, advanced age will benefit from discontinuing anticoagulation upon discharge.   Strict input and output guide fluid management  Medication reviewed renally dosed  To continue regular diet    We will continue to monitor closely with you, thank you!    Medications:    Current Facility-Administered Medications:     acetaminophen (Tylenol) tablet 325 mg, 325 mg, oral, Daily, KELSEA Hoang-CNP, 325 mg at 12/11/23 0906    acetaminophen (Tylenol) tablet 650 mg, 650 mg, oral, q4h PRN, KELSEA Meek-CNP    atenolol (Tenormin) tablet 25 mg, 25  mg, oral, Nightly, ARMEN Meek, 25 mg at 12/10/23 2052    DULoxetine (Cymbalta) DR capsule 20 mg, 20 mg, oral, Nightly, KELSEA Meek-CNP, 20 mg at 12/10/23 2053    ferrous sulfate (325 mg ferrous sulfate) tablet 1 tablet, 65 mg of iron, oral, Daily with breakfast, KELSEA Hoang-CNP, 1 tablet at 12/11/23 0906    gabapentin (Neurontin) capsule 300 mg, 300 mg, oral, Daily, KELSEA Hoang-CNP, 300 mg at 12/11/23 0906    gabapentin (Neurontin) capsule 600 mg, 600 mg, oral, Nightly, KELSEA Meek-CNP, 600 mg at 12/10/23 2054    lidocaine 4 % patch 1 patch, 1 patch, transdermal, Daily, ARMEN Hoang, 1 patch at 12/11/23 0906    melatonin tablet 3 mg, 3 mg, oral, Nightly PRN, ARMEN Meek    morphine injection 1 mg, 1 mg, intravenous, q4h PRN, KELSEA Meek-CNP    morphine injection 2 mg, 2 mg, intravenous, q4h PRN, ARMEN Meek    ondansetron ODT (Zofran-ODT) disintegrating tablet 4 mg, 4 mg, oral, q8h PRN **OR** ondansetron (Zofran) injection 4 mg, 4 mg, intravenous, q8h PRN, ARMEN Meek    PERTINENT ROS:  GENERAL:  positive for fatigue, poor appetite.  No fever/chills  RESPIRATORY:  positive for shortness of breath.  Negative for cough, wheezing.  CARDIOVASCULAR:   Negative for chest pain or palpitation.  GI:  Negative for abdominal pain, diarrhea, heartburn, nausea, vomiting  : Positive for hematuria, Baer catheter in place with three-way irrigation    Physical Exam:  Vital signs in last 24 hours:  Temp:  [35.8 °C (96.4 °F)-36 °C (96.8 °F)] 35.8 °C (96.4 °F)  Heart Rate:  [60-70] 60  Resp:  [17-18] 18  BP: (100-135)/(55-58) 135/58    General: Awake, frail chronically ill looking  HEENT:  NCAT,  mucous membranes moist and pink  NECK: No JVD, no carotid bruit, supple, no cervical mass or thyromegaly  LUNGS;  Diminished breath sounds, no Rales  CV:  Distant, regular rate and  rhythm, no murmurs  ABDOMEN:  abdomen soft, nontender, BS normal, no masses or organomegaly  EDEMA: No lower extremity edema/dependent edema  SKIN:  dry and normal turgor, no clubbing, cyanosis or petechia.  No rashes noted      Intake/Output last 3 shifts:  I/O last 3 completed shifts:  In: 1395 (22.4 mL/kg) [P.O.:140; I.V.:1255 (20.1 mL/kg)]  Out: - (0 mL/kg)   Weight: 62.4 kg     DATA:  Diagnotic tests reviewed for Todays visit:  Results from last 7 days   Lab Units 12/11/23  0943   WBC AUTO x10*3/uL 12.0*   RBC AUTO x10*6/uL 3.41*   HEMOGLOBIN g/dL 10.2*   HEMATOCRIT % 34.9*     Results from last 7 days   Lab Units 12/11/23  0943 12/09/23  1522 12/08/23  1304   SODIUM mmol/L 138   < > 138   POTASSIUM mmol/L 4.4   < > 4.2   CHLORIDE mmol/L 108*   < > 106   CO2 mmol/L 24   < > 26   BUN mg/dL 41*   < > 29*   CREATININE mg/dL 1.41*   < > 1.01   CALCIUM mg/dL 8.3*   < > 8.7   BILIRUBIN TOTAL mg/dL  --   --  0.6   ALT U/L  --   --  7   AST U/L  --   --  14    < > = values in this interval not displayed.     Results from last 7 days   Lab Units 12/08/23  1928   COLOR U  Red*   APPEARANCE U  Turbid*   PH U  6.5   SPEC GRAV UR  1.020   PROTEIN U mg/dL 100 (2+)*   BLOOD UR  1.0 (3+)*   NITRITE U  NEGATIVE   WBC UR /HPF >50*     IMAGING: CXR reviewed in  images      SIGNATURE: Yocasta Titus MD  Nephrology and Hypertension  [address] [facility] [place]  Office phone: [phone]  FAX: [phone]    This note was partially generated using the Dragon voice recognition system, and there may be some incorrect words, spelling's and punctuation that were not noted in checking the note before saving.

## 2023-12-11 NOTE — PROGRESS NOTES
Spiritual Care Visit         Denominational Encounters  Denominational Needs: Spiritual care brochure    Values/Beliefs  Cultural Requests During Hospitalization: [place] immigrated here in 1951            Patient grateful for pastoral care intervention.                       Taxonomy  Intended Effects: Build relationship of care and support, Convey a calming presence, Lessen someone's feelings of isolation  Methods: Offer spiritual/Church support  Interventions: Acknowledge current situation

## 2023-12-11 NOTE — CARE PLAN
Problem: Obstructive: Kidney Stones, Hydronephrosis, BPH  Goal: Return of normal voiding pattern  Outcome: Not Progressing  Note: 3way CBI     Problem: Pain  Goal: My pain/discomfort is manageable  12/11/2023 1626 by Ciara Horn RN  Outcome: Progressing  12/11/2023 1625 by Ciara Horn RN  Outcome: Progressing     Problem: Safety  Goal: Patient will be injury free during hospitalization  12/11/2023 1626 by Ciara Horn RN  Outcome: Progressing  12/11/2023 1625 by Ciara Horn RN  Outcome: Progressing  Goal: I will remain free of falls  Outcome: Progressing     Problem: Skin  Goal: Participates in plan/prevention/treatment measures  12/11/2023 1626 by Ciara Horn RN  Outcome: Progressing  Flowsheets (Taken 12/11/2023 1626)  Participates in plan/prevention/treatment measures:   Elevate heels   Increase activity/out of bed for meals   Discuss with provider PT/OT consult  12/11/2023 1116 by Ciara Horn RN  Outcome: Progressing     Problem: Acute Kidney Failure  Goal: Electrolytes/labs return to normal range  Outcome: Progressing  Goal: No signs of infection  Outcome: Progressing     Problem: Daily Care  Goal: Daily care needs are met  Outcome: Met

## 2023-12-11 NOTE — PROGRESS NOTES
MRN: [id number]  SERVICE DATE:  12/11/2023   SERVICE TIME:  9:27 AM  Today's Date: 12/11/23  Admission Date: 12/8/2023  Hospital Day: #3    Subjective   Patient seen today no significant change in mentation.  Admitted with warfarin induced nephropathy with bilateral hydronephrosis.           Objective      ROS:  Lethargic slow to answer questions     MEDICATIONS:  Current Facility-Administered Medications   Medication Dose Route Frequency Provider Last Rate Last Admin    acetaminophen (Tylenol) tablet 325 mg  325 mg oral Daily ARMEN Hoang   325 mg at 12/11/23 0906    acetaminophen (Tylenol) tablet 650 mg  650 mg oral q4h PRN ARMEN Meek        atenolol (Tenormin) tablet 25 mg  25 mg oral Nightly ARMEN Meek   25 mg at 12/10/23 2052    DULoxetine (Cymbalta) DR capsule 20 mg  20 mg oral Nightly ARMEN Meek   20 mg at 12/10/23 2053    ferrous sulfate (325 mg ferrous sulfate) tablet 1 tablet  65 mg of iron oral Daily with breakfast ARMEN Hoang   1 tablet at 12/11/23 0906    gabapentin (Neurontin) capsule 300 mg  300 mg oral Daily ARMEN Hoang   300 mg at 12/11/23 0906    gabapentin (Neurontin) capsule 600 mg  600 mg oral Nightly ARMEN Meek   600 mg at 12/10/23 2054    lidocaine 4 % patch 1 patch  1 patch transdermal Daily ARMEN Hoang   1 patch at 12/11/23 0906    melatonin tablet 3 mg  3 mg oral Nightly PRN ARMEN Meek        morphine injection 1 mg  1 mg intravenous q4h PRN ARMEN Meek        morphine injection 2 mg  2 mg intravenous q4h PRN ARMEN Meek        ondansetron ODT (Zofran-ODT) disintegrating tablet 4 mg  4 mg oral q8h PRN ARMEN Meek        Or    ondansetron (Zofran) injection 4 mg  4 mg intravenous q8h PRN Veronica N Carol, APRN-CNP             PHYSICAL EXAM:   /58 (BP Location: Left arm,  "Patient Position: Lying)   Pulse 60   Temp 35.8 °C (96.4 °F) (Temporal)   Resp 18   Ht 1.651 m (5' 5\")   Wt 62.4 kg (137 lb 9.6 oz)   SpO2 97%   BMI 22.90 kg/m²    CONSTITUTIONAL - well nourished, well developed, looks like stated age, in no acute distress, not ill-appearing   SKIN - normal skin color and pigmentation, normal skin turgor without rash   HEAD - no trauma, normocephalic  EYES - pupils are equal and reactive to light, extraocular muscles are intact, and normal external exam  ENT - TM's intact, no injection, no signs of infection, uvula midline, normal tongue movement and throat normal  NECK - supple without rigidity, no neck mass was observed, no thyromegaly    CHEST - clear to auscultation, no wheezing, no crackles and no rales, good effort  CARDIAC - regular rate and regular rhythm, no skipped beats, no murmur  ABDOMEN - no organomegaly, soft, nontender, nondistended, normal bowel sounds   NEUROLOGICAL - normal gait, normal balance, normal motor, no ataxia, DTRs equal and symmetrical; alert, oriented and no focal signs  PSYCHIATRIC - alert, pleasant and cordial, age-appropriate  IMMUNOLOGIC - no cervical lymphadenopathy       Labs:  Lab Results   Component Value Date    WBC 14.5 (H) 12/10/2023    HGB 9.0 (L) 12/10/2023    HCT 29.9 (L) 12/10/2023     12/10/2023     (L) 12/10/2023     Lab Results   Component Value Date    GLUCOSE 100 (H) 12/10/2023    CALCIUM 7.7 (L) 12/10/2023     12/10/2023    K 4.8 12/10/2023    CO2 22 12/10/2023     (H) 12/10/2023    BUN 45 (H) 12/10/2023    CREATININE 2.79 (H) 12/10/2023   ESR: --No results found for: \"SEDRATE\"No results found for: \"CRP\"  Lab Results   Component Value Date    ALT 7 12/08/2023    AST 14 12/08/2023    ALKPHOS 86 12/08/2023    BILITOT 0.6 12/08/2023                      Problem List Items Addressed This Visit             ICD-10-CM    Supratherapeutic INR R79.1    * (Principal) Hematuria R31.9     Other Visit Diagnoses  "        Codes    Vaginal bleeding    -  Primary N93.9    Adverse effect of warfarin     T45.515A    Hydronephrosis due to obstruction of bladder     N13.30, N32.0    Hyperkalemia     E87.5    Acidosis     E87.20    YAA (acute kidney injury) (CMS/HCC)     N17.9        Continue Baer  Monitor INR  Continue DNR CODE STATUS  Continue irrigation  Monitor kidney function                 Ciro Garcia MD  This note was created using [employer]. Any inadvertent grammar, spelling or syntax errors are do to voice recognition software error and are not intentional.

## 2023-12-11 NOTE — PROGRESS NOTES
12/11/23 1448   Discharge Planning   Living Arrangements Family members;Children   Support Systems Family members   Assistance Needed yes   Type of Residence Private residence   Do you have animals or pets at home? No   Home or Post Acute Services None   Patient expects to be discharged to: SNF   Does the patient need discharge transport arranged? Yes  (Wheelchair)   RoundTrip coordination needed? Yes   Has discharge transport been arranged? No   Financial Resource Strain   How hard is it for you to pay for the very basics like food, housing, medical care, and heating? Not hard   Housing Stability   In the last 12 months, was there a time when you were not able to pay the mortgage or rent on time? N   In the last 12 months, was there a time when you did not have a steady place to sleep or slept in a shelter (including now)? N   Transportation Needs   In the past 12 months, has lack of transportation kept you from medical appointments or from getting medications? no   In the past 12 months, has lack of transportation kept you from meetings, work, or from getting things needed for daily living? No     Introduced myself to the patient at the bedside and she asked me to speak with her daughter, Hanna. I spoke with Hanna by phone and she verified patient was at [facility] with respite . Prior to that she fell and broke her hip and was at [facility] and then went to the [place] resp[facility] care.   She was living with her daughter, who works from home and is able to care for her. Her plan is for her mom to go to rehab/skilled and get stronger to return home with private pay services. I emailed a choice skilled list to her at [email or web address].

## 2023-12-11 NOTE — PROGRESS NOTES
Physical Therapy    Physical Therapy Evaluation    Patient Name: Noemí Hinton  MRN: [id number]  Today's Date: 12/11/2023   Time Calculation  Start Time: 1042  Stop Time: 1104  Time Calculation (min): 22 min  3111    Assessment/Plan   PT Assessment: Pt demonstrates decreased strength, decreased activity tolerance, and impaired balance/mobility.  Based on current level of function, pt would benefit from continued skilled therapy while in the hospital to ensure safety, decrease risk of falls, and regains strength/mobility back to baseline.  Once stable enough for discharge, pt would benefit from moderate intensity therapy.   PT Assessment Results: Decreased strength, Impaired balance, Decreased mobility, Decreased safety awareness, Decreased cognition  Rehab Prognosis: Good  Evaluation/Treatment Tolerance: Patient tolerated treatment well  Medical Staff Made Aware: Yes  End of Session Communication: Bedside nurse  End of Session Patient Position: Up in chair, Alarm on  IP OR SWING BED PT PLAN  Inpatient or Swing Bed: Inpatient  PT Plan  Treatment/Interventions: Bed mobility, Transfer training, Gait training, Balance training, Neuromuscular re-education, Strengthening, Therapeutic exercise, Therapeutic activity  PT Plan: Skilled PT  PT Frequency: 3 times per week  PT Discharge Recommendations: Moderate intensity level of continued care  Equipment Recommended upon Discharge: Wheeled walker  PT Recommended Transfer Status: Assist x1  PT - OK to Discharge: Yes - To next level of care when cleared by medical team    Subjective     Current Problem:  Patient Active Problem List   Diagnosis    Coronary artery disease due to lipid rich plaque    Primary hypertension    Atrial fibrillation (CMS/HCC)    GERD (gastroesophageal reflux disease)    Hip fracture, left (CMS/HCC)    Shoulder pain, left    Diarrhea    Urinary frequency    Anxiety    Neuropathy    Supratherapeutic INR    Difficulty in walking    Pressure injury of skin of  buttock    Hematuria    Dysuria    Hematoma of bladder wall    Lower abdominal pain    Other hydronephrosis       General Visit Information:  Per EMR: presenting with blood in her diaper.  This been ongoing since yesterday.  She is unsure whether or not it is coming from her urine or from her vagina.  This is around the floor.  She does note ongoing urinary frequency, using the restroom every 2 hours with smaller volumes.  She continues to eat and drink appropriately denies fever, chills chest pain, shortness of breath, nausea, vomiting, abdominal pelvic pain, diarrhea.     General: On arrival, pt supine in bed.  Pt in no apparent distress and agreeable to therapy.  Reason for Referral: impaired mobility, gait training  Referred By: Ciro Garcia  Co-Treatment: OT  Prior to Session Communication: Bedside nurse  Patient Position Received: Bed, 3 rail up, Alarm on    Home Living/PLOF:  Pt is a questionable historian.  Per EMR: pt is from [facility] for respit care.    Seems as though pt went to SNF after hip surgery s/p fall in August.  Unsure if pt went back home after SNF stay or transitioned to long-term/ILF.  PT states she uses FWW and requires assistance with bed mobility and ADLs.     Precautions:  Precautions  Medical Precautions: Fall precautions    Vital Signs:  Vital Signs  SpO2: 94 %  Objective     Pain:  Pain Assessment  Pain Assessment: 0-10  Pain Score: 0 - No pain    Cognition:  Cognition  Overall Cognitive Status: Impaired  Orientation Level: Disoriented to place, Disoriented to situation, Disoriented to time    General Assessments:      Activity Tolerance  Endurance: Tolerates less than 10 min exercise, no significant change in vital signs    Static Sitting Balance  Static Sitting-Comment/Number of Minutes: fair plus  Dynamic Sitting Balance  Dynamic Sitting-Comments: fair  Static Standing Balance  Static Standing-Comment/Number of Minutes: fair  Dynamic Standing Balance  Dynamic  Standing-Comments: fair minus    Functional Assessments:  Bed Mobility   Supine to sit: Mod A x2; pt able to move BLE towards EOB but required assistance getting trunk upright    Transfers  Sit to stand: Mod A x2; Vcs for hand placement   Stand to sit: Min A x2; Vcs for hand placement     Ambulation/Gait Training  Pt ambulated 4 ft from bed to chair with Min A x1-2 and FWW; Vcs for sequencing and guidance     Extremity/Trunk Assessments:  BLE strength: at least 3/5 when assessed functionally    Outcome Measures:  [facility] Basic Mobility  Turning from your back to your side while in a flat bed without using bedrails: A lot  Moving from lying on your back to sitting on the side of a flat bed without using bedrails: A lot  Moving to and from bed to chair (including a wheelchair): A little  Standing up from a chair using your arms (e.g. wheelchair or bedside chair): A lot  To walk in hospital room: A lot  Climbing 3-5 steps with railing: A lot  Basic Mobility - Total Score: 13    Goals:  Encounter Problems       Encounter Problems (Active)       PT Problem       Pt will be able to perform all bed mobility tasks with CGA.  (Progressing)       Start:  12/11/23    Expected End:  12/25/23            Pt will perform all transfers with CGA and FWW with proper safety mechanics.   (Progressing)       Start:  12/11/23    Expected End:  12/25/23            Pt will ambulate 30 ft with CGA using FWW for improved functional independence.  (Progressing)       Start:  12/11/23    Expected End:  12/25/23            Pt will demonstrate good stand balance for completion of therapeutic exercises and functional tasks.  (Progressing)       Start:  12/11/23    Expected End:  12/25/23                 Education Documentation  Home Exercise Program, taught by Karen Washington PT at 12/11/2023  2:10 PM.  Learner: Patient  Readiness: Acceptance  Method: Explanation  Response: Needs Reinforcement    Mobility Training, taught by Karen Washington PT  at 12/11/2023  2:10 PM.  Learner: Patient  Readiness: Acceptance  Method: Explanation  Response: Needs Reinforcement    Education Comments  No comments found.

## 2023-12-11 NOTE — PROGRESS NOTES
Noemí Hinton is a 89 y.o. female on day 3 of admission presenting with Hematuria.    Subjective   Patient resting comfortably in bed this afternoon.  Denies suprapubic fullness or pain.       Objective     Last Recorded Vitals  Vitals:    12/11/23 1043   BP:    Pulse:    Resp:    Temp:    SpO2: 94%       Intake/Output last 3 Shifts:  I/O last 3 completed shifts:  In: 1395 (22.4 mL/kg) [P.O.:140; I.V.:1255 (20.1 mL/kg)]  Out: - (0 mL/kg)   Weight: 62.4 kg     Exam:  General: in NAD, appears stated age  Head: normocephalic, atraumatic  Respiratory: normal effort, no use of accessory muscles  Cardiovascular: no edema noted  Skin: normal turgor, no rashes  Neurologic: grossly intact, oriented to person/place/time  Urine: Light pink on moderate CBI.    Relevant Results    Lab Results   Component Value Date    WBC 12.0 (H) 12/11/2023    HGB 10.2 (L) 12/11/2023    HCT 34.9 (L) 12/11/2023     (H) 12/11/2023     12/11/2023     Lab Results   Component Value Date    GLUCOSE 84 12/11/2023    CALCIUM 8.3 (L) 12/11/2023     12/11/2023    K 4.4 12/11/2023    CO2 24 12/11/2023     (H) 12/11/2023    BUN 41 (H) 12/11/2023    CREATININE 1.41 (H) 12/11/2023                      Assessment/Plan   Principal Problem:    Hematuria  Active Problems:    Urinary frequency    Supratherapeutic INR    Dysuria    Hematoma of bladder wall    Lower abdominal pain    Other hydronephrosis    Hemoglobin stable.  Hematuria improving slowly.  Would continue CBI.  Will attempt to wean some overnight and reassess in a.m.  No current plan for surgical intervention.      Regino Kelin MD

## 2023-12-11 NOTE — CARE PLAN
The patient's goals for the shift include PATIENT WILL REMAIN SAFE FROM FALLS AND INJURIES DURING THIS SHIFT    The clinical goals for the shift include pain control    No pain medication given this shift.

## 2023-12-12 LAB
ANION GAP SERPL CALC-SCNC: 10 MMOL/L (ref 10–20)
BUN SERPL-MCNC: 33 MG/DL (ref 6–23)
CALCIUM SERPL-MCNC: 8.2 MG/DL (ref 8.6–10.3)
CHLORIDE SERPL-SCNC: 109 MMOL/L (ref 98–107)
CO2 SERPL-SCNC: 22 MMOL/L (ref 21–32)
CREAT SERPL-MCNC: 1 MG/DL (ref 0.5–1.05)
ERYTHROCYTE [DISTWIDTH] IN BLOOD BY AUTOMATED COUNT: 15.1 % (ref 11.5–14.5)
GFR SERPL CREATININE-BSD FRML MDRD: 52 ML/MIN/1.73M*2
GLUCOSE SERPL-MCNC: 86 MG/DL (ref 74–99)
HCT VFR BLD AUTO: 36.7 % (ref 36–46)
HGB BLD-MCNC: 11 G/DL (ref 12–16)
HOLD SPECIMEN: NORMAL
INR PPP: 1.2 (ref 0.9–1.1)
MCH RBC QN AUTO: 29.9 PG (ref 26–34)
MCHC RBC AUTO-ENTMCNC: 30 G/DL (ref 32–36)
MCV RBC AUTO: 100 FL (ref 80–100)
NRBC BLD-RTO: 0 /100 WBCS (ref 0–0)
PLATELET # BLD AUTO: 166 X10*3/UL (ref 150–450)
POTASSIUM SERPL-SCNC: 4.3 MMOL/L (ref 3.5–5.3)
PROTHROMBIN TIME: 13.2 SECONDS (ref 9.8–12.8)
RBC # BLD AUTO: 3.68 X10*6/UL (ref 4–5.2)
SODIUM SERPL-SCNC: 137 MMOL/L (ref 136–145)
WBC # BLD AUTO: 8.1 X10*3/UL (ref 4.4–11.3)

## 2023-12-12 PROCEDURE — 94760 N-INVAS EAR/PLS OXIMETRY 1: CPT

## 2023-12-12 PROCEDURE — 2500000001 HC RX 250 WO HCPCS SELF ADMINISTERED DRUGS (ALT 637 FOR MEDICARE OP): Performed by: NURSE PRACTITIONER

## 2023-12-12 PROCEDURE — 1100000001 HC PRIVATE ROOM DAILY

## 2023-12-12 PROCEDURE — 80048 BASIC METABOLIC PNL TOTAL CA: CPT | Performed by: NURSE PRACTITIONER

## 2023-12-12 PROCEDURE — 36415 COLL VENOUS BLD VENIPUNCTURE: CPT | Performed by: NURSE PRACTITIONER

## 2023-12-12 PROCEDURE — 85027 COMPLETE CBC AUTOMATED: CPT | Performed by: NURSE PRACTITIONER

## 2023-12-12 PROCEDURE — 99231 SBSQ HOSP IP/OBS SF/LOW 25: CPT | Performed by: NURSE PRACTITIONER

## 2023-12-12 PROCEDURE — 2500000005 HC RX 250 GENERAL PHARMACY W/O HCPCS: Performed by: NURSE PRACTITIONER

## 2023-12-12 PROCEDURE — 85610 PROTHROMBIN TIME: CPT | Performed by: NURSE PRACTITIONER

## 2023-12-12 RX ORDER — ALPRAZOLAM 0.25 MG/1
0.12 TABLET ORAL ONCE
Status: COMPLETED | OUTPATIENT
Start: 2023-12-12 | End: 2023-12-12

## 2023-12-12 RX ORDER — NYSTATIN 100000 [USP'U]/G
1 POWDER TOPICAL 2 TIMES DAILY
Status: DISCONTINUED | OUTPATIENT
Start: 2023-12-12 | End: 2023-12-18 | Stop reason: HOSPADM

## 2023-12-12 RX ADMIN — ACETAMINOPHEN 325 MG: 325 TABLET ORAL at 09:34

## 2023-12-12 RX ADMIN — LIDOCAINE 1 PATCH: 4 PATCH TOPICAL at 09:34

## 2023-12-12 RX ADMIN — Medication 3 MG: at 18:30

## 2023-12-12 RX ADMIN — FERROUS SULFATE TAB 325 MG (65 MG ELEMENTAL FE) 1 TABLET: 325 (65 FE) TAB at 09:34

## 2023-12-12 RX ADMIN — GABAPENTIN 300 MG: 300 CAPSULE ORAL at 09:34

## 2023-12-12 RX ADMIN — ALPRAZOLAM 0.12 MG: 0.25 TABLET ORAL at 20:23

## 2023-12-12 RX ADMIN — DULOXETINE HYDROCHLORIDE 20 MG: 20 CAPSULE, DELAYED RELEASE ORAL at 20:24

## 2023-12-12 RX ADMIN — GABAPENTIN 600 MG: 300 CAPSULE ORAL at 20:24

## 2023-12-12 RX ADMIN — NYSTATIN 1 APPLICATION: 100000 POWDER TOPICAL at 20:24

## 2023-12-12 RX ADMIN — NYSTATIN 1 APPLICATION: 100000 POWDER TOPICAL at 16:55

## 2023-12-12 ASSESSMENT — COGNITIVE AND FUNCTIONAL STATUS - GENERAL
TOILETING: A LOT
DRESSING REGULAR UPPER BODY CLOTHING: A LITTLE
TURNING FROM BACK TO SIDE WHILE IN FLAT BAD: A LOT
MOBILITY SCORE: 12
DAILY ACTIVITIY SCORE: 15
PERSONAL GROOMING: A LITTLE
HELP NEEDED FOR BATHING: A LOT
EATING MEALS: A LITTLE
DRESSING REGULAR LOWER BODY CLOTHING: A LOT
STANDING UP FROM CHAIR USING ARMS: A LOT
CLIMB 3 TO 5 STEPS WITH RAILING: TOTAL
MOVING FROM LYING ON BACK TO SITTING ON SIDE OF FLAT BED WITH BEDRAILS: A LITTLE
MOVING TO AND FROM BED TO CHAIR: A LOT
WALKING IN HOSPITAL ROOM: A LOT

## 2023-12-12 ASSESSMENT — PAIN - FUNCTIONAL ASSESSMENT
PAIN_FUNCTIONAL_ASSESSMENT: 0-10
PAIN_FUNCTIONAL_ASSESSMENT: 0-10

## 2023-12-12 ASSESSMENT — PAIN DESCRIPTION - DESCRIPTORS: DESCRIPTORS: ACHING

## 2023-12-12 ASSESSMENT — PAIN SCALES - GENERAL
PAINLEVEL_OUTOF10: 0 - NO PAIN
PAINLEVEL_OUTOF10: 3
PAINLEVEL_OUTOF10: 0 - NO PAIN

## 2023-12-12 NOTE — PROGRESS NOTES
MRN: [id number]  SERVICE DATE:  12/12/2023   SERVICE TIME:  3:29 PM  Today's Date: 12/12/23  Admission Date: 12/8/2023  Hospital Day: #4    Subjective   Patient seen today resting comfortably Baer still with hematuria difficulty drawing labs           Objective      ROS:  Unchanged     MEDICATIONS:  Current Facility-Administered Medications   Medication Dose Route Frequency Provider Last Rate Last Admin    acetaminophen (Tylenol) tablet 325 mg  325 mg oral Daily Naa SHAUNA KELSEA Verdugo-CNP   325 mg at 12/12/23 0934    acetaminophen (Tylenol) tablet 650 mg  650 mg oral q4h PRN ARMEN Meek   650 mg at 12/11/23 1535    atenolol (Tenormin) tablet 25 mg  25 mg oral Nightly ARMEN Meek   25 mg at 12/11/23 2156    DULoxetine (Cymbalta) DR capsule 20 mg  20 mg oral Nightly KELSEA Meek-CNP   20 mg at 12/11/23 2156    ferrous sulfate (325 mg ferrous sulfate) tablet 1 tablet  65 mg of iron oral Daily with breakfast ARMEN Hoang   1 tablet at 12/12/23 0934    gabapentin (Neurontin) capsule 300 mg  300 mg oral Daily KELSEA Hoang-CNP   300 mg at 12/12/23 0934    gabapentin (Neurontin) capsule 600 mg  600 mg oral Nightly KELSEA Meek-CNP   600 mg at 12/11/23 2156    lidocaine 4 % patch 1 patch  1 patch transdermal Daily ARMEN Hoang   1 patch at 12/12/23 0934    melatonin tablet 3 mg  3 mg oral Nightly PRN KELSEA Meek-CNP   3 mg at 12/11/23 2156    morphine injection 1 mg  1 mg intravenous q4h PRN ARMEN Meek        morphine injection 2 mg  2 mg intravenous q4h PRN ARMEN Meek        nystatin (Mycostatin) 100,000 unit/gram powder 1 Application  1 Application Topical BID ARMEN Almodovar        ondansetron ODT (Zofran-ODT) disintegrating tablet 4 mg  4 mg oral q8h PRN ARMEN Meek        Or    ondansetron (Zofran) injection 4 mg  4 mg intravenous q8h  "FRANCHESKA Medina, APRN-CNP             PHYSICAL EXAM:   /62 (BP Location: Left arm, Patient Position: Lying)   Pulse 67   Temp 36 °C (96.8 °F) (Temporal)   Resp 19   Ht 1.651 m (5' 5\")   Wt 62.4 kg (137 lb 9.6 oz)   SpO2 90%   BMI 22.90 kg/m²    CONSTITUTIONAL - well nourished, well developed, looks like stated age, in no acute distress, not ill-appearing   SKIN - normal skin color and pigmentation, normal skin turgor without rash   HEAD - no trauma, normocephalic  EYES - pupils are equal and reactive to light, extraocular muscles are intact, and normal external exam  ENT - TM's intact, no injection, no signs of infection, uvula midline, normal tongue movement and throat normal  NECK - supple without rigidity, no neck mass was observed, no thyromegaly    CHEST - clear to auscultation, no wheezing, no crackles and no rales, good effort  CARDIAC - regular rate and regular rhythm, no skipped beats, no murmur  ABDOMEN - no organomegaly, soft, nontender, nondistended, normal bowel sounds   NEUROLOGICAL -lethargic slow to answer questions  Baer catheter in place       Labs:  Lab Results   Component Value Date    WBC 8.1 12/12/2023    HGB 11.0 (L) 12/12/2023    HCT 36.7 12/12/2023     12/12/2023     12/12/2023     Lab Results   Component Value Date    GLUCOSE 86 12/12/2023    CALCIUM 8.2 (L) 12/12/2023     12/12/2023    K 4.3 12/12/2023    CO2 22 12/12/2023     (H) 12/12/2023    BUN 33 (H) 12/12/2023    CREATININE 1.00 12/12/2023   ESR: --No results found for: \"SEDRATE\"No results found for: \"CRP\"  Lab Results   Component Value Date    ALT 7 12/08/2023    AST 14 12/08/2023    ALKPHOS 86 12/08/2023    BILITOT 0.6 12/08/2023                      Problem List Items Addressed This Visit             ICD-10-CM    Supratherapeutic INR R79.1    * (Principal) Hematuria R31.9     Other Visit Diagnoses         Codes    Vaginal bleeding    -  Primary N93.9    Adverse effect of warfarin    "  T45.515A    Hydronephrosis due to obstruction of bladder     N13.30, N32.0    Hyperkalemia     E87.5    Acidosis     E87.20    YAA (acute kidney injury) (CMS/HCC)     N17.9          Monitor H&H  Continue CBI  Recheck blood work in a.m.  Continue Coumadin hold              iCro Garcia MD  This note was created using [employer]. Any inadvertent grammar, spelling or syntax errors are do to voice recognition software error and are not intentional.

## 2023-12-12 NOTE — CARE PLAN
Problem: Obstructive: Kidney Stones, Hydronephrosis, BPH  Goal: Return of normal voiding pattern  12/12/2023 1727 by Ciara Horn RN  Outcome: Not Progressing  12/12/2023 1024 by Ciara Horn RN  Outcome: Progressing     Problem: Pain  Goal: My pain/discomfort is manageable  Outcome: Progressing     Problem: Skin  Goal: Participates in plan/prevention/treatment measures  Outcome: Progressing     Problem: Acute Kidney Failure  Goal: Electrolytes/labs return to normal range  12/12/2023 1727 by Ciara Horn RN  Outcome: Progressing  12/12/2023 1024 by Ciara Horn RN  Outcome: Progressing  Goal: No signs of infection  Outcome: Progressing

## 2023-12-12 NOTE — NURSING NOTE
Pt has multiple members at bedside, was just informed her  passed away 12/10. Emotional support provided. Daughter requested small dose anxiolytic to help pt with grieving process.

## 2023-12-12 NOTE — CARE PLAN
The patient's goals for the shift include PATIENT WILL REMAIN SAFE FROM FALLS AND INJURIES DURING THIS SHIFT    The clinical goals for the shift include Pt will have decrease in hematuria by end of this shift      Problem: Pain  Goal: My pain/discomfort is manageable  Outcome: Progressing     Problem: Safety  Goal: Patient will be injury free during hospitalization  Outcome: Progressing  Goal: I will remain free of falls  Outcome: Progressing     Problem: Psychosocial Needs  Goal: Demonstrates ability to cope with hospitalization/illness  Outcome: Progressing  Goal: Collaborate with me, my family, and caregiver to identify my specific goals  Outcome: Progressing     Problem: Discharge Barriers  Goal: My discharge needs are met  Outcome: Progressing     Problem: Skin  Goal: Decreased wound size/increased tissue granulation at next dressing change  Outcome: Progressing  Goal: Participates in plan/prevention/treatment measures  Outcome: Progressing  Goal: Prevent/manage excess moisture  Outcome: Progressing  Goal: Prevent/minimize sheer/friction injuries  Outcome: Progressing  Goal: Promote/optimize nutrition  Outcome: Progressing  Goal: Promote skin healing  Outcome: Progressing     Problem: Acute Kidney Failure  Goal: Electrolytes/labs return to normal range  Outcome: Progressing  Goal: No signs of infection  Outcome: Progressing  Goal: Stable weight and I&O  Outcome: Progressing     Problem: Obstructive: Kidney Stones, Hydronephrosis, BPH  Goal: Return of normal voiding pattern  Outcome: Progressing

## 2023-12-12 NOTE — PROGRESS NOTES
Noemí Hinton 89 y.o. female    Subjective  Patent currently getting up chair.  Denies suprapubic fullness or pain.  Per nursing, amos still clotting and needing manual irrigation.  CBI continue at slow drip rate.      Objective  PHYSICAL EXAM:  Physical Exam  Vitals reviewed.   Constitutional:       General: She is awake.      Appearance: Normal appearance.   Cardiovascular:      Rate and Rhythm: Normal rate and regular rhythm.      Pulses: Normal pulses.      Heart sounds: Normal heart sounds.   Pulmonary:      Effort: Pulmonary effort is normal.      Breath sounds: Normal breath sounds and air entry.   Abdominal:      General: Abdomen is flat. There is no distension.      Palpations: Abdomen is soft.      Tenderness: There is no abdominal tenderness. There is no guarding or rebound.   Genitourinary:     Comments: 3 way amos in place with CBI at slow drip rate, urine currently dark pink.  Musculoskeletal:         General: Normal range of motion.      Cervical back: Normal range of motion.   Skin:     General: Skin is warm and dry.   Neurological:      General: No focal deficit present.      Mental Status: She is alert and oriented to person, place, and time.   Psychiatric:         Behavior: Behavior is cooperative.         Vital signs in last 24 hours:  Vitals:    12/12/23 0800   BP: 144/62   Pulse: 67   Resp: 19   Temp: 36 °C (96.8 °F)   SpO2: 90%         Intake/Output this shift:    Intake/Output Summary (Last 24 hours) at 12/12/2023 1042  Last data filed at 12/12/2023 0400  Gross per 24 hour   Intake 320 ml   Output -6750 ml   Net 7070 ml        Allergies:  Allergies   Allergen Reactions    Codeine Hives    Hydrocodone Hives    Tramadol GI bleeding        Medications:  Scheduled medications  acetaminophen, 325 mg, oral, Daily  atenolol, 25 mg, oral, Nightly  DULoxetine, 20 mg, oral, Nightly  ferrous sulfate (325 mg ferrous sulfate), 65 mg of iron, oral, Daily with breakfast  gabapentin, 300 mg, oral,  Daily  gabapentin, 600 mg, oral, Nightly  lidocaine, 1 patch, transdermal, Daily      Continuous medications     PRN medications  PRN medications: acetaminophen, melatonin, morphine, morphine, ondansetron ODT **OR** ondansetron       Labs:  Results for orders placed or performed during the hospital encounter of 12/08/23 (from the past 24 hour(s))   Basic metabolic panel   Result Value Ref Range    Glucose 86 74 - 99 mg/dL    Sodium 137 136 - 145 mmol/L    Potassium 4.3 3.5 - 5.3 mmol/L    Chloride 109 (H) 98 - 107 mmol/L    Bicarbonate 22 21 - 32 mmol/L    Anion Gap 10 10 - 20 mmol/L    Urea Nitrogen 33 (H) 6 - 23 mg/dL    Creatinine 1.00 0.50 - 1.05 mg/dL    eGFR 52 (L) >60 mL/min/1.73m*2    Calcium 8.2 (L) 8.6 - 10.3 mg/dL   CBC   Result Value Ref Range    WBC 8.1 4.4 - 11.3 x10*3/uL    nRBC 0.0 0.0 - 0.0 /100 WBCs    RBC 3.68 (L) 4.00 - 5.20 x10*6/uL    Hemoglobin 11.0 (L) 12.0 - 16.0 g/dL    Hematocrit 36.7 36.0 - 46.0 %     80 - 100 fL    MCH 29.9 26.0 - 34.0 pg    MCHC 30.0 (L) 32.0 - 36.0 g/dL    RDW 15.1 (H) 11.5 - 14.5 %    Platelets 166 150 - 450 x10*3/uL   Protime-INR   Result Value Ref Range    Protime 13.2 (H) 9.8 - 12.8 seconds    INR 1.2 (H) 0.9 - 1.1        Imaging:  No results found.     Plan  Principal Problem:    Hematuria  Active Problems:    Urinary frequency    Supratherapeutic INR    Dysuria    Hematoma of bladder wall    Lower abdominal pain    Other hydronephrosis     - Hemoglobin stable.    - Hematuria improving slowly.    - Would continue CBI - rate at slow drip to maintain patency   - Will attempt to wean some overnight and reassess in a.m.    - No current plan for surgical intervention.  - Coumadin on hold     Plan of care discussed with Dr. Ernie Coleman, APRN-CNP    I spent 25 minutes in the professional and overall care of this patient.

## 2023-12-13 LAB
ANION GAP SERPL CALC-SCNC: 11 MMOL/L (ref 10–20)
ATRIAL RATE: 105 BPM
BUN SERPL-MCNC: 23 MG/DL (ref 6–23)
CALCIUM SERPL-MCNC: 8.1 MG/DL (ref 8.6–10.3)
CHLORIDE SERPL-SCNC: 108 MMOL/L (ref 98–107)
CO2 SERPL-SCNC: 24 MMOL/L (ref 21–32)
CREAT SERPL-MCNC: 0.78 MG/DL (ref 0.5–1.05)
ERYTHROCYTE [DISTWIDTH] IN BLOOD BY AUTOMATED COUNT: 15.3 % (ref 11.5–14.5)
GFR SERPL CREATININE-BSD FRML MDRD: 70 ML/MIN/1.73M*2
GLUCOSE SERPL-MCNC: 87 MG/DL (ref 74–99)
HCT VFR BLD AUTO: 29.7 % (ref 36–46)
HGB BLD-MCNC: 9.3 G/DL (ref 12–16)
HOLD SPECIMEN: NORMAL
INR PPP: 1.2 (ref 0.9–1.1)
MCH RBC QN AUTO: 30.1 PG (ref 26–34)
MCHC RBC AUTO-ENTMCNC: 31.3 G/DL (ref 32–36)
MCV RBC AUTO: 96 FL (ref 80–100)
NRBC BLD-RTO: 0 /100 WBCS (ref 0–0)
P AXIS: 60 DEGREES
P OFFSET: 194 MS
P ONSET: 145 MS
PLATELET # BLD AUTO: 188 X10*3/UL (ref 150–450)
POTASSIUM SERPL-SCNC: 3.7 MMOL/L (ref 3.5–5.3)
PR INTERVAL: 142 MS
PROTHROMBIN TIME: 13 SECONDS (ref 9.8–12.8)
Q ONSET: 216 MS
QRS COUNT: 17 BEATS
QRS DURATION: 78 MS
QT INTERVAL: 338 MS
QTC CALCULATION(BAZETT): 446 MS
QTC FREDERICIA: 407 MS
R AXIS: -6 DEGREES
RBC # BLD AUTO: 3.09 X10*6/UL (ref 4–5.2)
SODIUM SERPL-SCNC: 139 MMOL/L (ref 136–145)
T AXIS: 70 DEGREES
T OFFSET: 385 MS
VENTRICULAR RATE: 105 BPM
WBC # BLD AUTO: 5.4 X10*3/UL (ref 4.4–11.3)

## 2023-12-13 PROCEDURE — 80048 BASIC METABOLIC PNL TOTAL CA: CPT | Performed by: NURSE PRACTITIONER

## 2023-12-13 PROCEDURE — 85610 PROTHROMBIN TIME: CPT | Performed by: NURSE PRACTITIONER

## 2023-12-13 PROCEDURE — 36415 COLL VENOUS BLD VENIPUNCTURE: CPT | Performed by: NURSE PRACTITIONER

## 2023-12-13 PROCEDURE — 85027 COMPLETE CBC AUTOMATED: CPT | Performed by: NURSE PRACTITIONER

## 2023-12-13 PROCEDURE — 1100000001 HC PRIVATE ROOM DAILY

## 2023-12-13 PROCEDURE — 2500000001 HC RX 250 WO HCPCS SELF ADMINISTERED DRUGS (ALT 637 FOR MEDICARE OP): Performed by: NURSE PRACTITIONER

## 2023-12-13 PROCEDURE — 94760 N-INVAS EAR/PLS OXIMETRY 1: CPT

## 2023-12-13 PROCEDURE — 99231 SBSQ HOSP IP/OBS SF/LOW 25: CPT | Performed by: NURSE PRACTITIONER

## 2023-12-13 PROCEDURE — 2500000005 HC RX 250 GENERAL PHARMACY W/O HCPCS: Performed by: NURSE PRACTITIONER

## 2023-12-13 PROCEDURE — 2500000001 HC RX 250 WO HCPCS SELF ADMINISTERED DRUGS (ALT 637 FOR MEDICARE OP): Performed by: INTERNAL MEDICINE

## 2023-12-13 RX ORDER — GABAPENTIN 300 MG/1
600 CAPSULE ORAL 2 TIMES DAILY
Status: DISCONTINUED | OUTPATIENT
Start: 2023-12-13 | End: 2023-12-18

## 2023-12-13 RX ADMIN — NYSTATIN 1 APPLICATION: 100000 POWDER TOPICAL at 12:50

## 2023-12-13 RX ADMIN — NYSTATIN 1 APPLICATION: 100000 POWDER TOPICAL at 22:15

## 2023-12-13 RX ADMIN — DULOXETINE HYDROCHLORIDE 20 MG: 20 CAPSULE, DELAYED RELEASE ORAL at 21:58

## 2023-12-13 RX ADMIN — ATENOLOL 25 MG: 25 TABLET ORAL at 21:58

## 2023-12-13 RX ADMIN — FERROUS SULFATE TAB 325 MG (65 MG ELEMENTAL FE) 1 TABLET: 325 (65 FE) TAB at 10:51

## 2023-12-13 RX ADMIN — GABAPENTIN 600 MG: 300 CAPSULE ORAL at 10:51

## 2023-12-13 RX ADMIN — GABAPENTIN 600 MG: 300 CAPSULE ORAL at 21:58

## 2023-12-13 RX ADMIN — ACETAMINOPHEN 325 MG: 325 TABLET ORAL at 10:51

## 2023-12-13 RX ADMIN — LIDOCAINE 1 PATCH: 4 PATCH TOPICAL at 10:51

## 2023-12-13 ASSESSMENT — PAIN SCALES - GENERAL
PAINLEVEL_OUTOF10: 0 - NO PAIN
PAINLEVEL_OUTOF10: 0 - NO PAIN

## 2023-12-13 ASSESSMENT — PAIN - FUNCTIONAL ASSESSMENT: PAIN_FUNCTIONAL_ASSESSMENT: 0-10

## 2023-12-13 NOTE — PROGRESS NOTES
Noemí Hinton 89 y.o. female    Subjective  Patient seen and examined this morning.  Family at bedside.  CBI with yellow urine, currently clamped.  Denies suprapubic fullness.  No acute events overnight.     Objectiv  PHYSICAL EXAM:  Physical Exam  Vitals reviewed.   Constitutional:       General: She is awake.      Appearance: Normal appearance.   Cardiovascular:      Rate and Rhythm: Normal rate and regular rhythm.      Pulses: Normal pulses.      Heart sounds: Normal heart sounds.   Pulmonary:      Effort: Pulmonary effort is normal.      Breath sounds: Normal breath sounds and air entry.   Abdominal:      General: Abdomen is flat. There is no distension.      Palpations: Abdomen is soft.      Tenderness: There is no abdominal tenderness. There is no guarding or rebound.   Genitourinary:     Comments: 3 way amos in place with CBI clamped, urine yellow.   Musculoskeletal:         General: Normal range of motion.      Cervical back: Normal range of motion.   Skin:     General: Skin is warm and dry.   Neurological:      General: No focal deficit present.      Mental Status: She is alert and oriented to person, place, and time.   Psychiatric:         Behavior: Behavior is cooperative.         Vital signs in last 24 hours:  Vitals:    12/13/23 0800   BP: 147/63   Pulse: 72   Resp: 18   Temp: 36 °C (96.8 °F)   SpO2: 93%         Intake/Output this shift:    Intake/Output Summary (Last 24 hours) at 12/13/2023 1039  Last data filed at 12/13/2023 0839  Gross per 24 hour   Intake 240 ml   Output 1850 ml   Net -1610 ml        Allergies:  Allergies   Allergen Reactions    Codeine Hives    Hydrocodone Hives    Tramadol GI bleeding        Medications:  Scheduled medications  acetaminophen, 325 mg, oral, Daily  atenolol, 25 mg, oral, Nightly  DULoxetine, 20 mg, oral, Nightly  ferrous sulfate (325 mg ferrous sulfate), 65 mg of iron, oral, Daily with breakfast  gabapentin, 600 mg, oral, BID  lidocaine, 1 patch, transdermal,  Daily  nystatin, 1 Application, Topical, BID      Continuous medications     PRN medications  PRN medications: acetaminophen, melatonin, morphine, morphine, ondansetron ODT **OR** ondansetron       Labs:  Results for orders placed or performed during the hospital encounter of 12/08/23 (from the past 24 hour(s))   CBC   Result Value Ref Range    WBC 5.4 4.4 - 11.3 x10*3/uL    nRBC 0.0 0.0 - 0.0 /100 WBCs    RBC 3.09 (L) 4.00 - 5.20 x10*6/uL    Hemoglobin 9.3 (L) 12.0 - 16.0 g/dL    Hematocrit 29.7 (L) 36.0 - 46.0 %    MCV 96 80 - 100 fL    MCH 30.1 26.0 - 34.0 pg    MCHC 31.3 (L) 32.0 - 36.0 g/dL    RDW 15.3 (H) 11.5 - 14.5 %    Platelets 188 150 - 450 x10*3/uL   Basic metabolic panel   Result Value Ref Range    Glucose 87 74 - 99 mg/dL    Sodium 139 136 - 145 mmol/L    Potassium 3.7 3.5 - 5.3 mmol/L    Chloride 108 (H) 98 - 107 mmol/L    Bicarbonate 24 21 - 32 mmol/L    Anion Gap 11 10 - 20 mmol/L    Urea Nitrogen 23 6 - 23 mg/dL    Creatinine 0.78 0.50 - 1.05 mg/dL    eGFR 70 >60 mL/min/1.73m*2    Calcium 8.1 (L) 8.6 - 10.3 mg/dL   Protime-INR   Result Value Ref Range    Protime 13.0 (H) 9.8 - 12.8 seconds    INR 1.2 (H) 0.9 - 1.1   Lavender Top   Result Value Ref Range    Extra Tube Hold for add-ons.         Imaging:  No results found.     Plan  Principal Problem:    Hematuria  Active Problems:    Urinary frequency    Supratherapeutic INR    Dysuria    Hematoma of bladder wall    Lower abdominal pain    Other hydronephrosis     - Hemoglobin stable.    - CBI currently clamped, urine yellow. If remains yellow, will discontinue   - No current plan for surgical intervention.  - Coumadin on hold      Plan of care discussed with Dr. Ernie Coleman, APRN-CNP    I spent 15 minutes in the professional and overall care of this patient.

## 2023-12-13 NOTE — PROGRESS NOTES
MRN: [id number]  SERVICE DATE:  12/13/2023   SERVICE TIME:  9:13 AM  Today's Date: 12/13/23  Admission Date: 12/8/2023  Hospital Day: #5    Subjective   Patient seen today having some worsening neuropathic pain           Objective      ROS:   General: Denies any change in weight, fever, chills, fatigue.   Head and Neck: Denies any headaches, syncope or history of head injury.   Eyes/Ears: Denies any cataracts, glaucoma, blurred vision, tinnitus.   Nose: Denies any epistaxis or sinus problems   Respiratory: Denies any cough, hemoptysis, wheezing, asthma, dyspnea.   Cardiovascular: No orthopnea, dyspnea, palpitations, congestive heart failure.   Gastrointestinal: Denies any indigestion, nausea, vomiting, diarrhea, constipation.   Neuro: No dizzyness, headache or syncope   ID: No fever or chills.   Endocrine: No weight loss or weight gain.  No thyroid or diabetic complaints     MEDICATIONS:  Current Facility-Administered Medications   Medication Dose Route Frequency Provider Last Rate Last Admin    acetaminophen (Tylenol) tablet 325 mg  325 mg oral Daily ARMEN Hoang   325 mg at 12/12/23 0934    acetaminophen (Tylenol) tablet 650 mg  650 mg oral q4h PRN ARMEN Meek   650 mg at 12/11/23 1535    atenolol (Tenormin) tablet 25 mg  25 mg oral Nightly ARMEN Meek   25 mg at 12/11/23 2156    DULoxetine (Cymbalta) DR capsule 20 mg  20 mg oral Nightly ARMEN Meek   20 mg at 12/12/23 2024    ferrous sulfate (325 mg ferrous sulfate) tablet 1 tablet  65 mg of iron oral Daily with breakfast ARMEN Hoang   1 tablet at 12/12/23 0934    gabapentin (Neurontin) capsule 300 mg  300 mg oral Daily ARMEN Hoang   300 mg at 12/12/23 0934    gabapentin (Neurontin) capsule 600 mg  600 mg oral Nightly ARMEN Meek   600 mg at 12/12/23 2024    lidocaine 4 % patch 1 patch  1 patch transdermal Daily ARMEN Hoang   1 patch at  "12/12/23 0934    melatonin tablet 3 mg  3 mg oral Nightly PRN LIA MeekCNP   3 mg at 12/12/23 1830    morphine injection 1 mg  1 mg intravenous q4h PRN ARMEN Meek        morphine injection 2 mg  2 mg intravenous q4h PRN ARMEN Meek        nystatin (Mycostatin) 100,000 unit/gram powder 1 Application  1 Application Topical BID KELSEA Almodovar-CNP   1 Application at 12/12/23 2024    ondansetron ODT (Zofran-ODT) disintegrating tablet 4 mg  4 mg oral q8h PRN ARMEN Meek        Or    ondansetron (Zofran) injection 4 mg  4 mg intravenous q8h PRN ARMEN Meek             PHYSICAL EXAM:   /63 (BP Location: Left arm, Patient Position: Lying)   Pulse 72   Temp 36 °C (96.8 °F) (Temporal)   Resp 18   Ht 1.651 m (5' 5\")   Wt 62.4 kg (137 lb 9.6 oz)   SpO2 93%   BMI 22.90 kg/m²    CONSTITUTIONAL - well nourished, well developed, looks like stated age, in no acute distress, not ill-appearing   SKIN - normal skin color and pigmentation, normal skin turgor without rash   HEAD - no trauma, normocephalic  EYES - pupils are equal and reactive to light, extraocular muscles are intact, and normal external exam  ENT - TM's intact, no injection, no signs of infection, uvula midline, normal tongue movement and throat normal  NECK - supple without rigidity, no neck mass was observed, no thyromegaly    CHEST - clear to auscultation, no wheezing, no crackles and no rales, good effort  CARDIAC - regular rate and regular rhythm, no skipped beats, no murmur  ABDOMEN - no organomegaly, soft, nontender, nondistended, normal bowel sounds   NEUROLOGICAL -neuropathic pain  PSYCHIATRIC - alert, pleasant and cordial, age-appropriate  IMMUNOLOGIC - no cervical lymphadenopathy  Baer in place    Labs:  Lab Results   Component Value Date    WBC 5.4 12/13/2023    HGB 9.3 (L) 12/13/2023    HCT 29.7 (L) 12/13/2023    MCV 96 12/13/2023     " "12/13/2023     Lab Results   Component Value Date    GLUCOSE 86 12/12/2023    CALCIUM 8.2 (L) 12/12/2023     12/12/2023    K 4.3 12/12/2023    CO2 22 12/12/2023     (H) 12/12/2023    BUN 33 (H) 12/12/2023    CREATININE 1.00 12/12/2023   ESR: --No results found for: \"SEDRATE\"No results found for: \"CRP\"  Lab Results   Component Value Date    ALT 7 12/08/2023    AST 14 12/08/2023    ALKPHOS 86 12/08/2023    BILITOT 0.6 12/08/2023                      Problem List Items Addressed This Visit             ICD-10-CM    Supratherapeutic INR R79.1    * (Principal) Hematuria R31.9     Other Visit Diagnoses         Codes    Vaginal bleeding    -  Primary N93.9    Adverse effect of warfarin     T45.515A    Hydronephrosis due to obstruction of bladder     N13.30, N32.0    Hyperkalemia     E87.5    Acidosis     E87.20    YAA (acute kidney injury) (CMS/HCC)     N17.9          Urinary frequency  Supratherapeutic INR  Hematuria  Dysuria  Hematoma of the bladder wall    Monitor intake and output  Monitor for recurrence of hematuria  Check H&H  Adjust gabapentin dose              Ciro Garcia MD  This note was created using [employer]. Any inadvertent grammar, spelling or syntax errors are do to voice recognition software error and are not intentional.      "

## 2023-12-13 NOTE — PROGRESS NOTES
Physical Therapy                 Therapy Communication Note    Patient Name: Noemí Hinton  MRN: [id number]  Today's Date: 12/13/2023     Discipline: Physical Therapy    Missed Visit Reason: Missed Visit Reason: Patient sleeping    Missed Time: Attempt    Comment: Pt is currently soundly sleeping. Pt's  passed away yesterday, family in room asking to let pt rest. Will continue to see per POC.

## 2023-12-13 NOTE — PROGRESS NOTES
Occupational Therapy                 Therapy Communication Note    Patient Name: Noemí Hinton  MRN: [id number]  Today's Date: 12/13/2023     Discipline: Occupational Therapy    Missed Visit Reason:      Missed Time: Attempt    Comment: 12:00 attempt. Pt is sleeping. Pt  passed away yesterday. Family in room asking to let pt rest.

## 2023-12-13 NOTE — CARE PLAN
The patient's goals for the shift include to remain fall free    The clinical goals for the shift include remain afebrile

## 2023-12-14 LAB
ANION GAP SERPL CALC-SCNC: 9 MMOL/L (ref 10–20)
BUN SERPL-MCNC: 14 MG/DL (ref 6–23)
CALCIUM SERPL-MCNC: 8 MG/DL (ref 8.6–10.3)
CHLORIDE SERPL-SCNC: 106 MMOL/L (ref 98–107)
CO2 SERPL-SCNC: 27 MMOL/L (ref 21–32)
CREAT SERPL-MCNC: 0.79 MG/DL (ref 0.5–1.05)
ERYTHROCYTE [DISTWIDTH] IN BLOOD BY AUTOMATED COUNT: 15.2 % (ref 11.5–14.5)
GFR SERPL CREATININE-BSD FRML MDRD: 69 ML/MIN/1.73M*2
GLUCOSE SERPL-MCNC: 89 MG/DL (ref 74–99)
HCT VFR BLD AUTO: 30.8 % (ref 36–46)
HGB BLD-MCNC: 9.7 G/DL (ref 12–16)
INR PPP: 1.2 (ref 0.9–1.1)
MCH RBC QN AUTO: 29.9 PG (ref 26–34)
MCHC RBC AUTO-ENTMCNC: 31.5 G/DL (ref 32–36)
MCV RBC AUTO: 95 FL (ref 80–100)
NRBC BLD-RTO: 0 /100 WBCS (ref 0–0)
PLATELET # BLD AUTO: 191 X10*3/UL (ref 150–450)
POTASSIUM SERPL-SCNC: 3.7 MMOL/L (ref 3.5–5.3)
PROTHROMBIN TIME: 13.6 SECONDS (ref 9.8–12.8)
RBC # BLD AUTO: 3.24 X10*6/UL (ref 4–5.2)
SODIUM SERPL-SCNC: 138 MMOL/L (ref 136–145)
WBC # BLD AUTO: 6.9 X10*3/UL (ref 4.4–11.3)

## 2023-12-14 PROCEDURE — 97530 THERAPEUTIC ACTIVITIES: CPT | Mod: GP,CQ

## 2023-12-14 PROCEDURE — 1100000001 HC PRIVATE ROOM DAILY

## 2023-12-14 PROCEDURE — 97110 THERAPEUTIC EXERCISES: CPT | Mod: GP,CQ

## 2023-12-14 PROCEDURE — 85610 PROTHROMBIN TIME: CPT | Performed by: NURSE PRACTITIONER

## 2023-12-14 PROCEDURE — 80048 BASIC METABOLIC PNL TOTAL CA: CPT | Performed by: NURSE PRACTITIONER

## 2023-12-14 PROCEDURE — 2500000005 HC RX 250 GENERAL PHARMACY W/O HCPCS: Performed by: NURSE PRACTITIONER

## 2023-12-14 PROCEDURE — 36415 COLL VENOUS BLD VENIPUNCTURE: CPT | Performed by: NURSE PRACTITIONER

## 2023-12-14 PROCEDURE — 2500000001 HC RX 250 WO HCPCS SELF ADMINISTERED DRUGS (ALT 637 FOR MEDICARE OP): Performed by: INTERNAL MEDICINE

## 2023-12-14 PROCEDURE — 99231 SBSQ HOSP IP/OBS SF/LOW 25: CPT | Performed by: NURSE PRACTITIONER

## 2023-12-14 PROCEDURE — 97535 SELF CARE MNGMENT TRAINING: CPT | Mod: GO,CO

## 2023-12-14 PROCEDURE — 2500000004 HC RX 250 GENERAL PHARMACY W/ HCPCS (ALT 636 FOR OP/ED): Performed by: NURSE PRACTITIONER

## 2023-12-14 PROCEDURE — 2500000001 HC RX 250 WO HCPCS SELF ADMINISTERED DRUGS (ALT 637 FOR MEDICARE OP): Performed by: NURSE PRACTITIONER

## 2023-12-14 PROCEDURE — 85027 COMPLETE CBC AUTOMATED: CPT | Performed by: NURSE PRACTITIONER

## 2023-12-14 RX ORDER — NYSTATIN 100000 [USP'U]/G
1 POWDER TOPICAL 2 TIMES DAILY
Start: 2023-12-14

## 2023-12-14 RX ORDER — GABAPENTIN 600 MG/1
600 TABLET ORAL 2 TIMES DAILY
Start: 2023-12-14 | End: 2023-12-18 | Stop reason: HOSPADM

## 2023-12-14 RX ORDER — POTASSIUM CHLORIDE 20 MEQ/1
20 TABLET, EXTENDED RELEASE ORAL ONCE
Status: COMPLETED | OUTPATIENT
Start: 2023-12-14 | End: 2023-12-14

## 2023-12-14 RX ADMIN — NYSTATIN 1 APPLICATION: 100000 POWDER TOPICAL at 08:09

## 2023-12-14 RX ADMIN — LIDOCAINE 1 PATCH: 4 PATCH TOPICAL at 08:09

## 2023-12-14 RX ADMIN — GABAPENTIN 600 MG: 300 CAPSULE ORAL at 08:10

## 2023-12-14 RX ADMIN — POTASSIUM CHLORIDE 20 MEQ: 1500 TABLET, EXTENDED RELEASE ORAL at 12:19

## 2023-12-14 RX ADMIN — GABAPENTIN 600 MG: 300 CAPSULE ORAL at 21:54

## 2023-12-14 RX ADMIN — DULOXETINE HYDROCHLORIDE 20 MG: 20 CAPSULE, DELAYED RELEASE ORAL at 21:54

## 2023-12-14 RX ADMIN — NYSTATIN 1 APPLICATION: 100000 POWDER TOPICAL at 21:54

## 2023-12-14 RX ADMIN — ATENOLOL 25 MG: 25 TABLET ORAL at 21:53

## 2023-12-14 ASSESSMENT — PAIN - FUNCTIONAL ASSESSMENT
PAIN_FUNCTIONAL_ASSESSMENT: 0-10

## 2023-12-14 ASSESSMENT — ACTIVITIES OF DAILY LIVING (ADL)
BATHING_LEVEL_OF_ASSISTANCE: MAXIMUM ASSISTANCE
EFFECT OF PAIN ON DAILY ACTIVITIES: .
HOME_MANAGEMENT_TIME_ENTRY: 30

## 2023-12-14 ASSESSMENT — COGNITIVE AND FUNCTIONAL STATUS - GENERAL
CLIMB 3 TO 5 STEPS WITH RAILING: TOTAL
DAILY ACTIVITIY SCORE: 9
DRESSING REGULAR UPPER BODY CLOTHING: TOTAL
MOVING FROM LYING ON BACK TO SITTING ON SIDE OF FLAT BED WITH BEDRAILS: A LITTLE
DRESSING REGULAR LOWER BODY CLOTHING: A LOT
MOVING TO AND FROM BED TO CHAIR: A LOT
STANDING UP FROM CHAIR USING ARMS: A LOT
HELP NEEDED FOR BATHING: TOTAL
STANDING UP FROM CHAIR USING ARMS: A LOT
CLIMB 3 TO 5 STEPS WITH RAILING: TOTAL
TURNING FROM BACK TO SIDE WHILE IN FLAT BAD: A LOT
PERSONAL GROOMING: A LOT
WALKING IN HOSPITAL ROOM: TOTAL
DRESSING REGULAR LOWER BODY CLOTHING: TOTAL
TURNING FROM BACK TO SIDE WHILE IN FLAT BAD: A LOT
MOBILITY SCORE: 11
DAILY ACTIVITIY SCORE: 16
MOVING FROM LYING ON BACK TO SITTING ON SIDE OF FLAT BED WITH BEDRAILS: A LITTLE
DRESSING REGULAR UPPER BODY CLOTHING: A LITTLE
MOVING TO AND FROM BED TO CHAIR: A LOT
HELP NEEDED FOR BATHING: A LOT
TOILETING: A LOT
MOBILITY SCORE: 12
PERSONAL GROOMING: A LITTLE
WALKING IN HOSPITAL ROOM: A LOT
TOILETING: TOTAL
EATING MEALS: A LITTLE

## 2023-12-14 ASSESSMENT — PAIN SCALES - GENERAL
PAINLEVEL_OUTOF10: 0 - NO PAIN
PAINLEVEL_OUTOF10: 1
PAINLEVEL_OUTOF10: 0 - NO PAIN

## 2023-12-14 NOTE — CARE PLAN
The patient's goals for the shift include to remain fall free    The clinical goals for the shift include urine will remain free of clots

## 2023-12-14 NOTE — PROGRESS NOTES
Physical Therapy    Physical Therapy    Physical Therapy Treatment    Patient Name: Noemí Hinton  MRN: [id number]  Today's Date: 12/14/2023  Time Calculation  Start Time: 1138  Stop Time: 1216  Time Calculation (min): 38 min       Assessment/Plan   PT Assessment  PT Assessment Results: Decreased strength, Decreased endurance, Impaired balance, Decreased mobility  Rehab Prognosis: Good  Evaluation/Treatment Tolerance: Patient tolerated treatment well  Medical Staff Made Aware: Yes  End of Session Communication: Bedside nurse  Assessment Comment: Pt is pleasant, however, weepy d/t  passing away. Good effort put forth by pt. Increased time and effort to complete all activities. Pt incontient upon standing, increased time for aditya care.  End of Session Patient Position: Up in chair, Alarm on  PT Plan  Inpatient/Swing Bed or Outpatient: Inpatient  PT Plan  Treatment/Interventions: Bed mobility, Transfer training, Gait training, Balance training, Neuromuscular re-education, Strengthening, Therapeutic exercise, Therapeutic activity  PT Plan: Skilled PT  PT Frequency: 3 times per week  PT Discharge Recommendations: Moderate intensity level of continued care  Equipment Recommended upon Discharge: Wheeled walker  PT Recommended Transfer Status: Assist x1  PT - OK to Discharge: Yes     12/14/23 1138   PT  Visit   PT Received On 12/14/23   Response to Previous Treatment Patient with no complaints from previous session.   General   Prior to Session Communication Bedside nurse   Patient Position Received Bed, 3 rail up;Alarm on   General Comment Pt agreeable to therapy and cleared for participation.   Precautions   Medical Precautions Fall precautions   Pain Assessment   Pain Assessment 0-10   Pain Score 0 - No pain   Effect of Pain on Daily Activities .   Cognition   Overall Cognitive Status WFL   Therapeutic Exercise   Therapeutic Exercise Performed Yes   Therapeutic Exercise Activity 1 AP/LAQ/marching/ABD/ADD 2x10    Therapeutic Activity   Therapeutic Activity Performed Yes   Bed Mobility   Bed Mobility Yes   Bed Mobility 1   Bed Mobility 1 Supine to sitting   Level of Assistance 1 Moderate assistance  (x2)   Ambulation/Gait Training   Ambulation/Gait Training Performed Yes   Ambulation/Gait Training 1   Surface 1 Level tile   Device 1 Rolling walker   Gait Support Devices Gait belt   Assistance 1 Minimum assistance  (x2)   Comments/Distance (ft) 1 7 feet to chair- flexed posture, mod cues for sequencing, stability and safety with fair follow thorugh, very slow haroon.   Transfers   Transfer Yes   Transfer 1   Transfer From 1 Bed to   Transfer to 1 Stand   Technique 1 Stand to sit;Sit to stand   Transfer Device 1 Gait belt;Walker   Transfer Level of Assistance 1 Minimum assistance;+2   Trials/Comments 1 Multiuple transfers from bed>stand>chair>BSC- minAx2 with mod cues for hand placement, sequencing and safety.   Activity Tolerance   Endurance Tolerates 30 min exercise with multiple rests   PT Assessment   PT Assessment Results Decreased strength;Decreased endurance;Impaired balance;Decreased mobility   Rehab Prognosis Good   End of Session Communication Bedside nurse   Assessment Comment Pt is pleasant, however, weepy d/t  passing away. Good effort put forth by pt. Increased time and effort to complete all activities. Pt incontient upon standing, increased time for aditya care.   End of Session Patient Position Up in chair;Alarm on     Outcome Measures:  [facility] Basic Mobility  Turning from your back to your side while in a flat bed without using bedrails: A little  Moving from lying on your back to sitting on the side of a flat bed without using bedrails: A lot  Moving to and from bed to chair (including a wheelchair): A lot  Standing up from a chair using your arms (e.g. wheelchair or bedside chair): A lot  To walk in hospital room: A lot  Climbing 3-5 steps with railing: Total  Basic Mobility - Total Score:  12  Education Documentation  Home Exercise Program, taught by Naa Vaughan PTA at 12/14/2023 12:39 PM.  Learner: Patient  Readiness: Acceptance  Method: Explanation, Demonstration  Response: Verbalizes Understanding, Demonstrated Understanding    Mobility Training, taught by Naa Vaughan PTA at 12/14/2023 12:39 PM.  Learner: Patient  Readiness: Acceptance  Method: Explanation, Demonstration  Response: Verbalizes Understanding, Demonstrated Understanding    Education Comments  No comments found.            EDUCATION:  Outpatient Education  Individual(s) Educated: Patient  Education Provided: Fall Risk    GOALS:  Encounter Problems       Encounter Problems (Active)       PT Problem       Pt will be able to perform all bed mobility tasks with CGA.  (Progressing)       Start:  12/11/23    Expected End:  12/25/23            Pt will perform all transfers with CGA and FWW with proper safety mechanics.   (Progressing)       Start:  12/11/23    Expected End:  12/25/23            Pt will ambulate 30 ft with CGA using FWW for improved functional independence.  (Progressing)       Start:  12/11/23    Expected End:  12/25/23            Pt will demonstrate good stand balance for completion of therapeutic exercises and functional tasks.  (Progressing)       Start:  12/11/23    Expected End:  12/25/23               Pain - Adult

## 2023-12-14 NOTE — PROGRESS NOTES
MRN: [id number]  SERVICE DATE:  12/14/2023   SERVICE TIME:  8:25 AM  Today's Date: 12/14/23  Admission Date: 12/8/2023  Hospital Day: #6    Subjective   Patient seen today no significant change in mentation CBI clamped.  Coumadin on hold           Objective      ROS:   General: Denies any change in weight, fever, chills, fatigue.   Head and Neck: Denies any headaches, syncope or history of head injury.   Eyes/Ears: Denies any cataracts, glaucoma, blurred vision, tinnitus.   Nose: Denies any epistaxis or sinus problems   Respiratory: Denies any cough, hemoptysis, wheezing, asthma, dyspnea.   Cardiovascular: No orthopnea, dyspnea, palpitations, congestive heart failure.   Gastrointestinal: Denies any indigestion, nausea, vomiting, diarrhea, constipation.   Neuro: No dizzyness, headache or syncope   ID: No fever or chills.   Endocrine: No weight loss or weight gain.  No thyroid or diabetic complaints     MEDICATIONS:  Current Facility-Administered Medications   Medication Dose Route Frequency Provider Last Rate Last Admin    acetaminophen (Tylenol) tablet 325 mg  325 mg oral Daily LIA HoangCNP   325 mg at 12/13/23 1051    acetaminophen (Tylenol) tablet 650 mg  650 mg oral q4h PRN ARMEN Meek   650 mg at 12/11/23 1535    atenolol (Tenormin) tablet 25 mg  25 mg oral Nightly ARMEN Meek   25 mg at 12/13/23 2158    DULoxetine (Cymbalta) DR capsule 20 mg  20 mg oral Nightly ARMEN Meek   20 mg at 12/13/23 2158    ferrous sulfate (325 mg ferrous sulfate) tablet 1 tablet  65 mg of iron oral Daily with breakfast LIA HoangCNP   1 tablet at 12/13/23 1051    gabapentin (Neurontin) tablet 600 mg  600 mg oral BID Ciro Garcia MD   600 mg at 12/14/23 0810    lidocaine 4 % patch 1 patch  1 patch transdermal Daily ARMEN Hoang   1 patch at 12/14/23 0809    melatonin tablet 3 mg  3 mg oral Nightly PRN ARMEN Meek   3 mg at  "12/12/23 1830    morphine injection 1 mg  1 mg intravenous q4h PRN ARMEN Meek        morphine injection 2 mg  2 mg intravenous q4h PRN ARMEN Meek        nystatin (Mycostatin) 100,000 unit/gram powder 1 Application  1 Application Topical BID ARMEN Almodovar   1 Application at 12/14/23 0809    ondansetron ODT (Zofran-ODT) disintegrating tablet 4 mg  4 mg oral q8h PRN ARMEN Meek        Or    ondansetron (Zofran) injection 4 mg  4 mg intravenous q8h PRN ARMEN Meek             PHYSICAL EXAM:   /77 (BP Location: Right arm, Patient Position: Lying)   Pulse 69   Temp 36.4 °C (97.5 °F) (Temporal)   Resp 18   Ht 1.651 m (5' 5\")   Wt 62.4 kg (137 lb 9.6 oz)   SpO2 92%   BMI 22.90 kg/m²    CONSTITUTIONAL - well nourished, well developed, looks like stated age, in no acute distress, not ill-appearing   SKIN - normal skin color and pigmentation, normal skin turgor without rash   HEAD - no trauma, normocephalic  EYES - pupils are equal and reactive to light, extraocular muscles are intact, and normal external exam  ENT - TM's intact, no injection, no signs of infection, uvula midline, normal tongue movement and throat normal  NECK - supple without rigidity, no neck mass was observed, no thyromegaly    CHEST - clear to auscultation, no wheezing, no crackles and no rales, good effort  CARDIAC - regular rate and regular rhythm, no skipped beats, no murmur  ABDOMEN - no organomegaly, soft, nontender, nondistended, normal bowel sounds   NEUROLOGICAL -lethargic slow to answer questions generalized muscular weakness    Labs:  Lab Results   Component Value Date    WBC 6.9 12/14/2023    HGB 9.7 (L) 12/14/2023    HCT 30.8 (L) 12/14/2023    MCV 95 12/14/2023     12/14/2023     Lab Results   Component Value Date    GLUCOSE 89 12/14/2023    CALCIUM 8.0 (L) 12/14/2023     12/14/2023    K 3.7 12/14/2023    CO2 27 12/14/2023    " " 12/14/2023    BUN 14 12/14/2023    CREATININE 0.79 12/14/2023   ESR: --No results found for: \"SEDRATE\"No results found for: \"CRP\"  Lab Results   Component Value Date    ALT 7 12/08/2023    AST 14 12/08/2023    ALKPHOS 86 12/08/2023    BILITOT 0.6 12/08/2023                      Problem List Items Addressed This Visit             ICD-10-CM    Supratherapeutic INR R79.1    * (Principal) Hematuria R31.9     Other Visit Diagnoses         Codes    Vaginal bleeding    -  Primary N93.9    Adverse effect of warfarin     T45.515A    Hydronephrosis due to obstruction of bladder     N13.30, N32.0    Hyperkalemia     E87.5    Acidosis     E87.20    YAA (acute kidney injury) (CMS/HCC)     N17.9        H&H stable  CBI clamped  Monitor intake and output  Hold Coumadin  Recheck today's blood work                 Ciro Garcia MD  This note was created using [employer]. Any inadvertent grammar, spelling or syntax errors are do to voice recognition software error and are not intentional.      "

## 2023-12-14 NOTE — CARE PLAN
The patient's goals for the shift include PATIENT WILL REMAIN SAFE FROM FALLS AND INJURIES DURING THIS SHIFT    The clinical goals for the shift include no hematuria      Problem: Pain  Goal: My pain/discomfort is manageable  Outcome: Progressing     Problem: Safety  Goal: Patient will be injury free during hospitalization  Outcome: Progressing  Goal: I will remain free of falls  Outcome: Progressing     Problem: Psychosocial Needs  Goal: Demonstrates ability to cope with hospitalization/illness  Outcome: Progressing  Goal: Collaborate with me, my family, and caregiver to identify my specific goals  Outcome: Progressing     Problem: Discharge Barriers  Goal: My discharge needs are met  Outcome: Progressing     Problem: Skin  Goal: Decreased wound size/increased tissue granulation at next dressing change  Outcome: Progressing  Goal: Participates in plan/prevention/treatment measures  Outcome: Progressing  Goal: Prevent/manage excess moisture  Outcome: Progressing  Goal: Prevent/minimize sheer/friction injuries  Outcome: Progressing  Goal: Promote/optimize nutrition  Outcome: Progressing  Goal: Promote skin healing  Outcome: Progressing     Problem: Acute Kidney Failure  Goal: Electrolytes/labs return to normal range  Outcome: Progressing  Goal: No signs of infection  Outcome: Progressing  Goal: Stable weight and I&O  Outcome: Progressing     Problem: Obstructive: Kidney Stones, Hydronephrosis, BPH  Goal: Return of normal voiding pattern  Outcome: Progressing     Problem: Pain - Adult  Goal: Verbalizes/displays adequate comfort level or baseline comfort level  Outcome: Progressing     Problem: Safety - Adult  Goal: Free from fall injury  Outcome: Progressing     Problem: Discharge Planning  Goal: Discharge to home or other facility with appropriate resources  Outcome: Progressing     Problem: Chronic Conditions and Co-morbidities  Goal: Patient's chronic conditions and co-morbidity symptoms are monitored and  maintained or improved  Outcome: Progressing

## 2023-12-14 NOTE — PROGRESS NOTES
Occupational Therapy    OT Treatment    Patient Name: Noemí Hinton  MRN: [id number]  Today's Date: 12/14/2023  Time Calculation  Start Time: 1139  Stop Time: 1220  Time Calculation (min): 41 min         Assessment:  End of Session Communication: Bedside nurse  End of Session Patient Position: Up in chair, Alarm on  OT Assessment Results: Decreased ADL status, Decreased upper extremity range of motion, Decreased upper extremity strength, Decreased endurance, Decreased functional mobility    Plan:  Treatment Interventions: ADL retraining, Functional transfer training  OT Frequency: 3 times per week until discharge  OT Discharge Recommendations: Moderate intensity level of continued care  Equipment Recommended upon Discharge: Wheeled walker  Treatment Interventions: ADL retraining, Functional transfer training  Subjective     Outcome Measures:[facility] Daily Activity  Putting on and taking off regular lower body clothing: A lot  Bathing (including washing, rinsing, drying): A lot  Putting on and taking off regular upper body clothing: A little  Toileting, which includes using toilet, bedpan or urinal: A lot  Taking care of personal grooming such as brushing teeth: A little  Eating Meals: None  Daily Activity - Total Score: 16       12/14/23 1139   OT Last Visit   OT Received On 12/14/23   General   Reason for Referral ADL's; Safety Assessment   Co-Treatment PT   Co-Treatment Reason pt safety   Prior to Session Communication Bedside nurse   Patient Position Received Bed, 3 rail up;Alarm on   Preferred Learning Style verbal;visual   General Comment Pt agreeable to therapy and cleared for participation.   Precautions   Medical Precautions Fall precautions   Pain Assessment   Pain Assessment 0-10   Pain Score 0 - No pain   Cognition   Overall Cognitive Status WFL   Grooming   Grooming Level of Assistance Setup   Grooming Where Assessed Chair   Grooming Comments washed hands   UE Bathing   UE Bathing Level of Assistance Minimum  assistance   UE Bathing Where Assessed Edge of bed   UE Bathing Comments pt washed  under arms, required assist for thoroughness   LE Bathing   LE Bathing Level of Assistance Maximum assistance   LE Bathing Where Assessed   (bedside chair)   LE Bathing Comments Pt dribbled urine down her legs and therapist washed legs   UE Dressing   UE Dressing Level of Assistance Minimum assistance   UE Dressing Where Assessed Edge of bed   UE Dressing Comments changed hospoital gown   LE Dressing   LE Dressing   (min A to doff socks, Max A to don socks. Pt uses sock aide at home and therapist provided sock aide in which pt required min A to use sock aide)   Toileting   Toileting Level of Assistance Maximum assistance   Toileting Comments Pt dribbled urine transferring to chair. pt asked for BSC and required max A for posterior aditya care in stance .   Bed Mobility   Bed Mobility   (mod A x 2 people to get to EOB)   Transfers   Transfer   (STS min A of 2 people, cues for correct hand positioning)   Static Sitting Balance   Static Sitting-Comment/Number of Minutes fair +, at times with retro lean   Toilet Transfers   Toilet Transfers Comments mod A of 2 people to tranfer from chair to BSC and back   IP OT Assessment   End of Session Communication Bedside nurse   End of Session Patient Position Up in chair;Alarm on   OT Assessment   OT Assessment Results Decreased ADL status;Decreased upper extremity range of motion;Decreased upper extremity strength;Decreased endurance;Decreased functional mobility   Education   Individual(s) Educated Patient   Education Provided Fall precautons;Ergonomics and postural realignment   Patient Response to Education Patient/Caregiver Verbalized Understanding of Information   Education Comment Pt would benefit from continued reinforcement   Inpatient Plan   Treatment Interventions ADL retraining;Functional transfer training   OT Frequency 3 times per week until discharge   OT Discharge Recommendations  Moderate intensity level of continued care   Equipment Recommended upon Discharge Wheeled walker           Goals:  Encounter Problems       Encounter Problems (Active)       OT Goals       OT Goal 1 (Progressing)       Start:  12/11/23    Expected End:  12/25/23       Pt will complete all bed mobility with Min A safely            OT Goal 2 (Progressing)       Start:  12/11/23    Expected End:  12/25/23       Pt will complete ADL's and mobility with good sit balance and fair+ stand balance           OT Goal 3 (Progressing)       Start:  12/11/23    Expected End:  12/25/23       Pt with complete all transfers safely with CGA           OT Goal 4 (Progressing)       Start:  12/11/23    Expected End:  12/25/23       Pt will complete UB dressing ADL's with CGA using adaptive device(s) as needed          OT Goal 5 (Progressing)       Start:  12/11/23    Expected End:  12/25/23       Pt with complete grooming ADL's with supervision after setup

## 2023-12-14 NOTE — PROGRESS NOTES
Noemí Hinton 89 y.o. female    Subjective  Patient seen and examined this morning.  Patient resting comfortably in bed.  Amos catheter with clear yellow urine.  No fever or chills.  No acute events overnight.     Objective    PHYSICAL EXAM:  Physical Exam  Vitals reviewed.   Constitutional:       General: She is awake.      Appearance: Normal appearance.   Cardiovascular:      Rate and Rhythm: Normal rate and regular rhythm.      Pulses: Normal pulses.      Heart sounds: Normal heart sounds.   Pulmonary:      Effort: Pulmonary effort is normal.      Breath sounds: Normal breath sounds and air entry.   Abdominal:      General: Abdomen is flat. There is no distension.      Palpations: Abdomen is soft.      Tenderness: There is no abdominal tenderness. There is no guarding or rebound.   Genitourinary:     Comments: 3 way amos in place with CBI clamped, urine yellow.   Musculoskeletal:         General: Normal range of motion.      Cervical back: Normal range of motion.   Skin:     General: Skin is warm and dry.   Neurological:      General: No focal deficit present.      Mental Status: She is alert and oriented to person, place, and time.   Psychiatric:         Behavior: Behavior is cooperative.         Vital signs in last 24 hours:  Vitals:    12/14/23 0000   BP: 122/77   Pulse: 69   Resp: 18   Temp: 36.4 °C (97.5 °F)   SpO2: 92%         Intake/Output this shift:    Intake/Output Summary (Last 24 hours) at 12/14/2023 0919  Last data filed at 12/14/2023 0845  Gross per 24 hour   Intake 240 ml   Output 2050 ml   Net -1810 ml        Allergies:  Allergies   Allergen Reactions    Codeine Hives    Hydrocodone Hives    Tramadol GI bleeding        Medications:  Scheduled medications  acetaminophen, 325 mg, oral, Daily  atenolol, 25 mg, oral, Nightly  DULoxetine, 20 mg, oral, Nightly  ferrous sulfate (325 mg ferrous sulfate), 65 mg of iron, oral, Daily with breakfast  gabapentin, 600 mg, oral, BID  lidocaine, 1 patch,  transdermal, Daily  nystatin, 1 Application, Topical, BID      Continuous medications     PRN medications  PRN medications: acetaminophen, melatonin, morphine, morphine, ondansetron ODT **OR** ondansetron       Labs:  Results for orders placed or performed during the hospital encounter of 12/08/23 (from the past 24 hour(s))   CBC   Result Value Ref Range    WBC 6.9 4.4 - 11.3 x10*3/uL    nRBC 0.0 0.0 - 0.0 /100 WBCs    RBC 3.24 (L) 4.00 - 5.20 x10*6/uL    Hemoglobin 9.7 (L) 12.0 - 16.0 g/dL    Hematocrit 30.8 (L) 36.0 - 46.0 %    MCV 95 80 - 100 fL    MCH 29.9 26.0 - 34.0 pg    MCHC 31.5 (L) 32.0 - 36.0 g/dL    RDW 15.2 (H) 11.5 - 14.5 %    Platelets 191 150 - 450 x10*3/uL   Basic metabolic panel   Result Value Ref Range    Glucose 89 74 - 99 mg/dL    Sodium 138 136 - 145 mmol/L    Potassium 3.7 3.5 - 5.3 mmol/L    Chloride 106 98 - 107 mmol/L    Bicarbonate 27 21 - 32 mmol/L    Anion Gap 9 (L) 10 - 20 mmol/L    Urea Nitrogen 14 6 - 23 mg/dL    Creatinine 0.79 0.50 - 1.05 mg/dL    eGFR 69 >60 mL/min/1.73m*2    Calcium 8.0 (L) 8.6 - 10.3 mg/dL   Protime-INR   Result Value Ref Range    Protime 13.6 (H) 9.8 - 12.8 seconds    INR 1.2 (H) 0.9 - 1.1        Imaging:  No results found.     Plan  Principal Problem:    Hematuria  Active Problems:    Urinary frequency    Supratherapeutic INR    Dysuria    Hematoma of bladder wall    Lower abdominal pain    Other hydronephrosis     - Hemoglobin stable.    - Trial of void this morning.   - No current plan for surgical intervention.  - Coumadin on hold - will defer to medicine team     Plan of care discussed with Dr. Klein and urology will sign off. Please call with any concerns.     KELSEA Thompson-CNP    I spent 15 minutes in the professional and overall care of this patient.

## 2023-12-15 LAB
ANION GAP SERPL CALC-SCNC: 10 MMOL/L (ref 10–20)
BUN SERPL-MCNC: 14 MG/DL (ref 6–23)
CALCIUM SERPL-MCNC: 8.4 MG/DL (ref 8.6–10.3)
CHLORIDE SERPL-SCNC: 106 MMOL/L (ref 98–107)
CO2 SERPL-SCNC: 27 MMOL/L (ref 21–32)
CREAT SERPL-MCNC: 0.85 MG/DL (ref 0.5–1.05)
ERYTHROCYTE [DISTWIDTH] IN BLOOD BY AUTOMATED COUNT: 15.4 % (ref 11.5–14.5)
GFR SERPL CREATININE-BSD FRML MDRD: 63 ML/MIN/1.73M*2
GLUCOSE SERPL-MCNC: 98 MG/DL (ref 74–99)
HCT VFR BLD AUTO: 32.6 % (ref 36–46)
HGB BLD-MCNC: 9.8 G/DL (ref 12–16)
INR PPP: 1.2 (ref 0.9–1.1)
MCH RBC QN AUTO: 29.5 PG (ref 26–34)
MCHC RBC AUTO-ENTMCNC: 30.1 G/DL (ref 32–36)
MCV RBC AUTO: 98 FL (ref 80–100)
NRBC BLD-RTO: 0 /100 WBCS (ref 0–0)
PLATELET # BLD AUTO: 204 X10*3/UL (ref 150–450)
POTASSIUM SERPL-SCNC: 4 MMOL/L (ref 3.5–5.3)
PROTHROMBIN TIME: 13.2 SECONDS (ref 9.8–12.8)
RBC # BLD AUTO: 3.32 X10*6/UL (ref 4–5.2)
SODIUM SERPL-SCNC: 139 MMOL/L (ref 136–145)
WBC # BLD AUTO: 7.7 X10*3/UL (ref 4.4–11.3)

## 2023-12-15 PROCEDURE — 2500000001 HC RX 250 WO HCPCS SELF ADMINISTERED DRUGS (ALT 637 FOR MEDICARE OP): Performed by: INTERNAL MEDICINE

## 2023-12-15 PROCEDURE — 2500000001 HC RX 250 WO HCPCS SELF ADMINISTERED DRUGS (ALT 637 FOR MEDICARE OP): Performed by: NURSE PRACTITIONER

## 2023-12-15 PROCEDURE — 85027 COMPLETE CBC AUTOMATED: CPT | Performed by: NURSE PRACTITIONER

## 2023-12-15 PROCEDURE — 80048 BASIC METABOLIC PNL TOTAL CA: CPT | Performed by: NURSE PRACTITIONER

## 2023-12-15 PROCEDURE — 36415 COLL VENOUS BLD VENIPUNCTURE: CPT | Performed by: NURSE PRACTITIONER

## 2023-12-15 PROCEDURE — 2500000005 HC RX 250 GENERAL PHARMACY W/O HCPCS: Performed by: NURSE PRACTITIONER

## 2023-12-15 PROCEDURE — 85610 PROTHROMBIN TIME: CPT | Performed by: NURSE PRACTITIONER

## 2023-12-15 PROCEDURE — 1100000001 HC PRIVATE ROOM DAILY

## 2023-12-15 RX ADMIN — NYSTATIN 1 APPLICATION: 100000 POWDER TOPICAL at 20:49

## 2023-12-15 RX ADMIN — FERROUS SULFATE TAB 325 MG (65 MG ELEMENTAL FE) 1 TABLET: 325 (65 FE) TAB at 08:18

## 2023-12-15 RX ADMIN — GABAPENTIN 600 MG: 300 CAPSULE ORAL at 08:19

## 2023-12-15 RX ADMIN — GABAPENTIN 600 MG: 300 CAPSULE ORAL at 20:48

## 2023-12-15 RX ADMIN — ACETAMINOPHEN 325 MG: 325 TABLET ORAL at 08:18

## 2023-12-15 RX ADMIN — DULOXETINE HYDROCHLORIDE 20 MG: 20 CAPSULE, DELAYED RELEASE ORAL at 20:49

## 2023-12-15 RX ADMIN — LIDOCAINE 1 PATCH: 4 PATCH TOPICAL at 08:19

## 2023-12-15 RX ADMIN — ATENOLOL 25 MG: 25 TABLET ORAL at 20:48

## 2023-12-15 RX ADMIN — NYSTATIN 1 APPLICATION: 100000 POWDER TOPICAL at 08:18

## 2023-12-15 ASSESSMENT — PAIN SCALES - GENERAL
PAINLEVEL_OUTOF10: 0 - NO PAIN
PAINLEVEL_OUTOF10: 0 - NO PAIN

## 2023-12-15 ASSESSMENT — PAIN - FUNCTIONAL ASSESSMENT: PAIN_FUNCTIONAL_ASSESSMENT: 0-10

## 2023-12-15 NOTE — CARE PLAN
The patient's goals for the shift include PATIENT WILL REMAIN SAFE FROM FALLS AND INJURIES DURING THIS SHIFT    The clinical goals for the shift include PT.WILL BE SEEN BY PCP/CONSULTS,RECIEVE PRESCRIBED MEDS/TX'S.

## 2023-12-15 NOTE — CARE PLAN
The patient's goals for the shift include PATIENT WILL REMAIN SAFE FROM FALLS AND INJURIES DURING THIS SHIFT    The clinical goals for the shift include urine will remain free of clots    Pt without bloody urine, safety maintained

## 2023-12-15 NOTE — PROGRESS NOTES
MRN: [id number]  SERVICE DATE:  12/15/2023   SERVICE TIME:  8:53 AM  Today's Date: 12/15/23  Admission Date: 12/8/2023  Hospital Day: #7    Subjective   Patient seen today resting comfortably no significant changes           Objective      ROS:  Unchanged  MEDICATIONS:  Current Facility-Administered Medications   Medication Dose Route Frequency Provider Last Rate Last Admin    acetaminophen (Tylenol) tablet 325 mg  325 mg oral Daily Naa SHAUNA Bartos, APRN-CNP   325 mg at 12/15/23 0818    acetaminophen (Tylenol) tablet 650 mg  650 mg oral q4h PRN KELSEA Meek-CNP   650 mg at 12/11/23 1535    atenolol (Tenormin) tablet 25 mg  25 mg oral Nightly ARMEN Meek   25 mg at 12/14/23 2153    DULoxetine (Cymbalta) DR capsule 20 mg  20 mg oral Nightly KELSEA Meek-CNP   20 mg at 12/14/23 2154    ferrous sulfate (325 mg ferrous sulfate) tablet 1 tablet  65 mg of iron oral Daily with breakfast ARMEN Hoang   1 tablet at 12/15/23 0818    gabapentin (Neurontin) tablet 600 mg  600 mg oral BID Ciro Garcia MD   600 mg at 12/15/23 0819    lidocaine 4 % patch 1 patch  1 patch transdermal Daily ARMEN Hoang   1 patch at 12/15/23 0819    melatonin tablet 3 mg  3 mg oral Nightly PRN ARMEN Meek   3 mg at 12/12/23 1830    morphine injection 1 mg  1 mg intravenous q4h PRN ARMEN Meek        morphine injection 2 mg  2 mg intravenous q4h PRN ARMEN Meek        nystatin (Mycostatin) 100,000 unit/gram powder 1 Application  1 Application Topical BID ARMEN Almodovar   1 Application at 12/15/23 0818    ondansetron ODT (Zofran-ODT) disintegrating tablet 4 mg  4 mg oral q8h PRN ARMEN Meek        Or    ondansetron (Zofran) injection 4 mg  4 mg intravenous q8h PRN Veronica N Carol, APRN-CNP             PHYSICAL EXAM:   /58 (BP Location: Left arm, Patient Position: Lying)   Pulse 64   " Temp 35.6 °C (96.1 °F) (Temporal)   Resp 17   Ht 1.651 m (5' 5\")   Wt 62.4 kg (137 lb 9.6 oz)   SpO2 93%   BMI 22.90 kg/m²    CONSTITUTIONAL - well nourished, well developed, looks like stated age, in no acute distress, not ill-appearing   SKIN - normal skin color and pigmentation, normal skin turgor without rash   HEAD - no trauma, normocephalic  EYES - pupils are equal and reactive to light, extraocular muscles are intact, and normal external exam  ENT - TM's intact, no injection, no signs of infection, uvula midline, normal tongue movement and throat normal  NECK - supple without rigidity, no neck mass was observed, no thyromegaly    CHEST - clear to auscultation, no wheezing, no crackles and no rales, good effort  CARDIAC - regular rate and regular rhythm, no skipped beats, no murmur  ABDOMEN - no organomegaly, soft, nontender, nondistended, normal bowel sounds   NEUROLOGICAL -lethargic    Labs:  Lab Results   Component Value Date    WBC 7.7 12/15/2023    HGB 9.8 (L) 12/15/2023    HCT 32.6 (L) 12/15/2023    MCV 98 12/15/2023     12/15/2023     Lab Results   Component Value Date    GLUCOSE 98 12/15/2023    CALCIUM 8.4 (L) 12/15/2023     12/15/2023    K 4.0 12/15/2023    CO2 27 12/15/2023     12/15/2023    BUN 14 12/15/2023    CREATININE 0.85 12/15/2023   ESR: --No results found for: \"SEDRATE\"No results found for: \"CRP\"  Lab Results   Component Value Date    ALT 7 12/08/2023    AST 14 12/08/2023    ALKPHOS 86 12/08/2023    BILITOT 0.6 12/08/2023                      Problem List Items Addressed This Visit             ICD-10-CM    Anxiety F41.9    Relevant Medications    gabapentin (Neurontin) 600 mg tablet    Supratherapeutic INR R79.1    * (Principal) Hematuria R31.9     Other Visit Diagnoses         Codes    Vaginal bleeding    -  Primary N93.9    Adverse effect of warfarin     T45.515A    Hydronephrosis due to obstruction of bladder     N13.30, N32.0    Hyperkalemia     E87.5    " Acidosis     E87.20    YAA (acute kidney injury) (CMS/HCC)     N17.9    Dermatitis     L30.9    Relevant Medications    nystatin (Mycostatin) 100,000 unit/gram powder          Continue current meds  Check PT/INR  Monitor H&H and obese  Should be satisfactory for discharge              Ciro Garcia MD  This note was created using [employer]. Any inadvertent grammar, spelling or syntax errors are do to voice recognition software error and are not intentional.

## 2023-12-15 NOTE — PROGRESS NOTES
Spiritual Care Visit    Clinical Encounter Type  Visited With: Patient and family together  Routine Visit: Introduction  Continue Visiting: Yes    Faith Encounters  Faith Needs: Prayer         Sacramental Encounters  Communion: Patient wants communion  Communion Given Indicator: No  Sacrament of Sick-Anointing: Anointed     The patient's    last week, and she is filled with grief.  I comforted  her  as  best I  could.                        Taxonomy  Intended Effects: Establish rapport and connectedness, Moraima affirmation, Build relationship of care and support, Demonstrate caring and concern, Journeying with someone in the grief process, Helping someone feel comforted, Promote sense of peace, Lessen someone's feelings of isolation, Convey a calming presence

## 2023-12-15 NOTE — PROGRESS NOTES
Spoke with daughter, Hanna, via phone regarding sNF choices for patient. Family has chosen 3 facilities. 1. [facility] 2. [place]. 3. [facility] . Referrals placed by [facility]. Will await acceptance.     [facility] able to accept. Requested direct precert team to start precert for facility. Daughter Hanna, notified and left message on voicemail.      Cathernie La RN

## 2023-12-16 LAB
ANION GAP SERPL CALC-SCNC: 10 MMOL/L (ref 10–20)
BUN SERPL-MCNC: 13 MG/DL (ref 6–23)
CALCIUM SERPL-MCNC: 8.5 MG/DL (ref 8.6–10.3)
CHLORIDE SERPL-SCNC: 105 MMOL/L (ref 98–107)
CO2 SERPL-SCNC: 29 MMOL/L (ref 21–32)
CREAT SERPL-MCNC: 0.76 MG/DL (ref 0.5–1.05)
ERYTHROCYTE [DISTWIDTH] IN BLOOD BY AUTOMATED COUNT: 15.4 % (ref 11.5–14.5)
GFR SERPL CREATININE-BSD FRML MDRD: 72 ML/MIN/1.73M*2
GLUCOSE SERPL-MCNC: 93 MG/DL (ref 74–99)
HCT VFR BLD AUTO: 33.7 % (ref 36–46)
HGB BLD-MCNC: 10.4 G/DL (ref 12–16)
INR PPP: 1.2 (ref 0.9–1.1)
MCH RBC QN AUTO: 29.9 PG (ref 26–34)
MCHC RBC AUTO-ENTMCNC: 30.9 G/DL (ref 32–36)
MCV RBC AUTO: 97 FL (ref 80–100)
NRBC BLD-RTO: 0 /100 WBCS (ref 0–0)
PLATELET # BLD AUTO: 234 X10*3/UL (ref 150–450)
POTASSIUM SERPL-SCNC: 4.1 MMOL/L (ref 3.5–5.3)
PROTHROMBIN TIME: 13.2 SECONDS (ref 9.8–12.8)
RBC # BLD AUTO: 3.48 X10*6/UL (ref 4–5.2)
SODIUM SERPL-SCNC: 140 MMOL/L (ref 136–145)
WBC # BLD AUTO: 8.3 X10*3/UL (ref 4.4–11.3)

## 2023-12-16 PROCEDURE — 36415 COLL VENOUS BLD VENIPUNCTURE: CPT | Performed by: NURSE PRACTITIONER

## 2023-12-16 PROCEDURE — 1100000001 HC PRIVATE ROOM DAILY

## 2023-12-16 PROCEDURE — 2500000001 HC RX 250 WO HCPCS SELF ADMINISTERED DRUGS (ALT 637 FOR MEDICARE OP): Performed by: NURSE PRACTITIONER

## 2023-12-16 PROCEDURE — 85027 COMPLETE CBC AUTOMATED: CPT | Performed by: NURSE PRACTITIONER

## 2023-12-16 PROCEDURE — 2500000001 HC RX 250 WO HCPCS SELF ADMINISTERED DRUGS (ALT 637 FOR MEDICARE OP): Performed by: INTERNAL MEDICINE

## 2023-12-16 PROCEDURE — 2500000005 HC RX 250 GENERAL PHARMACY W/O HCPCS: Performed by: NURSE PRACTITIONER

## 2023-12-16 PROCEDURE — 94760 N-INVAS EAR/PLS OXIMETRY 1: CPT

## 2023-12-16 PROCEDURE — 85610 PROTHROMBIN TIME: CPT | Performed by: NURSE PRACTITIONER

## 2023-12-16 PROCEDURE — 80048 BASIC METABOLIC PNL TOTAL CA: CPT | Performed by: NURSE PRACTITIONER

## 2023-12-16 RX ADMIN — GABAPENTIN 600 MG: 300 CAPSULE ORAL at 10:05

## 2023-12-16 RX ADMIN — ACETAMINOPHEN 325 MG: 325 TABLET ORAL at 10:05

## 2023-12-16 RX ADMIN — GABAPENTIN 600 MG: 300 CAPSULE ORAL at 20:46

## 2023-12-16 RX ADMIN — FERROUS SULFATE TAB 325 MG (65 MG ELEMENTAL FE) 1 TABLET: 325 (65 FE) TAB at 10:05

## 2023-12-16 RX ADMIN — ACETAMINOPHEN 650 MG: 325 TABLET ORAL at 20:42

## 2023-12-16 RX ADMIN — DULOXETINE HYDROCHLORIDE 20 MG: 20 CAPSULE, DELAYED RELEASE ORAL at 20:45

## 2023-12-16 RX ADMIN — ATENOLOL 25 MG: 25 TABLET ORAL at 20:46

## 2023-12-16 RX ADMIN — LIDOCAINE 1 PATCH: 4 PATCH TOPICAL at 10:05

## 2023-12-16 RX ADMIN — NYSTATIN 1 APPLICATION: 100000 POWDER TOPICAL at 20:51

## 2023-12-16 ASSESSMENT — COGNITIVE AND FUNCTIONAL STATUS - GENERAL
MOVING TO AND FROM BED TO CHAIR: A LITTLE
MOVING FROM LYING ON BACK TO SITTING ON SIDE OF FLAT BED WITH BEDRAILS: A LITTLE
MOVING TO AND FROM BED TO CHAIR: A LITTLE
MOBILITY SCORE: 18
TURNING FROM BACK TO SIDE WHILE IN FLAT BAD: A LITTLE
CLIMB 3 TO 5 STEPS WITH RAILING: A LITTLE
DAILY ACTIVITIY SCORE: 18
TOILETING: A LITTLE
HELP NEEDED FOR BATHING: A LITTLE
STANDING UP FROM CHAIR USING ARMS: A LITTLE
EATING MEALS: A LITTLE
PERSONAL GROOMING: A LITTLE
DAILY ACTIVITIY SCORE: 18
TURNING FROM BACK TO SIDE WHILE IN FLAT BAD: A LITTLE
WALKING IN HOSPITAL ROOM: A LITTLE
WALKING IN HOSPITAL ROOM: A LITTLE
EATING MEALS: A LITTLE
DRESSING REGULAR UPPER BODY CLOTHING: A LITTLE
MOVING FROM LYING ON BACK TO SITTING ON SIDE OF FLAT BED WITH BEDRAILS: A LITTLE
PERSONAL GROOMING: A LITTLE
MOBILITY SCORE: 18
DRESSING REGULAR LOWER BODY CLOTHING: A LITTLE
HELP NEEDED FOR BATHING: A LITTLE
STANDING UP FROM CHAIR USING ARMS: A LITTLE
CLIMB 3 TO 5 STEPS WITH RAILING: A LITTLE
TOILETING: A LITTLE
DRESSING REGULAR LOWER BODY CLOTHING: A LITTLE
DRESSING REGULAR UPPER BODY CLOTHING: A LITTLE

## 2023-12-16 ASSESSMENT — PAIN SCALES - WONG BAKER: WONGBAKER_NUMERICALRESPONSE: HURTS LITTLE BIT

## 2023-12-16 ASSESSMENT — PAIN SCALES - GENERAL
PAINLEVEL_OUTOF10: 7
PAINLEVEL_OUTOF10: 3
PAINLEVEL_OUTOF10: 0 - NO PAIN

## 2023-12-16 ASSESSMENT — PAIN DESCRIPTION - DESCRIPTORS: DESCRIPTORS: ACHING

## 2023-12-16 ASSESSMENT — PAIN - FUNCTIONAL ASSESSMENT
PAIN_FUNCTIONAL_ASSESSMENT: 0-10
PAIN_FUNCTIONAL_ASSESSMENT: 0-10

## 2023-12-16 ASSESSMENT — PAIN SCALES - PAIN ASSESSMENT IN ADVANCED DEMENTIA (PAINAD)
CONSOLABILITY: NO NEED TO CONSOLE
BODYLANGUAGE: RELAXED
FACIALEXPRESSION: SMILING OR INEXPRESSIVE

## 2023-12-16 ASSESSMENT — PAIN DESCRIPTION - LOCATION: LOCATION: SHOULDER

## 2023-12-16 ASSESSMENT — PAIN DESCRIPTION - ORIENTATION: ORIENTATION: LEFT

## 2023-12-16 NOTE — PROGRESS NOTES
MRN: [id number]  SERVICE DATE:  12/16/2023   SERVICE TIME:  8:11 AM  Today's Date: 12/16/23  Admission Date: 12/8/2023  Hospital Day: #8    Subjective   Patient seen today resting comfortably.  Planning and pending discharge           Objective      ROS:  Patient sleeping no complaints     MEDICATIONS:  Current Facility-Administered Medications   Medication Dose Route Frequency Provider Last Rate Last Admin    acetaminophen (Tylenol) tablet 325 mg  325 mg oral Daily Naa M Bartos, APRN-CNP   325 mg at 12/15/23 0818    acetaminophen (Tylenol) tablet 650 mg  650 mg oral q4h PRN KELSEA Meek-CNP   650 mg at 12/11/23 1535    atenolol (Tenormin) tablet 25 mg  25 mg oral Nightly ARMEN Meek   25 mg at 12/15/23 2048    DULoxetine (Cymbalta) DR capsule 20 mg  20 mg oral Nightly ARMEN Meek   20 mg at 12/15/23 2049    ferrous sulfate (325 mg ferrous sulfate) tablet 1 tablet  65 mg of iron oral Daily with breakfast ARMEN Hoang   1 tablet at 12/15/23 0818    gabapentin (Neurontin) tablet 600 mg  600 mg oral BID Ciro Garcia MD   600 mg at 12/15/23 2048    lidocaine 4 % patch 1 patch  1 patch transdermal Daily ARMEN Hoang   1 patch at 12/15/23 0819    melatonin tablet 3 mg  3 mg oral Nightly PRN ARMEN Meek   3 mg at 12/12/23 1830    morphine injection 1 mg  1 mg intravenous q4h PRN ARMEN Meek        morphine injection 2 mg  2 mg intravenous q4h PRN ARMEN Meek        nystatin (Mycostatin) 100,000 unit/gram powder 1 Application  1 Application Topical BID ARMEN Almodovar   1 Application at 12/15/23 2049    ondansetron ODT (Zofran-ODT) disintegrating tablet 4 mg  4 mg oral q8h PRN ARMEN Meek        Or    ondansetron (Zofran) injection 4 mg  4 mg intravenous q8h PRN Veronica N Carol, APRN-CNP             PHYSICAL EXAM:   /65   Pulse 63   Temp 35.6 °C  "(96.1 °F)   Resp 20   Ht 1.651 m (5' 5\")   Wt 62.4 kg (137 lb 9.6 oz)   SpO2 94%   BMI 22.90 kg/m²      CONSTITUTIONAL - well nourished, well developed, looks like stated age, in no acute distress, not ill-appearing   SKIN - normal skin color and pigmentation, normal skin turgor without rash   HEAD - no trauma, normocephalic  EYES - pupils are equal and reactive to light, extraocular muscles are intact, and normal external exam  ENT - TM's intact, no injection, no signs of infection, uvula midline, normal tongue movement and throat normal  NECK - supple without rigidity, no neck mass was observed, no thyromegaly    CHEST - clear to auscultation, no wheezing, no crackles and no rales, good effort  CARDIAC - regular rate and regular rhythm, no skipped beats, no murmur  ABDOMEN - no organomegaly, soft, nontender, nondistended, normal bowel sounds   NEUROLOGICAL -sleepy slow to answer questions generalized muscular weakness  PSYCHIATRIC - alert, pleasant and cordial, age-appropriate  IMMUNOLOGIC - no cervical lymphadenopathy       Labs:  Lab Results   Component Value Date    WBC 8.3 12/16/2023    HGB 10.4 (L) 12/16/2023    HCT 33.7 (L) 12/16/2023    MCV 97 12/16/2023     12/16/2023     Lab Results   Component Value Date    GLUCOSE 98 12/15/2023    CALCIUM 8.4 (L) 12/15/2023     12/15/2023    K 4.0 12/15/2023    CO2 27 12/15/2023     12/15/2023    BUN 14 12/15/2023    CREATININE 0.85 12/15/2023   ESR: --No results found for: \"SEDRATE\"No results found for: \"CRP\"  Lab Results   Component Value Date    ALT 7 12/08/2023    AST 14 12/08/2023    ALKPHOS 86 12/08/2023    BILITOT 0.6 12/08/2023                      Problem List Items Addressed This Visit             ICD-10-CM    Anxiety F41.9    Relevant Medications    gabapentin (Neurontin) 600 mg tablet    Supratherapeutic INR R79.1    * (Principal) Hematuria R31.9     Other Visit Diagnoses         Codes    Vaginal bleeding    -  Primary N93.9    " Adverse effect of warfarin     T45.515A    Hydronephrosis due to obstruction of bladder     N13.30, N32.0    Hyperkalemia     E87.5    Acidosis     E87.20    YAA (acute kidney injury) (CMS/HCC)     N17.9    Dermatitis     L30.9    Relevant Medications    nystatin (Mycostatin) 100,000 unit/gram powder          Discharge pending  H&H stable  Monitor anticoagulation status              Ciro Garcia MD  This note was created using [employer]. Any inadvertent grammar, spelling or syntax errors are do to voice recognition software error and are not intentional.

## 2023-12-16 NOTE — CARE PLAN
The patient's goals for the shift include PATIENT WILL REMAIN SAFE FROM FALLS AND INJURIES DURING THIS SHIFT    The clinical goals for the shift include completed    Over the shift, the patient did not make progress toward the following goals. Barriers to progression include . Recommendations to address these barriers include .

## 2023-12-17 LAB
ANION GAP SERPL CALC-SCNC: 12 MMOL/L (ref 10–20)
BUN SERPL-MCNC: 14 MG/DL (ref 6–23)
CALCIUM SERPL-MCNC: 8.8 MG/DL (ref 8.6–10.3)
CHLORIDE SERPL-SCNC: 102 MMOL/L (ref 98–107)
CO2 SERPL-SCNC: 26 MMOL/L (ref 21–32)
CREAT SERPL-MCNC: 0.86 MG/DL (ref 0.5–1.05)
ERYTHROCYTE [DISTWIDTH] IN BLOOD BY AUTOMATED COUNT: 15.2 % (ref 11.5–14.5)
GFR SERPL CREATININE-BSD FRML MDRD: 62 ML/MIN/1.73M*2
GLUCOSE SERPL-MCNC: 107 MG/DL (ref 74–99)
HCT VFR BLD AUTO: 39 % (ref 36–46)
HGB BLD-MCNC: 12 G/DL (ref 12–16)
INR PPP: 1.2 (ref 0.9–1.1)
MCH RBC QN AUTO: 29.8 PG (ref 26–34)
MCHC RBC AUTO-ENTMCNC: 30.8 G/DL (ref 32–36)
MCV RBC AUTO: 97 FL (ref 80–100)
NRBC BLD-RTO: 0 /100 WBCS (ref 0–0)
PLATELET # BLD AUTO: 275 X10*3/UL (ref 150–450)
POTASSIUM SERPL-SCNC: 4.4 MMOL/L (ref 3.5–5.3)
PROTHROMBIN TIME: 13.6 SECONDS (ref 9.8–12.8)
RBC # BLD AUTO: 4.03 X10*6/UL (ref 4–5.2)
SODIUM SERPL-SCNC: 136 MMOL/L (ref 136–145)
WBC # BLD AUTO: 8 X10*3/UL (ref 4.4–11.3)

## 2023-12-17 PROCEDURE — 2500000001 HC RX 250 WO HCPCS SELF ADMINISTERED DRUGS (ALT 637 FOR MEDICARE OP): Performed by: INTERNAL MEDICINE

## 2023-12-17 PROCEDURE — 36415 COLL VENOUS BLD VENIPUNCTURE: CPT | Performed by: NURSE PRACTITIONER

## 2023-12-17 PROCEDURE — 1100000001 HC PRIVATE ROOM DAILY

## 2023-12-17 PROCEDURE — 2500000005 HC RX 250 GENERAL PHARMACY W/O HCPCS: Performed by: NURSE PRACTITIONER

## 2023-12-17 PROCEDURE — 94760 N-INVAS EAR/PLS OXIMETRY 1: CPT

## 2023-12-17 PROCEDURE — 2500000001 HC RX 250 WO HCPCS SELF ADMINISTERED DRUGS (ALT 637 FOR MEDICARE OP): Performed by: NURSE PRACTITIONER

## 2023-12-17 PROCEDURE — 85027 COMPLETE CBC AUTOMATED: CPT | Performed by: NURSE PRACTITIONER

## 2023-12-17 PROCEDURE — 85610 PROTHROMBIN TIME: CPT | Performed by: NURSE PRACTITIONER

## 2023-12-17 PROCEDURE — 80048 BASIC METABOLIC PNL TOTAL CA: CPT | Performed by: NURSE PRACTITIONER

## 2023-12-17 RX ORDER — LIDOCAINE 560 MG/1
1 PATCH PERCUTANEOUS; TOPICAL; TRANSDERMAL DAILY
Status: DISCONTINUED | OUTPATIENT
Start: 2023-12-17 | End: 2023-12-18 | Stop reason: HOSPADM

## 2023-12-17 RX ADMIN — ACETAMINOPHEN 325 MG: 325 TABLET ORAL at 09:22

## 2023-12-17 RX ADMIN — FERROUS SULFATE TAB 325 MG (65 MG ELEMENTAL FE) 1 TABLET: 325 (65 FE) TAB at 09:22

## 2023-12-17 RX ADMIN — DULOXETINE HYDROCHLORIDE 20 MG: 20 CAPSULE, DELAYED RELEASE ORAL at 20:24

## 2023-12-17 RX ADMIN — GABAPENTIN 600 MG: 300 CAPSULE ORAL at 09:22

## 2023-12-17 RX ADMIN — ATENOLOL 25 MG: 25 TABLET ORAL at 20:24

## 2023-12-17 RX ADMIN — LIDOCAINE 1 PATCH: 4 PATCH TOPICAL at 09:22

## 2023-12-17 RX ADMIN — ACETAMINOPHEN 650 MG: 325 TABLET ORAL at 20:24

## 2023-12-17 RX ADMIN — GABAPENTIN 600 MG: 300 CAPSULE ORAL at 20:24

## 2023-12-17 RX ADMIN — NYSTATIN 1 APPLICATION: 100000 POWDER TOPICAL at 20:25

## 2023-12-17 ASSESSMENT — COGNITIVE AND FUNCTIONAL STATUS - GENERAL
TOILETING: A LOT
STANDING UP FROM CHAIR USING ARMS: A LOT
MOBILITY SCORE: 12
STANDING UP FROM CHAIR USING ARMS: A LOT
DRESSING REGULAR UPPER BODY CLOTHING: A LOT
DAILY ACTIVITIY SCORE: 13
DRESSING REGULAR LOWER BODY CLOTHING: A LOT
TOILETING: A LOT
CLIMB 3 TO 5 STEPS WITH RAILING: A LOT
MOVING FROM LYING ON BACK TO SITTING ON SIDE OF FLAT BED WITH BEDRAILS: A LOT
TURNING FROM BACK TO SIDE WHILE IN FLAT BAD: A LOT
DAILY ACTIVITIY SCORE: 12
TURNING FROM BACK TO SIDE WHILE IN FLAT BAD: A LOT
EATING MEALS: A LITTLE
MOVING TO AND FROM BED TO CHAIR: A LOT
WALKING IN HOSPITAL ROOM: A LOT
DRESSING REGULAR UPPER BODY CLOTHING: A LOT
DRESSING REGULAR LOWER BODY CLOTHING: A LOT
PERSONAL GROOMING: A LOT
PERSONAL GROOMING: A LOT
HELP NEEDED FOR BATHING: A LOT
WALKING IN HOSPITAL ROOM: A LOT
EATING MEALS: A LOT
CLIMB 3 TO 5 STEPS WITH RAILING: A LOT
HELP NEEDED FOR BATHING: A LOT
MOVING FROM LYING ON BACK TO SITTING ON SIDE OF FLAT BED WITH BEDRAILS: A LOT
MOBILITY SCORE: 12
MOVING TO AND FROM BED TO CHAIR: A LOT

## 2023-12-17 ASSESSMENT — PAIN - FUNCTIONAL ASSESSMENT: PAIN_FUNCTIONAL_ASSESSMENT: 0-10

## 2023-12-17 ASSESSMENT — PAIN SCALES - GENERAL
PAINLEVEL_OUTOF10: 0 - NO PAIN
PAINLEVEL_OUTOF10: 3

## 2023-12-17 ASSESSMENT — PAIN DESCRIPTION - ORIENTATION: ORIENTATION: RIGHT

## 2023-12-17 NOTE — CARE PLAN
Problem: Skin  Goal: Participates in plan/prevention/treatment measures  12/17/2023 0632 by Jasmyne Johnson RN  Outcome: Progressing  Flowsheets (Taken 12/11/2023 1626 by Ciara Horn RN)  Participates in plan/prevention/treatment measures:   Elevate heels   Increase activity/out of bed for meals   Discuss with provider PT/OT consult  12/17/2023 0629 by Jasmyne Johnson RN  Outcome: Progressing  Goal: Prevent/manage excess moisture  Outcome: Progressing  Goal: Prevent/minimize sheer/friction injuries  Outcome: Progressing  Goal: Promote/optimize nutrition  Outcome: Progressing  Goal: Promote skin healing  Outcome: Progressing     Problem: Acute Kidney Failure  Goal: Electrolytes/labs return to normal range  Outcome: Progressing  Flowsheets (Taken 12/17/2023 0629)  Electrolytes/labs return to normal range: Monitor VS, I&O, weight, labs  Goal: No signs of infection  Outcome: Progressing  Goal: Stable weight and I&O  Outcome: Progressing     Problem: Pain - Adult  Goal: Verbalizes/displays adequate comfort level or baseline comfort level  Outcome: Progressing     Problem: Safety - Adult  Goal: Free from fall injury  Outcome: Progressing     Problem: Chronic Conditions and Co-morbidities  Goal: Patient's chronic conditions and co-morbidity symptoms are monitored and maintained or improved  Outcome: Progressing   The patient's goals for the shift include PATIENT WILL REMAIN SAFE FROM FALLS AND INJURIES DURING THIS SHIFT    The clinical goals for the shift include continue to be free from hematuria,no falls this shift.n

## 2023-12-17 NOTE — CARE PLAN
The patient's goals for the shift include PATIENT WILL REMAIN SAFE FROM FALLS AND INJURIES DURING THIS SHIFT    The clinical goals for the shift include continue to be free from hematuria,no falls this shift.n    Over the shift, the patient did not make progress toward the following goals. Barriers to progression   Problem: Skin  Goal: Participates in plan/prevention/treatment measures  Outcome: Progressing  Goal: Prevent/manage excess moisture  Outcome: Progressing  Goal: Prevent/minimize sheer/friction injuries  Outcome: Progressing  Goal: Promote/optimize nutrition  Outcome: Progressing  Goal: Promote skin healing  Outcome: Progressing     Problem: Acute Kidney Failure  Goal: Electrolytes/labs return to normal range  Outcome: Progressing  Flowsheets (Taken 12/17/2023 5142)  Electrolytes/labs return to normal range: Monitor VS, I&O, weight, labs  Goal: No signs of infection  Outcome: Progressing  Goal: Stable weight and I&O  Outcome: Progressing     Problem: Pain - Adult  Goal: Verbalizes/displays adequate comfort level or baseline comfort level  Outcome: Progressing     Problem: Safety - Adult  Goal: Free from fall injury  Outcome: Progressing     Problem: Chronic Conditions and Co-morbidities  Goal: Patient's chronic conditions and co-morbidity symptoms are monitored and maintained or improved  Outcome: Progressing

## 2023-12-17 NOTE — NURSING NOTE
Pt received via bed from 3117. Report called from Kathy THOMAS. Pt awake alert and oriented upon arrival to floor.

## 2023-12-17 NOTE — CARE PLAN
The patient's goals for the shift include PATIENT WILL REMAIN SAFE FROM FALLS AND INJURIES DURING THIS SHIFT    The clinical goals for the shift include completed    Pt remained safe and injury free today. Has little appetite. Tearful re: recent passing of . Awaiting pre cert for transfer to [facility].

## 2023-12-17 NOTE — PROGRESS NOTES
MRN: [id number]  SERVICE DATE:  12/17/2023   SERVICE TIME:  8:49 AM  Today's Date: 12/17/23  Admission Date: 12/8/2023  Hospital Day: #9    Subjective      Patient seen today more awake.  Asking for some orange juice.  I did inform her that as soon as we get pre-CERT she will be satisfactory for discharge        Objective      ROS:   General: Denies any change in weight, fever, chills, fatigue.   Head and Neck: Denies any headaches, syncope or history of head injury.   Eyes/Ears: Denies any cataracts, glaucoma, blurred vision, tinnitus.   Nose: Denies any epistaxis or sinus problems   Respiratory: Denies any cough, hemoptysis, wheezing, asthma, dyspnea.   Cardiovascular: No orthopnea, dyspnea, palpitations, congestive heart failure.   Gastrointestinal: Denies any indigestion, nausea, vomiting, diarrhea, constipation.   Neuro: No dizzyness, headache or syncope   ID: No fever or chills.   Endocrine: No weight loss or weight gain.  No thyroid or diabetic complaints     MEDICATIONS:  Current Facility-Administered Medications   Medication Dose Route Frequency Provider Last Rate Last Admin    acetaminophen (Tylenol) tablet 325 mg  325 mg oral Daily ARMEN Hoang   325 mg at 12/16/23 1005    acetaminophen (Tylenol) tablet 650 mg  650 mg oral q4h PRN ARMEN Meek   650 mg at 12/16/23 2042    atenolol (Tenormin) tablet 25 mg  25 mg oral Nightly ARMEN Meek   25 mg at 12/16/23 2046    DULoxetine (Cymbalta) DR capsule 20 mg  20 mg oral Nightly ARMEN Meek   20 mg at 12/16/23 2045    ferrous sulfate (325 mg ferrous sulfate) tablet 1 tablet  65 mg of iron oral Daily with breakfast ARMEN Hoang   1 tablet at 12/16/23 1005    gabapentin (Neurontin) tablet 600 mg  600 mg oral BID Ciro Garcia MD   600 mg at 12/16/23 2046    lidocaine 4 % patch 1 patch  1 patch transdermal Daily ARMEN Hoang   1 patch at 12/16/23 1005    melatonin tablet 3 mg  " 3 mg oral Nightly PRN ARMEN Meek   3 mg at 12/12/23 1830    morphine injection 1 mg  1 mg intravenous q4h PRN ARMEN Meek        morphine injection 2 mg  2 mg intravenous q4h PRN ARMEN Meek        nystatin (Mycostatin) 100,000 unit/gram powder 1 Application  1 Application Topical BID ARMEN Almodovar   1 Application at 12/16/23 2051    ondansetron ODT (Zofran-ODT) disintegrating tablet 4 mg  4 mg oral q8h PRN ARMEN Meek        Or    ondansetron (Zofran) injection 4 mg  4 mg intravenous q8h PRN ARMEN Meek             PHYSICAL EXAM:   BP (!) 178/96   Pulse 78   Temp 36.3 °C (97.3 °F)   Resp 15   Ht 1.651 m (5' 5\")   Wt 62.4 kg (137 lb 9.6 oz)   SpO2 94%   BMI 22.90 kg/m²      CONSTITUTIONAL - well nourished, well developed, looks like stated age, in no acute distress, not ill-appearing   SKIN - normal skin color and pigmentation, normal skin turgor without rash   HEAD - no trauma, normocephalic  EYES - pupils are equal and reactive to light, extraocular muscles are intact, and normal external exam  ENT - TM's intact, no injection, no signs of infection, uvula midline, normal tongue movement and throat normal  NECK - supple without rigidity, no neck mass was observed, no thyromegaly    CHEST - clear to auscultation, no wheezing, no crackles and no rales, good effort  CARDIAC - regular rate and regular rhythm, no skipped beats, no murmur  ABDOMEN - no organomegaly, soft, nontender, nondistended, normal bowel sounds   NEUROLOGICAL - normal gait, normal balance, normal motor, no ataxia, DTRs equal and symmetrical; alert, oriented and no focal signs  PSYCHIATRIC - alert, pleasant and cordial, age-appropriate  IMMUNOLOGIC - no cervical lymphadenopathy       Labs:  Lab Results   Component Value Date    WBC 8.3 12/16/2023    HGB 10.4 (L) 12/16/2023    HCT 33.7 (L) 12/16/2023    MCV 97 12/16/2023     " "12/16/2023     Lab Results   Component Value Date    GLUCOSE 93 12/16/2023    CALCIUM 8.5 (L) 12/16/2023     12/16/2023    K 4.1 12/16/2023    CO2 29 12/16/2023     12/16/2023    BUN 13 12/16/2023    CREATININE 0.76 12/16/2023   ESR: --No results found for: \"SEDRATE\"No results found for: \"CRP\"  Lab Results   Component Value Date    ALT 7 12/08/2023    AST 14 12/08/2023    ALKPHOS 86 12/08/2023    BILITOT 0.6 12/08/2023                      Problem List Items Addressed This Visit             ICD-10-CM    Anxiety F41.9    Relevant Medications    gabapentin (Neurontin) 600 mg tablet    Supratherapeutic INR R79.1    * (Principal) Hematuria R31.9     Other Visit Diagnoses         Codes    Vaginal bleeding    -  Primary N93.9    Adverse effect of warfarin     T45.515A    Hydronephrosis due to obstruction of bladder     N13.30, N32.0    Hyperkalemia     E87.5    Acidosis     E87.20    YAA (acute kidney injury) (CMS/HCC)     N17.9    Dermatitis     L30.9    Relevant Medications    nystatin (Mycostatin) 100,000 unit/gram powder          H&H stable  Monitor anticoagulation status  Nonspecific lower abdominal pain is resolved  Awaiting bed placement              Ciro Garcia MD  This note was created using [employer]. Any inadvertent grammar, spelling or syntax errors are do to voice recognition software error and are not intentional.      "

## 2023-12-18 VITALS
TEMPERATURE: 98.8 F | BODY MASS INDEX: 22.92 KG/M2 | RESPIRATION RATE: 17 BRPM | DIASTOLIC BLOOD PRESSURE: 65 MMHG | OXYGEN SATURATION: 94 % | HEART RATE: 78 BPM | WEIGHT: 137.6 LBS | SYSTOLIC BLOOD PRESSURE: 137 MMHG | HEIGHT: 65 IN

## 2023-12-18 LAB
ANION GAP SERPL CALC-SCNC: 10 MMOL/L (ref 10–20)
APPEARANCE UR: ABNORMAL
BACTERIA #/AREA URNS AUTO: ABNORMAL /HPF
BILIRUB UR STRIP.AUTO-MCNC: NEGATIVE MG/DL
BUN SERPL-MCNC: 21 MG/DL (ref 6–23)
CALCIUM SERPL-MCNC: 8 MG/DL (ref 8.6–10.3)
CHLORIDE SERPL-SCNC: 103 MMOL/L (ref 98–107)
CO2 SERPL-SCNC: 26 MMOL/L (ref 21–32)
COLOR UR: YELLOW
CREAT SERPL-MCNC: 1.18 MG/DL (ref 0.5–1.05)
ERYTHROCYTE [DISTWIDTH] IN BLOOD BY AUTOMATED COUNT: 15.2 % (ref 11.5–14.5)
GFR SERPL CREATININE-BSD FRML MDRD: 43 ML/MIN/1.73M*2
GLUCOSE SERPL-MCNC: 113 MG/DL (ref 74–99)
GLUCOSE UR STRIP.AUTO-MCNC: NEGATIVE MG/DL
HCT VFR BLD AUTO: 33.9 % (ref 36–46)
HGB BLD-MCNC: 10.5 G/DL (ref 12–16)
HOLD SPECIMEN: NORMAL
INR PPP: 1.3 (ref 0.9–1.1)
KETONES UR STRIP.AUTO-MCNC: ABNORMAL MG/DL
LEUKOCYTE ESTERASE UR QL STRIP.AUTO: ABNORMAL
MCH RBC QN AUTO: 29.9 PG (ref 26–34)
MCHC RBC AUTO-ENTMCNC: 31 G/DL (ref 32–36)
MCV RBC AUTO: 97 FL (ref 80–100)
NITRITE UR QL STRIP.AUTO: NEGATIVE
NRBC BLD-RTO: 0 /100 WBCS (ref 0–0)
PH UR STRIP.AUTO: 7 [PH]
PLATELET # BLD AUTO: 244 X10*3/UL (ref 150–450)
POTASSIUM SERPL-SCNC: 3.9 MMOL/L (ref 3.5–5.3)
PROT UR STRIP.AUTO-MCNC: ABNORMAL MG/DL
PROTHROMBIN TIME: 14.3 SECONDS (ref 9.8–12.8)
RBC # BLD AUTO: 3.51 X10*6/UL (ref 4–5.2)
RBC # UR STRIP.AUTO: ABNORMAL /UL
RBC #/AREA URNS AUTO: ABNORMAL /HPF
SODIUM SERPL-SCNC: 135 MMOL/L (ref 136–145)
SP GR UR STRIP.AUTO: 1.02
SQUAMOUS #/AREA URNS AUTO: ABNORMAL /HPF
UROBILINOGEN UR STRIP.AUTO-MCNC: 4 MG/DL
WBC # BLD AUTO: 9.5 X10*3/UL (ref 4.4–11.3)
WBC #/AREA URNS AUTO: >50 /HPF
YEAST BUDDING #/AREA UR COMP ASSIST: PRESENT /HPF

## 2023-12-18 PROCEDURE — 80048 BASIC METABOLIC PNL TOTAL CA: CPT | Performed by: NURSE PRACTITIONER

## 2023-12-18 PROCEDURE — 97530 THERAPEUTIC ACTIVITIES: CPT | Mod: GP,CQ

## 2023-12-18 PROCEDURE — 36415 COLL VENOUS BLD VENIPUNCTURE: CPT | Performed by: NURSE PRACTITIONER

## 2023-12-18 PROCEDURE — 85610 PROTHROMBIN TIME: CPT | Performed by: NURSE PRACTITIONER

## 2023-12-18 PROCEDURE — 2500000001 HC RX 250 WO HCPCS SELF ADMINISTERED DRUGS (ALT 637 FOR MEDICARE OP): Performed by: INTERNAL MEDICINE

## 2023-12-18 PROCEDURE — 81001 URINALYSIS AUTO W/SCOPE: CPT | Performed by: INTERNAL MEDICINE

## 2023-12-18 PROCEDURE — 97535 SELF CARE MNGMENT TRAINING: CPT | Mod: GO,CO

## 2023-12-18 PROCEDURE — 2500000005 HC RX 250 GENERAL PHARMACY W/O HCPCS: Performed by: NURSE PRACTITIONER

## 2023-12-18 PROCEDURE — 85027 COMPLETE CBC AUTOMATED: CPT | Performed by: NURSE PRACTITIONER

## 2023-12-18 PROCEDURE — 2500000001 HC RX 250 WO HCPCS SELF ADMINISTERED DRUGS (ALT 637 FOR MEDICARE OP): Performed by: NURSE PRACTITIONER

## 2023-12-18 RX ORDER — GABAPENTIN 300 MG/1
300 CAPSULE ORAL 2 TIMES DAILY
Start: 2023-12-18

## 2023-12-18 RX ORDER — GABAPENTIN 300 MG/1
300 CAPSULE ORAL 2 TIMES DAILY
Status: DISCONTINUED | OUTPATIENT
Start: 2023-12-18 | End: 2023-12-18 | Stop reason: HOSPADM

## 2023-12-18 RX ADMIN — ACETAMINOPHEN 325 MG: 325 TABLET ORAL at 08:39

## 2023-12-18 RX ADMIN — GABAPENTIN 600 MG: 300 CAPSULE ORAL at 08:39

## 2023-12-18 RX ADMIN — LIDOCAINE 1 PATCH: 4 PATCH TOPICAL at 08:39

## 2023-12-18 RX ADMIN — LIDOCAINE 1 PATCH: 4 PATCH TOPICAL at 08:40

## 2023-12-18 RX ADMIN — FERROUS SULFATE TAB 325 MG (65 MG ELEMENTAL FE) 1 TABLET: 325 (65 FE) TAB at 08:39

## 2023-12-18 ASSESSMENT — COGNITIVE AND FUNCTIONAL STATUS - GENERAL
DRESSING REGULAR UPPER BODY CLOTHING: A LITTLE
CLIMB 3 TO 5 STEPS WITH RAILING: A LOT
DRESSING REGULAR LOWER BODY CLOTHING: A LOT
MOVING FROM LYING ON BACK TO SITTING ON SIDE OF FLAT BED WITH BEDRAILS: A LOT
MOBILITY SCORE: 12
HELP NEEDED FOR BATHING: A LOT
TOILETING: A LOT
TURNING FROM BACK TO SIDE WHILE IN FLAT BAD: A LOT
EATING MEALS: A LITTLE
DAILY ACTIVITIY SCORE: 15
PERSONAL GROOMING: A LITTLE
WALKING IN HOSPITAL ROOM: A LOT
MOVING TO AND FROM BED TO CHAIR: A LOT
STANDING UP FROM CHAIR USING ARMS: A LOT

## 2023-12-18 ASSESSMENT — ACTIVITIES OF DAILY LIVING (ADL)
HOME_MANAGEMENT_TIME_ENTRY: 30
BATHING_LEVEL_OF_ASSISTANCE: MAXIMUM ASSISTANCE
EFFECT OF PAIN ON DAILY ACTIVITIES: .

## 2023-12-18 ASSESSMENT — PAIN - FUNCTIONAL ASSESSMENT: PAIN_FUNCTIONAL_ASSESSMENT: 0-10

## 2023-12-18 ASSESSMENT — PAIN SCALES - GENERAL: PAINLEVEL_OUTOF10: 0 - NO PAIN

## 2023-12-18 NOTE — PROGRESS NOTES
Requested precert escalation for [facility].  0948 Precert obtained for [facility]. Requested [facility] send GF and 7000.  1400 Transport scheduled for 1530. Facility and daughter Hanna notified.       Karly Stephens RN

## 2023-12-18 NOTE — PROGRESS NOTES
MRN: [id number]  SERVICE DATE:  12/18/2023   SERVICE TIME:  9:02 AM  Today's Date: 12/18/23  Admission Date: 12/8/2023  Hospital Day: #10    Subjective      Patient seen today resting comfortably.  No significant changes she is awake and conversant awaiting pre-CERT        Objective      ROS:   General: Denies any change in weight, fever, chills, fatigue.   Head and Neck: Denies any headaches, syncope or history of head injury.   Eyes/Ears: Denies any cataracts, glaucoma, blurred vision, tinnitus.   Nose: Denies any epistaxis or sinus problems   Respiratory: Denies any cough, hemoptysis, wheezing, asthma, dyspnea.   Cardiovascular: No orthopnea, dyspnea, palpitations, congestive heart failure.   Gastrointestinal: Denies any indigestion, nausea, vomiting, diarrhea, constipation.   Neuro: No dizzyness, headache or syncope   ID: No fever or chills.   Endocrine: No weight loss or weight gain.  No thyroid or diabetic complaints     MEDICATIONS:  Current Facility-Administered Medications   Medication Dose Route Frequency Provider Last Rate Last Admin    acetaminophen (Tylenol) tablet 325 mg  325 mg oral Daily ARMEN Hoang   325 mg at 12/18/23 0839    acetaminophen (Tylenol) tablet 650 mg  650 mg oral q4h PRN ARMEN Meek   650 mg at 12/17/23 2024    atenolol (Tenormin) tablet 25 mg  25 mg oral Nightly ARMEN Meek   25 mg at 12/17/23 2024    DULoxetine (Cymbalta) DR capsule 20 mg  20 mg oral Nightly ARMEN Meek   20 mg at 12/17/23 2024    ferrous sulfate (325 mg ferrous sulfate) tablet 1 tablet  65 mg of iron oral Daily with breakfast ARMEN Hoang   1 tablet at 12/18/23 0839    gabapentin (Neurontin) capsule 600 mg  600 mg oral BID Ciro Garcia MD   600 mg at 12/18/23 0839    lidocaine 4 % patch 1 patch  1 patch transdermal Daily ARMEN Hoang   1 patch at 12/18/23 0839    lidocaine 4 % patch 1 patch  1 patch transdermal Daily  "ARMEN Meek   1 patch at 12/18/23 0840    melatonin tablet 3 mg  3 mg oral Nightly PRN ARMEN Meek   3 mg at 12/12/23 1830    nystatin (Mycostatin) 100,000 unit/gram powder 1 Application  1 Application Topical BID ARMEN Almodovar   1 Application at 12/17/23 2025    ondansetron ODT (Zofran-ODT) disintegrating tablet 4 mg  4 mg oral q8h PRN ARMEN Meek        Or    ondansetron (Zofran) injection 4 mg  4 mg intravenous q8h PRN ARMEN Meek             PHYSICAL EXAM:   /63   Pulse 64   Temp 37.3 °C (99.1 °F) (Temporal)   Resp 20   Ht 1.651 m (5' 5\")   Wt 62.4 kg (137 lb 9.6 oz)   SpO2 97%   BMI 22.90 kg/m²      CONSTITUTIONAL - well nourished, well developed, looks like stated age, in no acute distress, not ill-appearing   SKIN - normal skin color and pigmentation, normal skin turgor without rash   HEAD - no trauma, normocephalic  EYES - pupils are equal and reactive to light, extraocular muscles are intact, and normal external exam  ENT - TM's intact, no injection, no signs of infection, uvula midline, normal tongue movement and throat normal  NECK - supple without rigidity, no neck mass was observed, no thyromegaly    CHEST - clear to auscultation, no wheezing, no crackles and no rales, good effort  CARDIAC - regular rate and regular rhythm, no skipped beats, no murmur  ABDOMEN - no organomegaly, soft, nontender, nondistended, normal bowel sounds   NEUROLOGICAL - normal gait, normal balance, normal motor, no ataxia, DTRs equal and symmetrical; alert, oriented and no focal signs  PSYCHIATRIC - alert, pleasant and cordial, age-appropriate  IMMUNOLOGIC - no cervical lymphadenopathy       Labs:  Lab Results   Component Value Date    WBC 9.5 12/18/2023    HGB 10.5 (L) 12/18/2023    HCT 33.9 (L) 12/18/2023    MCV 97 12/18/2023     12/18/2023     Lab Results   Component Value Date    GLUCOSE 113 (H) 12/18/2023    " "CALCIUM 8.0 (L) 12/18/2023     (L) 12/18/2023    K 3.9 12/18/2023    CO2 26 12/18/2023     12/18/2023    BUN 21 12/18/2023    CREATININE 1.18 (H) 12/18/2023   ESR: --No results found for: \"SEDRATE\"No results found for: \"CRP\"  Lab Results   Component Value Date    ALT 7 12/08/2023    AST 14 12/08/2023    ALKPHOS 86 12/08/2023    BILITOT 0.6 12/08/2023                      Problem List Items Addressed This Visit             ICD-10-CM    Anxiety F41.9    Relevant Medications    gabapentin (Neurontin) 600 mg tablet    Supratherapeutic INR R79.1    * (Principal) Hematuria R31.9     Other Visit Diagnoses         Codes    Vaginal bleeding    -  Primary N93.9    Adverse effect of warfarin     T45.515A    Hydronephrosis due to obstruction of bladder     N13.30, N32.0    Hyperkalemia     E87.5    Acidosis     E87.20    YAA (acute kidney injury) (CMS/HCC)     N17.9    Dermatitis     L30.9    Relevant Medications    nystatin (Mycostatin) 100,000 unit/gram powder          Continue current treatment  H&H stable  Recheck electrolytes  Awaiting precertification              Ciro Garcia MD  This note was created using [employer]. Any inadvertent grammar, spelling or syntax errors are do to voice recognition software error and are not intentional.    "

## 2023-12-18 NOTE — CARE PLAN
The patient's goals for the shift include PATIENT WILL REMAIN SAFE FROM FALLS AND INJURIES DURING THIS SHIFT    The clinical goals for the shift include pt to sit in the chair for meals & will monitor urine output      Problem: Discharge Barriers  Goal: My discharge needs are met  Outcome: Progressing     Problem: Skin  Goal: Decreased wound size/increased tissue granulation at next dressing change  Outcome: Progressing  Flowsheets (Taken 12/18/2023 1235)  Decreased wound size/increased tissue granulation at next dressing change: Promote sleep for wound healing  Goal: Participates in plan/prevention/treatment measures  Outcome: Progressing  Flowsheets (Taken 12/18/2023 1235)  Participates in plan/prevention/treatment measures:   Elevate heels   Increase activity/out of bed for meals  Goal: Prevent/manage excess moisture  Outcome: Progressing  Flowsheets (Taken 12/18/2023 1235)  Prevent/manage excess moisture:   Cleanse incontinence/protect with barrier cream   Monitor for/manage infection if present  Goal: Prevent/minimize sheer/friction injuries  Outcome: Progressing  Flowsheets (Taken 12/18/2023 1235)  Prevent/minimize sheer/friction injuries:   HOB 30 degrees or less   Use pull sheet   Turn/reposition every 2 hours/use positioning/transfer devices   Increase activity/out of bed for meals  Goal: Promote/optimize nutrition  Outcome: Progressing  Flowsheets (Taken 12/18/2023 1235)  Promote/optimize nutrition:   Offer water/supplements/favorite foods   Monitor/record intake including meals  Goal: Promote skin healing  Outcome: Progressing  Flowsheets (Taken 12/18/2023 1235)  Promote skin healing:   Turn/reposition every 2 hours/use positioning/transfer devices   Assess skin/pad under line(s)/device(s)     Problem: Acute Kidney Failure  Goal: Electrolytes/labs return to normal range  Outcome: Progressing  Goal: No signs of infection  Outcome: Progressing  Goal: Stable weight and I&O  Outcome: Progressing     Problem:  Obstructive: Kidney Stones, Hydronephrosis, BPH  Goal: Return of normal voiding pattern  Outcome: Progressing     Problem: Pain - Adult  Goal: Verbalizes/displays adequate comfort level or baseline comfort level  Outcome: Progressing     Problem: Safety - Adult  Goal: Free from fall injury  Outcome: Progressing     Problem: Discharge Planning  Goal: Discharge to home or other facility with appropriate resources  Outcome: Progressing     Problem: Chronic Conditions and Co-morbidities  Goal: Patient's chronic conditions and co-morbidity symptoms are monitored and maintained or improved  Outcome: Progressing

## 2023-12-18 NOTE — CARE PLAN
The patient's goals for the shift include PATIENT WILL REMAIN SAFE FROM FALLS AND INJURIES DURING THIS SHIFT    The clinical goals for the shift include will remain HD stable this shift    Patient remained stable this shift, BP low at midnight but MAP remained WNL at 65. Pain managed with tylenol ATC. Low urine output, concentrated, [facility] contacted, no new orders. Patient skin intact. Small BM last night. Planning for discharge to [facility].

## 2023-12-18 NOTE — NURSING NOTE
Report called to the [facility]    Spoke to nurse, gave report to nurse Micki      Pt aware of discharge    IV removed    Pt packed up & appears comfortable     Daughter updated on pt going over to [facility]       16:48 Pt picked up by transportation

## 2023-12-18 NOTE — PROGRESS NOTES
Occupational Therapy    OT Treatment    Patient Name: Noemí Hinton  MRN: [id number]  Today's Date: 12/18/2023  Time Calculation  Start Time: 0929  Stop Time: 0959  Time Calculation (min): 30 min         Assessment:  Pt is very pleasant, cooperative, easily fatigued with activity, requires increased time and rest breaks throughout.  End of Session Communication: Bedside nurse  End of Session Patient Position: Up in chair, Alarm on (LE's elevated, all needs in reach)       Plan:  OT Frequency: 3 times per week  OT Discharge Recommendations: Moderate intensity level of continued care     Subjective      12/18/23 0929   OT Last Visit   OT Received On 12/18/23   General   Prior to Session Communication Bedside nurse   Patient Position Received Bed, 3 rail up;Alarm on   General Comment Pt agreeable to tx, cleared for participation.   Precautions   Medical Precautions Fall precautions   Cognition   Overall Cognitive Status WFL   UE Bathing   UE Bathing Level of Assistance Close supervision;Setup   UE Bathing Where Assessed   (recliner level)   UE Bathing Comments Pt completed light UB sponge bathing with SBA using wipes after setup.   LE Bathing   LE Bathing Level of Assistance Maximum assistance   LE Bathing Comments Pt Max A to complete LB sponge bathing including LE's while seted, and aditya care while in stance with UE support on fww. Pt washed upper LE's while seated with cues and setup.   UE Dressing   UE Dressing Level of Assistance Close supervision;Minimal verbal cues   UE Dressing Where Assessed Recliner   UE Dressing Comments cues to doff/don   LE Dressing   LE Dressing Yes   Sock Level of Assistance Dependent   Adult Briefs Level of Assistance Maximum assistance   LE Dressing Where Assessed Recliner   LE Dressing Comments Pt Max A>Dep to doff/don clean socks and brief at this time due to being soiled, assist for threading LE's through brief and hiking over hips while in stance.   Toileting   Toileting Level of  Assistance Maximum assistance   Where Assessed   (incontinent, aditya care in stance)   Toileting Comments Pt Max A to complete aditya care following incontinence episode with standing and mobility, B UE support on AD with cues for uprigt stance   Functional Standing Tolerance   Time 1 minute   Activity static standing   Functional Standing Tolerance Comments B UE support, Min A for safety, kyphotic posture with cues to correct   Bed Mobility 1   Bed Mobility 1 Supine to sitting   Level of Assistance 1 Moderate assistance;Moderate verbal cues   Bed Mobility Comments 1 HOB elevated, use of draw sheet to assist scooting out toward EOB.   Transfer 1   Technique 1 Sit to stand;Stand to sit   Transfer Device 1 Gait belt;Walker   Transfer Level of Assistance 1 Minimal verbal cues;+2;Minimal tactile cues   Trials/Comments 1 Pt completed STS functional transfers from various surfaces including EOB and recliner to fww level with Min A x2, min cues for safety with good follow through.   Pt ambulated short distance bed>recliner using fww with Min A x2, increased time, cues for safety, upright stance and proper stand>sit with fair follow through.     Static Sitting Balance   Static Sitting-Balance Support Bilateral upper extremity supported;Right upper extremity supported;Left upper extremity supported   Static Sitting-Level of Assistance Close supervision;Contact guard   Static Sitting-Comment/Number of Minutes fair/fair-, initially retro with slight L lean upon sitting EOB, cues to correct   Dynamic Sitting Balance   Dynamic Sitting-Balance Support Left upper extremity supported;Right upper extremity supported   Dynamic Sitting-Balance Reaching across midline;Reaching for objects   Dynamic Sitting-Comments fair/fair-   Static Standing Balance   Static Standing-Balance Support Bilateral upper extremity supported   Static Standing-Level of Assistance Minimum assistance   Static Standing-Comment/Number of Minutes fair/fair-    Dynamic Standing Balance   Dynamic Standing-Balance Support Bilateral upper extremity supported   Dynamic Standing-Comments fair-   IP OT Assessment   End of Session Communication Bedside nurse   End of Session Patient Position Up in chair;Alarm on  (LE's elevated, all needs in reach)   Inpatient Plan   OT Frequency 3 times per week   OT Discharge Recommendations Moderate intensity level of continued care     Outcome Measures:[facility] Daily Activity  Putting on and taking off regular lower body clothing: A lot  Bathing (including washing, rinsing, drying): A lot  Putting on and taking off regular upper body clothing: A little  Toileting, which includes using toilet, bedpan or urinal: A lot  Taking care of personal grooming such as brushing teeth: A little  Eating Meals: A little  Daily Activity - Total Score: 15  Education Documentation  ADL Training, taught by MOHSEN Wayne at 12/18/2023 10:15 AM.  Learner: Patient  Readiness: Acceptance  Method: Explanation  Response: Verbalizes Understanding  Comment: functional transfer safety, pacing and EC during ADLs    Education Comments  No comments found.            Goals:  Encounter Problems       Encounter Problems (Active)       OT Goals       OT Goal 1 (Progressing)       Start:  12/11/23    Expected End:  12/18/23       Pt will complete all bed mobility with Min A safely            OT Goal 2 (Progressing)       Start:  12/11/23    Expected End:  12/18/23       Pt will complete ADL's and mobility with good sit balance and fair+ stand balance           OT Goal 3 (Progressing)       Start:  12/11/23    Expected End:  12/18/23       Pt with complete all transfers safely with CGA           OT Goal 4 (Progressing)       Start:  12/11/23    Expected End:  12/18/23       Pt will complete UB dressing ADL's with CGA using adaptive device(s) as needed          OT Goal 5 (Progressing)       Start:  12/11/23    Expected End:  12/18/23       Pt with complete grooming ADL's with  supervision after setup

## 2023-12-18 NOTE — PROGRESS NOTES
Physical Therapy    Physical Therapy    Physical Therapy Treatment    Patient Name: Noemí Hinton  MRN: [id number]  Today's Date: 12/18/2023  Time Calculation  Start Time: 0924  Stop Time: 1002  Time Calculation (min): 38 min       Assessment/Plan   PT Assessment  PT Assessment Results: Decreased strength, Decreased endurance, Impaired balance, Decreased mobility  Rehab Prognosis: Good  Evaluation/Treatment Tolerance: Patient tolerated treatment well  Medical Staff Made Aware: Yes  End of Session Communication: Bedside nurse  Assessment Comment: Pt is pleasant, however, weepy d/t  passing away. Good effort put forth by pt. Increased time and effort to complete all activities. Pt incontient upon standing, increased time for aditya care.  End of Session Patient Position: Up in chair  PT Plan  Inpatient/Swing Bed or Outpatient: Inpatient  PT Plan  Treatment/Interventions: Bed mobility, Transfer training, Gait training, Balance training, Neuromuscular re-education, Strengthening, Therapeutic exercise, Therapeutic activity  PT Plan: Skilled PT  PT Frequency: 3 times per week  PT Discharge Recommendations: Moderate intensity level of continued care  Equipment Recommended upon Discharge: Wheeled walker  PT Recommended Transfer Status: Assist x1  PT - OK to Discharge: Yes     12/18/23 0924   PT  Visit   PT Received On 12/18/23   Response to Previous Treatment Patient with no complaints from previous session.   General   Prior to Session Communication Bedside nurse   Patient Position Received Bed, 3 rail up   General Comment Pt agreeable to therapy and cleared for participation.   Precautions   Medical Precautions Fall precautions   Pain Assessment   Pain Assessment 0-10   Pain Score 0 - No pain   Effect of Pain on Daily Activities .   Cognition   Overall Cognitive Status WFL   Therapeutic Activity   Therapeutic Activity Performed Yes   Bed Mobility   Bed Mobility Yes   Bed Mobility 1   Bed Mobility 1 Supine to sitting    Level of Assistance 1 Moderate assistance   Bed Mobility Comments 1 Increased time and effort to complete with cues for technique. Pt is retro with L lean upon sitting EOB. Max cues and minAx2 to facilitate upright posture with good effort.   Ambulation/Gait Training   Ambulation/Gait Training Performed Yes   Ambulation/Gait Training 1   Surface 1 Level tile   Device 1 Rolling walker   Gait Support Devices Gait belt   Assistance 1 Minimum assistance;Moderate verbal cues;Moderate tactile cues  (x2)   Quality of Gait 1 NBOS;Diminished heel strike;Shuffling gait   Comments/Distance (ft) 1 5'x2-Increased time and effort to complete. Max cues and assist with WW management for safety. 1 retro LOB noted. Kyphotic posture wth max v/t cues to facilitate upright stance.   Transfers   Transfer Yes   Transfer 1   Transfer From 1 Bed to   Transfer to 1 Chair with arms   Technique 1 Sit to stand;Stand to sit   Transfer Device 1 Gait belt;Walker   Transfer Level of Assistance 1 Minimum assistance;+2;Moderate tactile cues;Moderate verbal cues   Trials/Comments 1 Multiple transfers performed, increased time and effort with max cues for technique and increased safety and stability.   Other Activity   Other Activity Performed Yes   Other Activity 1 Pt incontinent upon standing, increased time for aditya care with multiple STS transfers.   Activity Tolerance   Endurance Tolerates 10 - 20 min exercise with multiple rests   PT Assessment   PT Assessment Results Decreased strength;Decreased endurance;Impaired balance;Decreased mobility   Rehab Prognosis Good   End of Session Patient Position Up in chair   Outpatient Education   Individual(s) Educated Patient   Education Provided Fall Risk   PT Plan   Inpatient/Swing Bed or Outpatient Inpatient     Outcome Measures:  [facility] Basic Mobility  Turning from your back to your side while in a flat bed without using bedrails: A lot  Moving from lying on your back to sitting on the side of a flat  bed without using bedrails: A lot  Moving to and from bed to chair (including a wheelchair): A lot  Standing up from a chair using your arms (e.g. wheelchair or bedside chair): A lot  To walk in hospital room: A lot  Climbing 3-5 steps with railing: A lot  Basic Mobility - Total Score: 12  Education Documentation  Body Mechanics, taught by aNa Vaughan PTA at 12/18/2023 10:11 AM.  Learner: Patient  Readiness: Acceptance  Method: Explanation  Response: Verbalizes Understanding, Demonstrated Understanding, Needs Reinforcement    Precautions, taught by Naa Vaughan PTA at 12/18/2023 10:11 AM.  Learner: Patient  Readiness: Acceptance  Method: Explanation  Response: Verbalizes Understanding, Demonstrated Understanding, Needs Reinforcement    Home Exercise Program, taught by Naa Vaughan PTA at 12/18/2023 10:11 AM.  Learner: Patient  Readiness: Acceptance  Method: Explanation  Response: Verbalizes Understanding, Demonstrated Understanding, Needs Reinforcement    Mobility Training, taught by Naa Vaughan PTA at 12/18/2023 10:11 AM.  Learner: Patient  Readiness: Acceptance  Method: Explanation  Response: Verbalizes Understanding, Demonstrated Understanding, Needs Reinforcement    Education Comments  No comments found.            EDUCATION:  Outpatient Education  Individual(s) Educated: Patient  Education Provided: Fall Risk    GOALS:  Encounter Problems       Encounter Problems (Active)       PT Problem       Pt will be able to perform all bed mobility tasks with CGA.  (Progressing)       Start:  12/11/23    Expected End:  12/18/23            Pt will perform all transfers with CGA and FWW with proper safety mechanics.   (Progressing)       Start:  12/11/23    Expected End:  12/18/23            Pt will ambulate 30 ft with CGA using FWW for improved functional independence.  (Progressing)       Start:  12/11/23    Expected End:  12/18/23            Pt will demonstrate good stand balance for completion of  therapeutic exercises and functional tasks.  (Progressing)       Start:  12/11/23    Expected End:  12/18/23               Pain - Adult

## 2023-12-24 NOTE — DISCHARGE SUMMARY
HOSPITAL COURSE AND DETAILS INCLUDING HPI/PHYSICAL EXAM /AND ADMISSION INFO    Noemí iHnton is a 89 y.o. female presenting with 89-year-old female who has a history of A-fib she has a history of CAD status post cardiac stents, anxiety and multiple other medical problems.  Patient is a DNR CC.  She is from a local nursing facility apparently patient went to the restroom last evening and did have blood in her depends.  She has now developed severe hematuria.  She was supratherapeutic on her INR.  And I have reviewed all the ER notes      Constitutional:       Appearance: Normal appearance. She is obese.   HENT:      Head: Normocephalic and atraumatic.      Nose: Nose normal.      Mouth/Throat:      Mouth: Mucous membranes are moist.   Eyes:      Extraocular Movements: Extraocular movements intact.      Conjunctiva/sclera: Conjunctivae normal.      Pupils: Pupils are equal, round, and reactive to light.   Cardiovascular:      Rate and Rhythm: Normal rate and regular rhythm.      Pulses: Normal pulses.   Pulmonary:      Effort: Pulmonary effort is normal.   Abdominal:      General: Abdomen is flat. Bowel sounds are normal.      Palpations: Abdomen is soft.   Musculoskeletal:         General: Normal range of motion.      Cervical back: Normal range of motion and neck supple.   Skin:     General: Skin is warm and dry.   Neurological:      General: No focal deficit present.      Mental Status: She is alert. She is disoriented.   Psychiatric:         Mood and Affect: Mood normal.         Behavior: Behavior normal.         Thought Content: Thought content normal.         Judgment: Judgment normal.      Hematuria  Active Problems:    Urinary frequency    Supratherapeutic INR    Dysuria    Hematoma of bladder wall    Lower abdominal pain    Other hydronephrosis        Hold anticoagulants  IV fluids  Urology evaluation pending  Repeat H&H today  Patient was admitted to the hospital we continue to monitor the electrolytes and  the H&H.  Patient was starting to do satisfactorily and we continue to monitor kidney function after precertification was obtained patient was discharged        DISCHARGE DIAGNOSIS          Problem List Items Addressed This Visit             ICD-10-CM    Anxiety F41.9    Neuropathy G62.9    Relevant Medications    gabapentin (Neurontin) 300 mg capsule    Supratherapeutic INR R79.1    * (Principal) Hematuria R31.9     Other Visit Diagnoses         Codes    Vaginal bleeding    -  Primary N93.9    Adverse effect of warfarin     T45.515A    Hydronephrosis due to obstruction of bladder     N13.30, N32.0    Hyperkalemia     E87.5    Acidosis     E87.20    YAA (acute kidney injury) (CMS/[facility])     N17.9    Dermatitis     L30.9    Relevant Medications    nystatin (Mycostatin) 100,000 unit/gram powder                                      Last Recorded Vitals:  Vitals:    12/18/23 0000 12/18/23 0400 12/18/23 0700 12/18/23 1500   BP: (!) 89/47 (!) 103/47 128/63 137/65   BP Location: Right arm Right arm     Patient Position: Lying Lying     Pulse: 72 69 64 78   Resp: 16 18 20 17   Temp: 37.5 °C (99.5 °F) 36.9 °C (98.4 °F) 37.3 °C (99.1 °F) 37.1 °C (98.8 °F)   TempSrc: Temporal Temporal Temporal Temporal   SpO2: 95% 95% 97% 94%   Weight:       Height:                 DISCHARGE MEDICATIONS       Your medication list        START taking these medications        Instructions Last Dose Given Next Dose Due   nystatin 100,000 unit/gram powder  Commonly known as: Mycostatin      Apply 1 Application topically 2 times a day. Apply under breasts              CHANGE how you take these medications        Instructions Last Dose Given Next Dose Due   gabapentin 300 mg capsule  Commonly known as: Neurontin  What changed:   when to take this  Another medication with the same name was removed. Continue taking this medication, and follow the directions you see here.      Take 1 capsule (300 mg) by mouth 2 times a day.              CONTINUE taking  these medications        Instructions Last Dose Given Next Dose Due   atenolol 25 mg tablet  Commonly known as: Tenormin           DULoxetine 20 mg DR capsule  Commonly known as: Cymbalta           ferrous sulfate 325 (65 Fe) MG EC tablet           lidocaine 5 % patch  Commonly known as: Lidoderm      APPLY TO LEFT OR RIGHT SHOULDER 1 PATCH TOPICALLY ONE TIME A DAY AS NEEDED (ON FOR 12 HOURS AND OFF FOR 12 HOURS)       acetaminophen 325 mg tablet  Commonly known as: Tylenol           PharbetoL 325 mg tablet  Generic drug: acetaminophen      1 TABLET BY MOUTH ONE TIME A DAY              STOP taking these medications      UNABLE TO FIND        warfarin 3 mg tablet  Commonly known as: Coumadin        warfarin 5 mg tablet  Commonly known as: Coumadin                  Where to Get Your Medications        Information about where to get these medications is not yet available    Ask your nurse or doctor about these medications  gabapentin 300 mg capsule  nystatin 100,000 unit/gram powder           OUTPATIENT FOLLOW-UP    No future appointments.    Patient has been given the appropriate discharge instructions discharge medications and follow-up with appropriate physicians.  For all of the pertinent data  including labs- consults-imaging please see the chart.

## 2024-01-24 ENCOUNTER — LAB REQUISITION (OUTPATIENT)
Dept: LAB | Facility: LAB | Age: 89
End: 2024-01-24
Payer: MEDICARE

## 2024-01-24 DIAGNOSIS — R30.0 DYSURIA: ICD-10-CM

## 2024-01-24 DIAGNOSIS — R39.15 URGENCY OF URINATION: ICD-10-CM

## 2024-01-24 DIAGNOSIS — R82.991 HYPOCITRATURIA: ICD-10-CM

## 2024-01-24 LAB
APPEARANCE UR: ABNORMAL
BILIRUB UR STRIP.AUTO-MCNC: NEGATIVE MG/DL
COLOR UR: ABNORMAL
GLUCOSE UR STRIP.AUTO-MCNC: NEGATIVE MG/DL
KETONES UR STRIP.AUTO-MCNC: NEGATIVE MG/DL
LEUKOCYTE ESTERASE UR QL STRIP.AUTO: ABNORMAL
MUCOUS THREADS #/AREA URNS AUTO: ABNORMAL /LPF
NITRITE UR QL STRIP.AUTO: NEGATIVE
PH UR STRIP.AUTO: 8 [PH]
PROT UR STRIP.AUTO-MCNC: ABNORMAL MG/DL
RBC # UR STRIP.AUTO: ABNORMAL /UL
RBC #/AREA URNS AUTO: ABNORMAL /HPF
SP GR UR STRIP.AUTO: 1.01
SQUAMOUS #/AREA URNS AUTO: ABNORMAL /HPF
TRI-PHOS CRY #/AREA UR COMP ASSIST: ABNORMAL /HPF
UROBILINOGEN UR STRIP.AUTO-MCNC: <2 MG/DL
WBC #/AREA URNS AUTO: ABNORMAL /HPF
WBC CLUMPS #/AREA URNS AUTO: ABNORMAL /HPF

## 2024-01-24 PROCEDURE — 87086 URINE CULTURE/COLONY COUNT: CPT

## 2024-01-24 PROCEDURE — 81001 URINALYSIS AUTO W/SCOPE: CPT

## 2024-01-25 LAB — BACTERIA UR CULT: ABNORMAL

## 2024-07-17 ENCOUNTER — HOSPITAL ENCOUNTER (INPATIENT)
Facility: HOSPITAL | Age: 89
Discharge: NURSING, REHAB OR LONG-TERM CARE | DRG: 175 | End: 2024-07-17
Attending: EMERGENCY MEDICINE | Admitting: INTERNAL MEDICINE
Payer: MEDICARE

## 2024-07-17 ENCOUNTER — APPOINTMENT (OUTPATIENT)
Dept: CARDIOLOGY | Facility: HOSPITAL | Age: 89
DRG: 175 | End: 2024-07-17
Payer: MEDICARE

## 2024-07-17 ENCOUNTER — APPOINTMENT (OUTPATIENT)
Dept: RADIOLOGY | Facility: HOSPITAL | Age: 89
DRG: 175 | End: 2024-07-17
Payer: MEDICARE

## 2024-07-17 DIAGNOSIS — R79.89 ELEVATED D-DIMER: ICD-10-CM

## 2024-07-17 DIAGNOSIS — J18.9 PNEUMONIA DUE TO INFECTIOUS ORGANISM, UNSPECIFIED LATERALITY, UNSPECIFIED PART OF LUNG: Primary | ICD-10-CM

## 2024-07-17 DIAGNOSIS — M79.605 PAIN AND SWELLING OF LEFT LOWER EXTREMITY: ICD-10-CM

## 2024-07-17 DIAGNOSIS — I26.99 PE (PULMONARY THROMBOEMBOLISM) (MULTI): ICD-10-CM

## 2024-07-17 DIAGNOSIS — N63.0 MASS OF BREAST, UNSPECIFIED LATERALITY: ICD-10-CM

## 2024-07-17 DIAGNOSIS — M79.89 PAIN AND SWELLING OF LEFT LOWER EXTREMITY: ICD-10-CM

## 2024-07-17 DIAGNOSIS — Z87.19 H/O: GI BLEED: ICD-10-CM

## 2024-07-17 DIAGNOSIS — K59.00 CONSTIPATION, UNSPECIFIED CONSTIPATION TYPE: ICD-10-CM

## 2024-07-17 DIAGNOSIS — I26.99 ACUTE PULMONARY EMBOLISM, UNSPECIFIED PULMONARY EMBOLISM TYPE, UNSPECIFIED WHETHER ACUTE COR PULMONALE PRESENT (MULTI): ICD-10-CM

## 2024-07-17 LAB
ALBUMIN SERPL BCP-MCNC: 3.7 G/DL (ref 3.4–5)
ALP SERPL-CCNC: 71 U/L (ref 33–136)
ALT SERPL W P-5'-P-CCNC: 8 U/L (ref 7–45)
ANION GAP SERPL CALC-SCNC: 14 MMOL/L (ref 10–20)
AST SERPL W P-5'-P-CCNC: 14 U/L (ref 9–39)
BASOPHILS # BLD AUTO: 0.05 X10*3/UL (ref 0–0.1)
BASOPHILS NFR BLD AUTO: 0.4 %
BILIRUB SERPL-MCNC: 1.5 MG/DL (ref 0–1.2)
BNP SERPL-MCNC: 674 PG/ML (ref 0–99)
BUN SERPL-MCNC: 16 MG/DL (ref 6–23)
CALCIUM SERPL-MCNC: 9.4 MG/DL (ref 8.6–10.3)
CARDIAC TROPONIN I PNL SERPL HS: 11 NG/L (ref 0–13)
CARDIAC TROPONIN I PNL SERPL HS: 12 NG/L (ref 0–13)
CHLORIDE SERPL-SCNC: 105 MMOL/L (ref 98–107)
CO2 SERPL-SCNC: 26 MMOL/L (ref 21–32)
CREAT SERPL-MCNC: 0.74 MG/DL (ref 0.5–1.05)
D DIMER PPP FEU-MCNC: 2319 NG/ML FEU
EGFRCR SERPLBLD CKD-EPI 2021: 75 ML/MIN/1.73M*2
EOSINOPHIL # BLD AUTO: 0.03 X10*3/UL (ref 0–0.4)
EOSINOPHIL NFR BLD AUTO: 0.2 %
ERYTHROCYTE [DISTWIDTH] IN BLOOD BY AUTOMATED COUNT: 13.8 % (ref 11.5–14.5)
GLUCOSE SERPL-MCNC: 98 MG/DL (ref 74–99)
HCT VFR BLD AUTO: 47.8 % (ref 36–46)
HGB BLD-MCNC: 15.2 G/DL (ref 12–16)
IMM GRANULOCYTES # BLD AUTO: 0.05 X10*3/UL (ref 0–0.5)
IMM GRANULOCYTES NFR BLD AUTO: 0.4 % (ref 0–0.9)
INR PPP: 1.2 (ref 0.9–1.1)
LACTATE SERPL-SCNC: 1.2 MMOL/L (ref 0.4–2)
LYMPHOCYTES # BLD AUTO: 0.57 X10*3/UL (ref 0.8–3)
LYMPHOCYTES NFR BLD AUTO: 4.3 %
MCH RBC QN AUTO: 30 PG (ref 26–34)
MCHC RBC AUTO-ENTMCNC: 31.8 G/DL (ref 32–36)
MCV RBC AUTO: 95 FL (ref 80–100)
MONOCYTES # BLD AUTO: 0.93 X10*3/UL (ref 0.05–0.8)
MONOCYTES NFR BLD AUTO: 7 %
NEUTROPHILS # BLD AUTO: 11.58 X10*3/UL (ref 1.6–5.5)
NEUTROPHILS NFR BLD AUTO: 87.7 %
NRBC BLD-RTO: 0 /100 WBCS (ref 0–0)
PLATELET # BLD AUTO: 181 X10*3/UL (ref 150–450)
POTASSIUM SERPL-SCNC: 4.6 MMOL/L (ref 3.5–5.3)
PROT SERPL-MCNC: 7 G/DL (ref 6.4–8.2)
PROTHROMBIN TIME: 13.2 SECONDS (ref 9.8–12.8)
RBC # BLD AUTO: 5.06 X10*6/UL (ref 4–5.2)
SARS-COV-2 RNA RESP QL NAA+PROBE: NOT DETECTED
SODIUM SERPL-SCNC: 140 MMOL/L (ref 136–145)
WBC # BLD AUTO: 13.2 X10*3/UL (ref 4.4–11.3)

## 2024-07-17 PROCEDURE — 87040 BLOOD CULTURE FOR BACTERIA: CPT | Mod: STJLAB | Performed by: PHYSICIAN ASSISTANT

## 2024-07-17 PROCEDURE — 2500000004 HC RX 250 GENERAL PHARMACY W/ HCPCS (ALT 636 FOR OP/ED)

## 2024-07-17 PROCEDURE — 71275 CT ANGIOGRAPHY CHEST: CPT

## 2024-07-17 PROCEDURE — 84145 PROCALCITONIN (PCT): CPT | Mod: STJLAB | Performed by: PHYSICIAN ASSISTANT

## 2024-07-17 PROCEDURE — 2550000001 HC RX 255 CONTRASTS: Performed by: INTERNAL MEDICINE

## 2024-07-17 PROCEDURE — 87635 SARS-COV-2 COVID-19 AMP PRB: CPT | Performed by: PHYSICIAN ASSISTANT

## 2024-07-17 PROCEDURE — 99222 1ST HOSP IP/OBS MODERATE 55: CPT | Performed by: PHYSICIAN ASSISTANT

## 2024-07-17 PROCEDURE — 99285 EMERGENCY DEPT VISIT HI MDM: CPT

## 2024-07-17 PROCEDURE — 93010 ELECTROCARDIOGRAM REPORT: CPT | Performed by: EMERGENCY MEDICINE

## 2024-07-17 PROCEDURE — 93005 ELECTROCARDIOGRAM TRACING: CPT

## 2024-07-17 PROCEDURE — 71250 CT THORAX DX C-: CPT

## 2024-07-17 PROCEDURE — 83605 ASSAY OF LACTIC ACID: CPT | Performed by: PHYSICIAN ASSISTANT

## 2024-07-17 PROCEDURE — 85025 COMPLETE CBC W/AUTO DIFF WBC: CPT

## 2024-07-17 PROCEDURE — 2500000005 HC RX 250 GENERAL PHARMACY W/O HCPCS: Performed by: PHYSICIAN ASSISTANT

## 2024-07-17 PROCEDURE — 36415 COLL VENOUS BLD VENIPUNCTURE: CPT

## 2024-07-17 PROCEDURE — 2500000004 HC RX 250 GENERAL PHARMACY W/ HCPCS (ALT 636 FOR OP/ED): Performed by: PHYSICIAN ASSISTANT

## 2024-07-17 PROCEDURE — 2500000001 HC RX 250 WO HCPCS SELF ADMINISTERED DRUGS (ALT 637 FOR MEDICARE OP): Performed by: PHYSICIAN ASSISTANT

## 2024-07-17 PROCEDURE — 71275 CT ANGIOGRAPHY CHEST: CPT | Performed by: RADIOLOGY

## 2024-07-17 PROCEDURE — 85610 PROTHROMBIN TIME: CPT

## 2024-07-17 PROCEDURE — 93010 ELECTROCARDIOGRAM REPORT: CPT | Performed by: INTERNAL MEDICINE

## 2024-07-17 PROCEDURE — 87632 RESP VIRUS 6-11 TARGETS: CPT | Performed by: PHYSICIAN ASSISTANT

## 2024-07-17 PROCEDURE — 84484 ASSAY OF TROPONIN QUANT: CPT

## 2024-07-17 PROCEDURE — 94640 AIRWAY INHALATION TREATMENT: CPT

## 2024-07-17 PROCEDURE — 83880 ASSAY OF NATRIURETIC PEPTIDE: CPT

## 2024-07-17 PROCEDURE — 96375 TX/PRO/DX INJ NEW DRUG ADDON: CPT

## 2024-07-17 PROCEDURE — 80053 COMPREHEN METABOLIC PANEL: CPT

## 2024-07-17 PROCEDURE — 71250 CT THORAX DX C-: CPT | Mod: FOREIGN READ | Performed by: RADIOLOGY

## 2024-07-17 PROCEDURE — 72128 CT CHEST SPINE W/O DYE: CPT

## 2024-07-17 PROCEDURE — 1200000002 HC GENERAL ROOM WITH TELEMETRY DAILY

## 2024-07-17 PROCEDURE — 2500000004 HC RX 250 GENERAL PHARMACY W/ HCPCS (ALT 636 FOR OP/ED): Performed by: NURSE PRACTITIONER

## 2024-07-17 PROCEDURE — 2500000002 HC RX 250 W HCPCS SELF ADMINISTERED DRUGS (ALT 637 FOR MEDICARE OP, ALT 636 FOR OP/ED): Performed by: PHYSICIAN ASSISTANT

## 2024-07-17 PROCEDURE — 96365 THER/PROPH/DIAG IV INF INIT: CPT

## 2024-07-17 PROCEDURE — 85379 FIBRIN DEGRADATION QUANT: CPT | Performed by: PHYSICIAN ASSISTANT

## 2024-07-17 PROCEDURE — 72128 CT CHEST SPINE W/O DYE: CPT | Mod: FOREIGN READ | Performed by: RADIOLOGY

## 2024-07-17 PROCEDURE — 2500000001 HC RX 250 WO HCPCS SELF ADMINISTERED DRUGS (ALT 637 FOR MEDICARE OP)

## 2024-07-17 PROCEDURE — 2500000005 HC RX 250 GENERAL PHARMACY W/O HCPCS

## 2024-07-17 PROCEDURE — 99285 EMERGENCY DEPT VISIT HI MDM: CPT | Performed by: EMERGENCY MEDICINE

## 2024-07-17 RX ORDER — ONDANSETRON 4 MG/1
4 TABLET, ORALLY DISINTEGRATING ORAL EVERY 8 HOURS PRN
Status: DISCONTINUED | OUTPATIENT
Start: 2024-07-17 | End: 2024-07-22 | Stop reason: HOSPADM

## 2024-07-17 RX ORDER — CEFTRIAXONE 1 G/50ML
1 INJECTION, SOLUTION INTRAVENOUS EVERY 24 HOURS
Status: DISCONTINUED | OUTPATIENT
Start: 2024-07-17 | End: 2024-07-22 | Stop reason: HOSPADM

## 2024-07-17 RX ORDER — PANTOPRAZOLE SODIUM 40 MG/1
40 TABLET, DELAYED RELEASE ORAL
Status: DISCONTINUED | OUTPATIENT
Start: 2024-07-18 | End: 2024-07-22 | Stop reason: HOSPADM

## 2024-07-17 RX ORDER — IPRATROPIUM BROMIDE AND ALBUTEROL SULFATE 2.5; .5 MG/3ML; MG/3ML
3 SOLUTION RESPIRATORY (INHALATION)
Status: DISCONTINUED | OUTPATIENT
Start: 2024-07-17 | End: 2024-07-22 | Stop reason: HOSPADM

## 2024-07-17 RX ORDER — ACETAMINOPHEN 325 MG/1
650 TABLET ORAL EVERY 4 HOURS PRN
Status: DISCONTINUED | OUTPATIENT
Start: 2024-07-17 | End: 2024-07-22 | Stop reason: HOSPADM

## 2024-07-17 RX ORDER — METHOCARBAMOL 500 MG/1
500 TABLET, FILM COATED ORAL EVERY 8 HOURS SCHEDULED
Status: COMPLETED | OUTPATIENT
Start: 2024-07-17 | End: 2024-07-20

## 2024-07-17 RX ORDER — ACETAMINOPHEN 325 MG/1
975 TABLET ORAL ONCE
Status: COMPLETED | OUTPATIENT
Start: 2024-07-17 | End: 2024-07-17

## 2024-07-17 RX ORDER — DOCUSATE SODIUM 100 MG/1
100 CAPSULE, LIQUID FILLED ORAL 2 TIMES DAILY
Status: DISCONTINUED | OUTPATIENT
Start: 2024-07-17 | End: 2024-07-22 | Stop reason: HOSPADM

## 2024-07-17 RX ORDER — CEFTRIAXONE 2 G/50ML
2 INJECTION, SOLUTION INTRAVENOUS ONCE
Status: DISCONTINUED | OUTPATIENT
Start: 2024-07-17 | End: 2024-07-17 | Stop reason: SDUPTHER

## 2024-07-17 RX ORDER — LIDOCAINE 560 MG/1
1 PATCH PERCUTANEOUS; TOPICAL; TRANSDERMAL ONCE
Status: COMPLETED | OUTPATIENT
Start: 2024-07-17 | End: 2024-07-18

## 2024-07-17 RX ORDER — ONDANSETRON HYDROCHLORIDE 2 MG/ML
4 INJECTION, SOLUTION INTRAVENOUS EVERY 8 HOURS PRN
Status: DISCONTINUED | OUTPATIENT
Start: 2024-07-17 | End: 2024-07-22 | Stop reason: HOSPADM

## 2024-07-17 RX ORDER — METHOCARBAMOL 500 MG/1
500 TABLET, FILM COATED ORAL ONCE
Status: COMPLETED | OUTPATIENT
Start: 2024-07-17 | End: 2024-07-17

## 2024-07-17 RX ORDER — PANTOPRAZOLE SODIUM 40 MG/10ML
40 INJECTION, POWDER, LYOPHILIZED, FOR SOLUTION INTRAVENOUS
Status: DISCONTINUED | OUTPATIENT
Start: 2024-07-18 | End: 2024-07-22 | Stop reason: HOSPADM

## 2024-07-17 RX ORDER — TALC
3 POWDER (GRAM) TOPICAL NIGHTLY PRN
Status: DISCONTINUED | OUTPATIENT
Start: 2024-07-17 | End: 2024-07-22 | Stop reason: HOSPADM

## 2024-07-17 RX ORDER — ATENOLOL 25 MG/1
25 TABLET ORAL NIGHTLY
Status: DISCONTINUED | OUTPATIENT
Start: 2024-07-17 | End: 2024-07-22 | Stop reason: HOSPADM

## 2024-07-17 RX ORDER — ACETAMINOPHEN 650 MG/1
650 SUPPOSITORY RECTAL EVERY 4 HOURS PRN
Status: CANCELLED | OUTPATIENT
Start: 2024-07-17

## 2024-07-17 RX ORDER — ACETAMINOPHEN 160 MG/5ML
650 SOLUTION ORAL EVERY 4 HOURS PRN
Status: CANCELLED | OUTPATIENT
Start: 2024-07-17

## 2024-07-17 RX ORDER — BISACODYL 5 MG
10 TABLET, DELAYED RELEASE (ENTERIC COATED) ORAL DAILY PRN
Status: DISCONTINUED | OUTPATIENT
Start: 2024-07-17 | End: 2024-07-22 | Stop reason: HOSPADM

## 2024-07-17 SDOH — SOCIAL STABILITY: SOCIAL INSECURITY: HAS ANYONE EVER THREATENED TO HURT YOUR FAMILY OR YOUR PETS?: NO

## 2024-07-17 SDOH — SOCIAL STABILITY: SOCIAL INSECURITY: HAVE YOU HAD ANY THOUGHTS OF HARMING ANYONE ELSE?: NO

## 2024-07-17 SDOH — SOCIAL STABILITY: SOCIAL INSECURITY: DOES ANYONE TRY TO KEEP YOU FROM HAVING/CONTACTING OTHER FRIENDS OR DOING THINGS OUTSIDE YOUR HOME?: NO

## 2024-07-17 SDOH — SOCIAL STABILITY: SOCIAL INSECURITY: DO YOU FEEL ANYONE HAS EXPLOITED OR TAKEN ADVANTAGE OF YOU FINANCIALLY OR OF YOUR PERSONAL PROPERTY?: NO

## 2024-07-17 SDOH — SOCIAL STABILITY: SOCIAL INSECURITY: ARE YOU OR HAVE YOU BEEN THREATENED OR ABUSED PHYSICALLY, EMOTIONALLY, OR SEXUALLY BY ANYONE?: NO

## 2024-07-17 SDOH — SOCIAL STABILITY: SOCIAL INSECURITY: ARE THERE ANY APPARENT SIGNS OF INJURIES/BEHAVIORS THAT COULD BE RELATED TO ABUSE/NEGLECT?: NO

## 2024-07-17 SDOH — SOCIAL STABILITY: SOCIAL INSECURITY: ABUSE: ADULT

## 2024-07-17 SDOH — SOCIAL STABILITY: SOCIAL INSECURITY: DO YOU FEEL UNSAFE GOING BACK TO THE PLACE WHERE YOU ARE LIVING?: NO

## 2024-07-17 SDOH — SOCIAL STABILITY: SOCIAL INSECURITY: HAVE YOU HAD THOUGHTS OF HARMING ANYONE ELSE?: NO

## 2024-07-17 ASSESSMENT — ACTIVITIES OF DAILY LIVING (ADL)
GROOMING: NEEDS ASSISTANCE
LACK_OF_TRANSPORTATION: NO
WALKS IN HOME: NEEDS ASSISTANCE
DRESSING YOURSELF: NEEDS ASSISTANCE
HEARING - RIGHT EAR: FUNCTIONAL
PATIENT'S MEMORY ADEQUATE TO SAFELY COMPLETE DAILY ACTIVITIES?: YES
HEARING - LEFT EAR: FUNCTIONAL
TOILETING: NEEDS ASSISTANCE
ASSISTIVE_DEVICE: WALKER
BATHING: NEEDS ASSISTANCE
JUDGMENT_ADEQUATE_SAFELY_COMPLETE_DAILY_ACTIVITIES: YES
FEEDING YOURSELF: INDEPENDENT
ADEQUATE_TO_COMPLETE_ADL: YES

## 2024-07-17 ASSESSMENT — LIFESTYLE VARIABLES
HAVE YOU EVER FELT YOU SHOULD CUT DOWN ON YOUR DRINKING: NO
HOW OFTEN DO YOU HAVE A DRINK CONTAINING ALCOHOL: NEVER
EVER FELT BAD OR GUILTY ABOUT YOUR DRINKING: NO
TOTAL SCORE: 0
HOW MANY STANDARD DRINKS CONTAINING ALCOHOL DO YOU HAVE ON A TYPICAL DAY: PATIENT DOES NOT DRINK
AUDIT-C TOTAL SCORE: 0
SKIP TO QUESTIONS 9-10: 1
HAVE PEOPLE ANNOYED YOU BY CRITICIZING YOUR DRINKING: NO
AUDIT-C TOTAL SCORE: 0
EVER HAD A DRINK FIRST THING IN THE MORNING TO STEADY YOUR NERVES TO GET RID OF A HANGOVER: NO
HOW OFTEN DO YOU HAVE 6 OR MORE DRINKS ON ONE OCCASION: NEVER

## 2024-07-17 ASSESSMENT — PAIN SCALES - GENERAL
PAINLEVEL_OUTOF10: 5 - MODERATE PAIN
PAINLEVEL_OUTOF10: 8
PAINLEVEL_OUTOF10: 1
PAINLEVEL_OUTOF10: 3
PAINLEVEL_OUTOF10: 8
PAINLEVEL_OUTOF10: 8

## 2024-07-17 ASSESSMENT — COGNITIVE AND FUNCTIONAL STATUS - GENERAL
STANDING UP FROM CHAIR USING ARMS: A LOT
TOILETING: A LOT
MOBILITY SCORE: 15
MOVING FROM LYING ON BACK TO SITTING ON SIDE OF FLAT BED WITH BEDRAILS: A LOT
MOBILITY SCORE: 12
WALKING IN HOSPITAL ROOM: A LOT
DRESSING REGULAR UPPER BODY CLOTHING: A LOT
DRESSING REGULAR LOWER BODY CLOTHING: A LOT
CLIMB 3 TO 5 STEPS WITH RAILING: A LOT
MOBILITY SCORE: 12
DAILY ACTIVITIY SCORE: 15
STANDING UP FROM CHAIR USING ARMS: A LOT
TOILETING: A LOT
CLIMB 3 TO 5 STEPS WITH RAILING: A LOT
DAILY ACTIVITIY SCORE: 15
HELP NEEDED FOR BATHING: A LOT
WALKING IN HOSPITAL ROOM: A LOT
EATING MEALS: A LOT
TURNING FROM BACK TO SIDE WHILE IN FLAT BAD: A LOT
MOVING FROM LYING ON BACK TO SITTING ON SIDE OF FLAT BED WITH BEDRAILS: A LOT
STANDING UP FROM CHAIR USING ARMS: A LOT
MOBILITY SCORE: 15
HELP NEEDED FOR BATHING: A LOT
MOVING FROM LYING ON BACK TO SITTING ON SIDE OF FLAT BED WITH BEDRAILS: A LOT
PERSONAL GROOMING: A LITTLE
DAILY ACTIVITIY SCORE: 12
PERSONAL GROOMING: A LOT
TURNING FROM BACK TO SIDE WHILE IN FLAT BAD: A LOT
PATIENT BASELINE BEDBOUND: NO
CLIMB 3 TO 5 STEPS WITH RAILING: A LOT
TOILETING: A LOT
DRESSING REGULAR LOWER BODY CLOTHING: A LOT
TURNING FROM BACK TO SIDE WHILE IN FLAT BAD: A LOT
WALKING IN HOSPITAL ROOM: A LOT
MOVING TO AND FROM BED TO CHAIR: A LOT
TURNING FROM BACK TO SIDE WHILE IN FLAT BAD: A LITTLE
HELP NEEDED FOR BATHING: A LOT
HELP NEEDED FOR BATHING: A LOT
MOVING TO AND FROM BED TO CHAIR: A LOT
PERSONAL GROOMING: A LITTLE
DRESSING REGULAR UPPER BODY CLOTHING: A LOT
DAILY ACTIVITIY SCORE: 15
DRESSING REGULAR LOWER BODY CLOTHING: A LOT
TOILETING: A LOT
MOVING TO AND FROM BED TO CHAIR: A LOT
DRESSING REGULAR UPPER BODY CLOTHING: A LOT
DRESSING REGULAR LOWER BODY CLOTHING: A LOT
DRESSING REGULAR UPPER BODY CLOTHING: A LOT
STANDING UP FROM CHAIR USING ARMS: A LOT
MOVING TO AND FROM BED TO CHAIR: A LOT
WALKING IN HOSPITAL ROOM: A LITTLE
PERSONAL GROOMING: A LITTLE

## 2024-07-17 ASSESSMENT — PAIN DESCRIPTION - LOCATION
LOCATION_2: RIB CAGE
LOCATION: BACK
LOCATION: RIB CAGE

## 2024-07-17 ASSESSMENT — COLUMBIA-SUICIDE SEVERITY RATING SCALE - C-SSRS
2. HAVE YOU ACTUALLY HAD ANY THOUGHTS OF KILLING YOURSELF?: NO
1. IN THE PAST MONTH, HAVE YOU WISHED YOU WERE DEAD OR WISHED YOU COULD GO TO SLEEP AND NOT WAKE UP?: NO
6. HAVE YOU EVER DONE ANYTHING, STARTED TO DO ANYTHING, OR PREPARED TO DO ANYTHING TO END YOUR LIFE?: NO

## 2024-07-17 ASSESSMENT — PAIN DESCRIPTION - ORIENTATION
ORIENTATION: LEFT
ORIENTATION: LEFT
ORIENTATION_2: LEFT

## 2024-07-17 ASSESSMENT — PATIENT HEALTH QUESTIONNAIRE - PHQ9
SUM OF ALL RESPONSES TO PHQ9 QUESTIONS 1 & 2: 0
1. LITTLE INTEREST OR PLEASURE IN DOING THINGS: NOT AT ALL
2. FEELING DOWN, DEPRESSED OR HOPELESS: NOT AT ALL

## 2024-07-17 ASSESSMENT — PAIN - FUNCTIONAL ASSESSMENT
PAIN_FUNCTIONAL_ASSESSMENT: 0-10
PAIN_FUNCTIONAL_ASSESSMENT: 0-10

## 2024-07-17 NOTE — NURSING NOTE
Pt calm and cooperative through shift. Pt did not complain of pain. Pt on 3L NC and resting comfortably. Pt planning on eating dinner.

## 2024-07-17 NOTE — ED PROVIDER NOTES
EMERGENCY DEPARTMENT ENCOUNTER      Pt Name: Noemí Hinton  MRN: [id number]  Birthdate 7/27/1928  Date of evaluation: 7/17/2024  Provider: Zuhair Pardo DO    CHIEF COMPLAINT       Chief Complaint   Patient presents with    Back Pain    rib cage pain         HISTORY OF PRESENT ILLNESS    Noemí Hinton is a 89 y.o. female w/ PMHx of of HTN, Afib (rate control w/ atenolol), remote MI s/p PCI (4 cardiac and 2 L femoral stents), femoral/hip fx 2/2 recurrent falls neuropathy,and mixed dyslipidemia presenting to ED w/ acute L upper thoracic/rib pain w/ associated pleurisy starting yesterday evening. Denies known mechanism of accident/injury.  Described as sharp and localized, following L T4 dermatome approximate from just lateral midline back to the posterior midaxillary line. Pain elicited pain w/ movement but not palpation. Worse w/ deep inhalation though present upon holding breath. Some improvement w/ positonal changes (laying on R side or on supine back).   Per daughter, home oxygen (3L) is new, started x3 weeks ago by home nurse for increased SOB.    Denies remote Hx of chickenpox or shingles. Prescribed gabapentin without reported effectiveness.     ROS: Negative for fever/chills, lightheaded/dizzy, hearing/vision changes, anterior CP, radiating jaw/RUE pain, N/V/D/C, new skin rash/lesions, or numbness/tingling in BUE/BLE.    Shx: Pt lives with daughter (at bedside) who is scheduled to leave tomorrow (4-day vacation). Pre-set plans of temporary nursing home placement while daughter is traveling.           History provided by:  Patient and relative      Nursing Notes were reviewed.    PAST MEDICAL HISTORY     Past Medical History:   Diagnosis Date    Anxiety 09/14/2023    Arthritis     Atrial fibrillation (Multi) 08/24/2023    Breast mass, right 2019    Coronary artery disease due to lipid rich plaque 08/24/2023    GERD (gastroesophageal reflux disease) 08/24/2023    Hip fracture, left (Multi) 08/24/2023    History of  femur fracture     Left    HLD (hyperlipidemia)     Incontinence of urine     Myocardial infarction (Multi)     Neuropathy 2023    Sciatica          SURGICAL HISTORY       Past Surgical History:   Procedure Laterality Date     SECTION, CLASSIC      x 2    CORONARY ANGIOPLASTY WITH STENT PLACEMENT      4 stents in  and 2 stents in     HIP FRACTURE SURGERY Left     ORIF FEMUR FRACTURE Left          CURRENT MEDICATIONS       Current Discharge Medication List        CONTINUE these medications which have NOT CHANGED    Details   atenolol (Tenormin) 25 mg tablet Take 1 tablet (25 mg) by mouth once daily at bedtime.      gabapentin (Neurontin) 300 mg capsule Take 1 capsule (300 mg) by mouth 2 times a day.    Associated Diagnoses: Neuropathy      lidocaine (Lidoderm) 5 % patch APPLY TO LEFT OR RIGHT SHOULDER 1 PATCH TOPICALLY ONE TIME A DAY AS NEEDED (ON FOR 12 HOURS AND OFF FOR 12 HOURS)  Qty: 30 patch, Refills: 11    Associated Diagnoses: Back pain, unspecified back location, unspecified back pain laterality, unspecified chronicity      nystatin (Mycostatin) 100,000 unit/gram powder Apply 1 Application topically 2 times a day. Apply under breasts    Associated Diagnoses: Dermatitis      PharbetoL 325 mg tablet 1 TABLET BY MOUTH ONE TIME A DAY  Qty: 30 tablet, Refills: 11    Associated Diagnoses: Back pain, unspecified back location, unspecified back pain laterality, unspecified chronicity             ALLERGIES     Tramadol, Codeine, and Hydrocodone    FAMILY HISTORY       Family History   Problem Relation Name Age of Onset    Heart disease Mother          CAD, CHF    Coronary artery disease Mother      Heart failure Mother      Heart disease Father          had MI, CAD    Coronary artery disease Father      Heart attack Father            SOCIAL HISTORY       Social History     Socioeconomic History    Marital status:     Number of children: 2   Tobacco Use    Smoking status: Never    Smokeless  tobacco: Never   Vaping Use    Vaping status: Never Used   Substance and Sexual Activity    Alcohol use: Never    Drug use: Never   Social History Narrative    Typically lives home with daughter, but has been on respite care for the last 2 months     Social Determinants of Health     Financial Resource Strain: Low Risk  (7/17/2024)    Overall Financial Resource Strain (CARDIA)     Difficulty of Paying Living Expenses: Not hard at all   Transportation Needs: No Transportation Needs (7/17/2024)    PRAPARE - Transportation     Lack of Transportation (Medical): No     Lack of Transportation (Non-Medical): No   Housing Stability: Low Risk  (7/17/2024)    Housing Stability Vital Sign     Unable to Pay for Housing in the Last Year: No     Number of Times Moved in the Last Year: 1     Homeless in the Last Year: No       SCREENINGS                        PHYSICAL EXAM    (up to 7 for level 4, 8 or more for level 5)     ED Triage Vitals [07/17/24 1115]   Temperature Heart Rate Respirations BP   36.4 °C (97.5 °F) 83 17 166/77      Pulse Ox Temp Source Heart Rate Source Patient Position   98 % Temporal Monitor Lying      BP Location FiO2 (%)     Right arm --       Physical Exam  Constitutional:       General: She is awake. She is in acute distress (d/t pain).      Appearance: Normal appearance. She is not ill-appearing, toxic-appearing or diaphoretic.   HENT:      Head: Normocephalic and atraumatic.   Cardiovascular:      Rate and Rhythm: Normal rate and regular rhythm.      Heart sounds: Normal heart sounds.   Pulmonary:      Effort: Pulmonary effort is normal.      Breath sounds: Normal breath sounds.   Abdominal:      General: Abdomen is flat. There is no distension.      Palpations: Abdomen is soft.      Tenderness: There is no abdominal tenderness. There is no guarding or rebound.   Musculoskeletal:      Cervical back: Normal range of motion. No swelling, edema, erythema or crepitus. No pain with movement. Normal range of  motion.      Thoracic back: Spasms and tenderness present. No swelling, edema, signs of trauma or lacerations. Decreased range of motion.      Lumbar back: Normal.        Back:       Comments: Pain along L T4 dermatone from just lateral to midline back to just posterior midaxillary line. Elicited by movement but not palpation. No surrounding erythema, swelling, spasm, or laceration/injury.    Skin:     General: Skin is warm and dry.   Neurological:      General: No focal deficit present.      Mental Status: She is alert and oriented to person, place, and time. Mental status is at baseline.      Sensory: Sensation is intact.   Psychiatric:         Attention and Perception: Attention and perception normal.         Mood and Affect: Mood and affect normal.         Speech: Speech normal.         Behavior: Behavior normal. Behavior is cooperative.         Thought Content: Thought content normal.         Cognition and Memory: Cognition and memory normal.         Judgment: Judgment normal.      Comments: Pleasant and positive demeanor despite pain acuity          DIAGNOSTIC RESULTS     LABS:  Labs Reviewed   CBC WITH AUTO DIFFERENTIAL - Abnormal       Result Value    WBC 13.2 (*)     nRBC 0.0      RBC 5.06      Hemoglobin 15.2      Hematocrit 47.8 (*)     MCV 95      MCH 30.0      MCHC 31.8 (*)     RDW 13.8      Platelets 181      Neutrophils % 87.7      Immature Granulocytes %, Automated 0.4      Lymphocytes % 4.3      Monocytes % 7.0      Eosinophils % 0.2      Basophils % 0.4      Neutrophils Absolute 11.58 (*)     Immature Granulocytes Absolute, Automated 0.05      Lymphocytes Absolute 0.57 (*)     Monocytes Absolute 0.93 (*)     Eosinophils Absolute 0.03      Basophils Absolute 0.05     COMPREHENSIVE METABOLIC PANEL - Abnormal    Glucose 98      Sodium 140      Potassium 4.6      Chloride 105      Bicarbonate 26      Anion Gap 14      Urea Nitrogen 16      Creatinine 0.74      eGFR 75      Calcium 9.4      Albumin 3.7       Alkaline Phosphatase 71      Total Protein 7.0      AST 14      Bilirubin, Total 1.5 (*)     ALT 8     B-TYPE NATRIURETIC PEPTIDE - Abnormal     (*)     Narrative:        <100 pg/mL - Heart failure unlikely  100-299 pg/mL - Intermediate probability of acute heart                  failure exacerbation. Correlate with clinical                  context and patient history.    >=300 pg/mL - Heart Failure likely. Correlate with clinical                  context and patient history.    BNP testing is performed using different testing methodology at [facility] than at other system hospitals. Direct result comparisons should only be made within the same method.      PROTIME-INR - Abnormal    Protime 13.2 (*)     INR 1.2 (*)    SERIAL TROPONIN-INITIAL - Normal    Troponin I, High Sensitivity 12      Narrative:     Less than 99th percentile of normal range cutoff-  Female and children under 18 years old <14 ng/L; Male <21 ng/L: Negative  Repeat testing should be performed if clinically indicated.     Female and children under 18 years old 14-50 ng/L; Male 21-50 ng/L:  Consistent with possible cardiac damage and possible increased clinical   risk. Serial measurements may help to assess extent of myocardial damage.     >50 ng/L: Consistent with cardiac damage, increased clinical risk and  myocardial infarction. Serial measurements may help assess extent of   myocardial damage.      NOTE: Children less than 1 year old may have higher baseline troponin   levels and results should be interpreted in conjunction with the overall   clinical context.     NOTE: Troponin I testing is performed using a different   testing methodology at [facility] than at other   [facility]. Direct result comparisons should only   be made within the same method.   SERIAL TROPONIN, 1 HOUR - Normal    Troponin I, High Sensitivity 11      Narrative:     Less than 99th percentile of normal range cutoff-  Female  and children under 18 years old <14 ng/L; Male <21 ng/L: Negative  Repeat testing should be performed if clinically indicated.     Female and children under 18 years old 14-50 ng/L; Male 21-50 ng/L:  Consistent with possible cardiac damage and possible increased clinical   risk. Serial measurements may help to assess extent of myocardial damage.     >50 ng/L: Consistent with cardiac damage, increased clinical risk and  myocardial infarction. Serial measurements may help assess extent of   myocardial damage.      NOTE: Children less than 1 year old may have higher baseline troponin   levels and results should be interpreted in conjunction with the overall   clinical context.     NOTE: Troponin I testing is performed using a different   testing methodology at [facility] than at other   [facility]. Direct result comparisons should only   be made within the same method.   SARS-COV-2 PCR - Normal    Coronavirus 2019, PCR Not Detected      Narrative:     This assay has received FDA Emergency Use Authorization (EUA) and is only authorized for the duration of time that circumstances exist to justify the authorization of the emergency use of in vitro diagnostic tests for the detection of SARS-CoV-2 virus and/or diagnosis of COVID-19 infection under section 564(b)(1) of the Act, 21 U.S.C. 360bbb-3(b)(1). This assay is an in vitro diagnostic nucleic acid amplification test for the qualitative detection of SARS-CoV-2 from nasopharyngeal specimens and has been validated for use at [facility]. Negative results do not preclude COVID-19 infections and should not be used as the sole basis for diagnosis, treatment, or other management decisions.     TROPONIN SERIES- (INITIAL, 1 HR)    Narrative:     The following orders were created for panel order Troponin I Series, High Sensitivity (0, 1 HR).  Procedure                               Abnormality         Status                     ---------                                -----------         ------                     Troponin I, High Sensiti...[[id number]]  Normal              Final result               Troponin, High Sensitivi...[[id number]]  Normal              Final result                 Please view results for these tests on the individual orders.   RESPIRATORY VIRAL PANEL       All other labs were within normal range or not returned as of this dictation.    Imaging  CT chest wo IV contrast   Final Result   5 cm mass in the right breast.  Mammography is recommended.   Cholelithiasis.   Enlarged thyroid.  This is best evaluated with ultrasound.   11 mm nodule anteriorly in the right lower lobe.  This may represent   focal consolidation but a mass cannot be excluded and is best   evaluated with PET CT scan.   Bibasilar infiltrates with left pleural effusion.   Scoliosis of the thoracic spine with diffuse degenerative changes.  No   evidence of acute fracture.   Signed by Feliberto Hdz MD      CT thoracic spine wo IV contrast   Final Result   5 cm mass in the right breast.  Mammography is recommended.   Cholelithiasis.   Enlarged thyroid.  This is best evaluated with ultrasound.   11 mm nodule anteriorly in the right lower lobe.  This may represent   focal consolidation but a mass cannot be excluded and is best   evaluated with PET CT scan.   Bibasilar infiltrates with left pleural effusion.   Scoliosis of the thoracic spine with diffuse degenerative changes.  No   evidence of acute fracture.   Signed by Feliberto Hdz MD           Procedures  Procedures     EMERGENCY DEPARTMENT COURSE/MDM:     Diagnoses as of 07/17/24 1956   Pneumonia due to infectious organism, unspecified laterality, unspecified part of lung        Medical Decision Making  Pt is a 89 y.o female w/ PMHX of HTN, Afib (rate control w/ atenolol), remote MI s/p PCI (4 cardiac and 2 L femoral stents), femoral/hip fx 2/2 recurrent falls neuropathy, and mixed dyslipidemia presenting to ED w/  acute L upper thoracic/rib pain w/ associated pleurisy since yesterday evening. No known mechanism of action/injury. Not on current blood thinners. Hemodynamically stable upon ED arrival w/ HR 83 bpm, well perfused (SpO2 98% on NC 3L same as home).     Initial clinical suspicions include nerve impingement w/ Ddx of rib/vertebral fracture, PNA, or ACS, and PE.   For pain administered Robaxin 50 mg given codeine intolerance as well as Tylenol 975 mg and lidocaine 4% patch.     Cardiac work-up fairly unremarkable. Negative troponin. Elevated PT/INR (13.2/1.2) w/in pt baseline compared to prior. Elevated BNP (674) likely 2/2 known Afib. CMP wnl; no electrolyte imbalance or evidence of hepatorenal dysfunction.   ECG: NSR w/ L-axis deviation. Vent rate 81 bpm; : QTc 443. Evidence of previous inferior MI w/o new ST changes, T-wave inversions, or signs of ischemic changes compared to prior.   Leukocytosis (13.2) and elevated ANC (11.58) consistent with infection.  No anemia, thrombocytosis, or thrombocytopenia.    CT chest/thoracic: 5 cm mass in R breast. Cholelithiasis.  Enlarged thyroid.  Anterior RML L nodule (11 mm) consistent with focal consolidation VS mass breast evaluated with the additional scans.  Bibasilar infiltrates with L pleural effusion.  T-spine scoliosis with diffuse degenerative changes but no acute fractures.    Reassessed. Resting more comfortably in bed. Lidocaine and Tylenol ineffective for pain. Reviewed labs/imaging findings w/ pt and daughter. Both previously aware of incidental finding of 5 cm R breast mass (opting continued observation). Pt and daughter agreeable to hospital admit for IV Abx, pain management, and observation. Daughter voicing concern of admit timeframe (scheduled to leave out of town tomorrow AM and return Sunday). Medicine notified of admission. Advised daughter to speak to inpatient team/social work for proposed admission duration. Dual ABX of ceftriaxone and azithromycin  administered.  Additional COVID and respiratory viral panel orders placed per IM discretion.    Resident addendum:    89 female history of A-fib, hypertension, comes emergency with pleuritic back pain, reproducible on exam, patient not hypoxic, tachycardic, tachypneic, has been on oxygen supplementally for the last 3 weeks, concern for musculoskeletal etiology versus underlying etiology such as pneumonia, pleural effusion, CT T-spine ordered as well along with CT chest without contrast, cardiac workup.    Independent interpretation of labs, CBC had leukocytosis, chemistry is unremarkable, troponins were negative x 2, EKG showed no acute injury pattern.  BNP slightly elevated, she does have a pleural effusion, CT chest showed signs concerning for pneumonia, left-sided pleural effusion, Despec this is the cause of her pleuritic pain on the left, and the pneumonia is likely cause of her increased oxygen requirements over the last 3 weeks, she was treated with Tylenol, Robaxin for pain, Rocephin, azithromycin and lidocaine patches.  As well, was admitted to the hospital for further management.    Zuhair Pardo DO  PGY-4  Emergency Medicine    =================Attending note===============    The patient was seen by the resident/fellow.  I have personally performed a substantive portion of the encounter.  I have seen and examined the patient; agree with the workup, evaluation, MDM,   management and diagnosis.  The care plan has been discussed with the resident; I have reviewed the resident's note and agree with the documented findings.      This is a 89 y.o. female who presents to ER with left sided posterior thoracic pain.  Pain is worse with inspiration.  Started at 4 PM yesterday.  No injuries.  No blood thinners.  No abdominal pain.  No anterior chest pain.  No jaw pain.  No nausea or vomiting or diarrhea.  No history of similar issues.  She does have hypertension, hyperlipidemia.  No diabetes.  She does have A-fib.   She is not on blood thinners.  She does have 4 cardiac stents.  She also had a femoral stent.  She does have a history of recurrent falls, but none recently.  There is some posterior rib tenderness.  No step-offs or crepitance.  Heart regular.  Lungs clear.  Abdomen soft and nontender.  Moving extremities x 4.  No hip pain.  EKG: Normal sinus rhythm with a ventricular rate of 81.  .  QTc 443.  No ST changes.              ==========================================      Pt is a 89 y.o female w/ PMHX of HTN, Afib (rate control w/ atenolol), remote MI s/p PCI (4 cardiac and 2 L femoral stents), femoral/hip fx 2/2 recurrent falls neuropathy, and mixed dyslipidemia presenting to ED w/ acute L upper thoracic/rib pain w/ associated pleurisy since yesterday evening. No known mechanism of action/injury. Not on current blood thinners. Hemodynamically stable upon ED arrival w/ HR 83 bpm, well perfused (SpO2 98% on NC 3L same as home).     Initial clinical suspicions include but not limited to nerve impingement. Ddx of rib/vertebral fracture, ACS, and PE.   For pain administered Robaxin 50 mg given codeine intolerance as well as Tylenol 975 mg and lidocaine 4% patch.     ECG: NSR w/ L-axis deviation. Vent rate 81 bpm; : QTc 443. Evidence of previous inferior MI w/o new ST changes, T-wave inversions, or signs of ischemic changes compared to prior.   CT chest and thoracic spine w/o contrast ordered. Potential CT chest with contrast for PE rule out pending initial findings.   Cardiac workup including BNP, troponin, ECG 12-lead as well as CBC and CMP.    Patient and or family in agreement and understanding of treatment plan.  All questions answered.      I reviewed the case with the attending ED physician. The attending ED physician agrees with the plan. Patient and/or patient´s representative was counseled regarding labs, imaging, likely diagnosis, and plan. All questions were answered.    ED Medications administered  this visit:    Medications   lidocaine 4 % patch 1 patch (1 patch transdermal Medication Applied 7/17/24 1214)   cefTRIAXone (Rocephin) 1 g in dextrose (iso) IV 50 mL (1 g intravenous Not Given 7/17/24 1700)   azithromycin (Zithromax) in dextrose 5 % in water (D5W) 250 mL  mg (0 mg intravenous Stopped 7/17/24 1751)   atenolol (Tenormin) tablet 25 mg (has no administration in time range)   acetaminophen (Tylenol) tablet 975 mg (975 mg oral Given 7/17/24 1214)   methocarbamol (Robaxin) tablet 500 mg (500 mg oral Given 7/17/24 1220)       New Prescriptions from this visit:    Current Discharge Medication List          Follow-up:  No follow-up provider specified.      Final Impression:   1. Pneumonia due to infectious organism, unspecified laterality, unspecified part of lung          (Please note that portions of this note were completed with a voice recognition program.  Efforts were made to edit the dictations but occasionally words are mis-transcribed.)     Zuhair Pardo, DO  Resident  07/17/24 1956

## 2024-07-18 ENCOUNTER — APPOINTMENT (OUTPATIENT)
Dept: CARDIOLOGY | Facility: HOSPITAL | Age: 89
DRG: 175 | End: 2024-07-18
Payer: MEDICARE

## 2024-07-18 LAB
ALBUMIN SERPL BCP-MCNC: 3.4 G/DL (ref 3.4–5)
ALP SERPL-CCNC: 66 U/L (ref 33–136)
ALT SERPL W P-5'-P-CCNC: 8 U/L (ref 7–45)
ANION GAP SERPL CALC-SCNC: 13 MMOL/L (ref 10–20)
APPEARANCE UR: CLEAR
AST SERPL W P-5'-P-CCNC: 15 U/L (ref 9–39)
BACTERIA #/AREA URNS AUTO: ABNORMAL /HPF
BILIRUB DIRECT SERPL-MCNC: 0.4 MG/DL (ref 0–0.3)
BILIRUB SERPL-MCNC: 1.1 MG/DL (ref 0–1.2)
BILIRUB UR STRIP.AUTO-MCNC: NEGATIVE MG/DL
BUN SERPL-MCNC: 17 MG/DL (ref 6–23)
CALCIUM SERPL-MCNC: 8.9 MG/DL (ref 8.6–10.3)
CHLORIDE SERPL-SCNC: 105 MMOL/L (ref 98–107)
CO2 SERPL-SCNC: 23 MMOL/L (ref 21–32)
COLOR UR: YELLOW
CREAT SERPL-MCNC: 0.71 MG/DL (ref 0.5–1.05)
EGFRCR SERPLBLD CKD-EPI 2021: 78 ML/MIN/1.73M*2
ERYTHROCYTE [DISTWIDTH] IN BLOOD BY AUTOMATED COUNT: 13.9 % (ref 11.5–14.5)
GLUCOSE SERPL-MCNC: 156 MG/DL (ref 74–99)
GLUCOSE UR STRIP.AUTO-MCNC: NORMAL MG/DL
HCT VFR BLD AUTO: 46.4 % (ref 36–46)
HGB BLD-MCNC: 14.2 G/DL (ref 12–16)
KETONES UR STRIP.AUTO-MCNC: NEGATIVE MG/DL
LEGIONELLA AG UR QL: NEGATIVE
LEUKOCYTE ESTERASE UR QL STRIP.AUTO: ABNORMAL
MAGNESIUM SERPL-MCNC: 2.17 MG/DL (ref 1.6–2.4)
MCH RBC QN AUTO: 29.8 PG (ref 26–34)
MCHC RBC AUTO-ENTMCNC: 30.6 G/DL (ref 32–36)
MCV RBC AUTO: 97 FL (ref 80–100)
NITRITE UR QL STRIP.AUTO: NEGATIVE
NRBC BLD-RTO: 0 /100 WBCS (ref 0–0)
PH UR STRIP.AUTO: 6 [PH]
PHOSPHATE SERPL-MCNC: 4.2 MG/DL (ref 2.5–4.9)
PLATELET # BLD AUTO: 163 X10*3/UL (ref 150–450)
POTASSIUM SERPL-SCNC: 4.4 MMOL/L (ref 3.5–5.3)
PROCALCITONIN SERPL-MCNC: 0.12 NG/ML
PROT SERPL-MCNC: 6.1 G/DL (ref 6.4–8.2)
PROT UR STRIP.AUTO-MCNC: ABNORMAL MG/DL
RBC # BLD AUTO: 4.77 X10*6/UL (ref 4–5.2)
RBC # UR STRIP.AUTO: ABNORMAL /UL
RBC #/AREA URNS AUTO: ABNORMAL /HPF
S PNEUM AG UR QL: NEGATIVE
SODIUM SERPL-SCNC: 137 MMOL/L (ref 136–145)
SP GR UR STRIP.AUTO: >1.05
SQUAMOUS #/AREA URNS AUTO: ABNORMAL /HPF
UFH PPP CHRO-ACNC: 0.4 IU/ML
UFH PPP CHRO-ACNC: 0.5 IU/ML
UFH PPP CHRO-ACNC: 0.9 IU/ML
UFH PPP CHRO-ACNC: 0.9 IU/ML
UROBILINOGEN UR STRIP.AUTO-MCNC: NORMAL MG/DL
WBC # BLD AUTO: 8.4 X10*3/UL (ref 4.4–11.3)
WBC #/AREA URNS AUTO: ABNORMAL /HPF

## 2024-07-18 PROCEDURE — 2500000002 HC RX 250 W HCPCS SELF ADMINISTERED DRUGS (ALT 637 FOR MEDICARE OP, ALT 636 FOR OP/ED): Performed by: PHYSICIAN ASSISTANT

## 2024-07-18 PROCEDURE — 87086 URINE CULTURE/COLONY COUNT: CPT | Performed by: INTERNAL MEDICINE

## 2024-07-18 PROCEDURE — 36415 COLL VENOUS BLD VENIPUNCTURE: CPT | Performed by: PHYSICIAN ASSISTANT

## 2024-07-18 PROCEDURE — 87449 NOS EACH ORGANISM AG IA: CPT | Mod: STJLAB | Performed by: PHYSICIAN ASSISTANT

## 2024-07-18 PROCEDURE — 87899 AGENT NOS ASSAY W/OPTIC: CPT | Mod: STJLAB | Performed by: PHYSICIAN ASSISTANT

## 2024-07-18 PROCEDURE — 83735 ASSAY OF MAGNESIUM: CPT | Performed by: PHYSICIAN ASSISTANT

## 2024-07-18 PROCEDURE — 99222 1ST HOSP IP/OBS MODERATE 55: CPT | Performed by: INTERNAL MEDICINE

## 2024-07-18 PROCEDURE — 97161 PT EVAL LOW COMPLEX 20 MIN: CPT | Mod: GP

## 2024-07-18 PROCEDURE — 97530 THERAPEUTIC ACTIVITIES: CPT | Mod: GO

## 2024-07-18 PROCEDURE — 85520 HEPARIN ASSAY: CPT | Performed by: PHYSICIAN ASSISTANT

## 2024-07-18 PROCEDURE — 85520 HEPARIN ASSAY: CPT | Performed by: INTERNAL MEDICINE

## 2024-07-18 PROCEDURE — 97165 OT EVAL LOW COMPLEX 30 MIN: CPT | Mod: GO

## 2024-07-18 PROCEDURE — 93306 TTE W/DOPPLER COMPLETE: CPT

## 2024-07-18 PROCEDURE — 81001 URINALYSIS AUTO W/SCOPE: CPT | Performed by: INTERNAL MEDICINE

## 2024-07-18 PROCEDURE — 2500000001 HC RX 250 WO HCPCS SELF ADMINISTERED DRUGS (ALT 637 FOR MEDICARE OP): Performed by: PHYSICIAN ASSISTANT

## 2024-07-18 PROCEDURE — 94640 AIRWAY INHALATION TREATMENT: CPT

## 2024-07-18 PROCEDURE — 2500000005 HC RX 250 GENERAL PHARMACY W/O HCPCS: Performed by: PHYSICIAN ASSISTANT

## 2024-07-18 PROCEDURE — 93970 EXTREMITY STUDY: CPT | Performed by: INTERNAL MEDICINE

## 2024-07-18 PROCEDURE — 36415 COLL VENOUS BLD VENIPUNCTURE: CPT | Performed by: INTERNAL MEDICINE

## 2024-07-18 PROCEDURE — 93970 EXTREMITY STUDY: CPT

## 2024-07-18 PROCEDURE — 94664 DEMO&/EVAL PT USE INHALER: CPT

## 2024-07-18 PROCEDURE — 82248 BILIRUBIN DIRECT: CPT | Performed by: PHYSICIAN ASSISTANT

## 2024-07-18 PROCEDURE — 97530 THERAPEUTIC ACTIVITIES: CPT | Mod: GP

## 2024-07-18 PROCEDURE — 84100 ASSAY OF PHOSPHORUS: CPT | Performed by: PHYSICIAN ASSISTANT

## 2024-07-18 PROCEDURE — 85027 COMPLETE CBC AUTOMATED: CPT | Performed by: PHYSICIAN ASSISTANT

## 2024-07-18 PROCEDURE — 80048 BASIC METABOLIC PNL TOTAL CA: CPT | Performed by: PHYSICIAN ASSISTANT

## 2024-07-18 PROCEDURE — 1200000002 HC GENERAL ROOM WITH TELEMETRY DAILY

## 2024-07-18 PROCEDURE — 2500000004 HC RX 250 GENERAL PHARMACY W/ HCPCS (ALT 636 FOR OP/ED): Performed by: PHYSICIAN ASSISTANT

## 2024-07-18 RX ORDER — HEPARIN SODIUM 10000 [USP'U]/100ML
0-4500 INJECTION, SOLUTION INTRAVENOUS CONTINUOUS
Status: DISCONTINUED | OUTPATIENT
Start: 2024-07-18 | End: 2024-07-20

## 2024-07-18 RX ORDER — AZITHROMYCIN 500 MG/1
500 TABLET, FILM COATED ORAL EVERY 24 HOURS
Status: DISCONTINUED | OUTPATIENT
Start: 2024-07-18 | End: 2024-07-19

## 2024-07-18 RX ORDER — HEPARIN SODIUM 5000 [USP'U]/ML
2000-4000 INJECTION, SOLUTION INTRAVENOUS; SUBCUTANEOUS EVERY 4 HOURS PRN
Status: DISCONTINUED | OUTPATIENT
Start: 2024-07-18 | End: 2024-07-20

## 2024-07-18 RX ORDER — HEPARIN SODIUM 5000 [USP'U]/ML
80 INJECTION, SOLUTION INTRAVENOUS; SUBCUTANEOUS ONCE
Status: COMPLETED | OUTPATIENT
Start: 2024-07-18 | End: 2024-07-18

## 2024-07-18 ASSESSMENT — ENCOUNTER SYMPTOMS
DYSURIA: 0
BACK PAIN: 1
NAUSEA: 0
DIAPHORESIS: 0
APNEA: 0
SORE THROAT: 0
NERVOUS/ANXIOUS: 1
CONSTIPATION: 0
VOMITING: 0
PALPITATIONS: 0
HEADACHES: 0
DIARRHEA: 0
ABDOMINAL PAIN: 0
LIGHT-HEADEDNESS: 0
AGITATION: 0
HEMATURIA: 0
WHEEZING: 0
TROUBLE SWALLOWING: 0
DIZZINESS: 0
COUGH: 0
CHILLS: 0
SEIZURES: 0
SHORTNESS OF BREATH: 1
FEVER: 0

## 2024-07-18 ASSESSMENT — COGNITIVE AND FUNCTIONAL STATUS - GENERAL
HELP NEEDED FOR BATHING: A LOT
TURNING FROM BACK TO SIDE WHILE IN FLAT BAD: A LITTLE
MOVING TO AND FROM BED TO CHAIR: A LOT
DRESSING REGULAR UPPER BODY CLOTHING: A LOT
TURNING FROM BACK TO SIDE WHILE IN FLAT BAD: A LITTLE
HELP NEEDED FOR BATHING: A LOT
MOBILITY SCORE: 15
DRESSING REGULAR LOWER BODY CLOTHING: A LOT
PERSONAL GROOMING: A LITTLE
MOBILITY SCORE: 13
STANDING UP FROM CHAIR USING ARMS: A LOT
WALKING IN HOSPITAL ROOM: A LOT
MOVING FROM LYING ON BACK TO SITTING ON SIDE OF FLAT BED WITH BEDRAILS: A LITTLE
MOVING TO AND FROM BED TO CHAIR: A LOT
PERSONAL GROOMING: A LITTLE
DAILY ACTIVITIY SCORE: 16
TOILETING: A LOT
CLIMB 3 TO 5 STEPS WITH RAILING: TOTAL
CLIMB 3 TO 5 STEPS WITH RAILING: A LOT
STANDING UP FROM CHAIR USING ARMS: A LOT
WALKING IN HOSPITAL ROOM: A LOT
DRESSING REGULAR UPPER BODY CLOTHING: A LITTLE
DRESSING REGULAR LOWER BODY CLOTHING: A LOT
TOILETING: A LOT
DAILY ACTIVITIY SCORE: 15

## 2024-07-18 ASSESSMENT — PAIN SCALES - GENERAL
PAINLEVEL_OUTOF10: 0 - NO PAIN
PAINLEVEL_OUTOF10: 0 - NO PAIN
PAINLEVEL_OUTOF10: 1
PAINLEVEL_OUTOF10: 0 - NO PAIN
PAINLEVEL_OUTOF10: 0 - NO PAIN

## 2024-07-18 ASSESSMENT — ACTIVITIES OF DAILY LIVING (ADL)
BATHING_ASSISTANCE: MODERATE
ADL_ASSISTANCE: NEEDS ASSISTANCE
ADL_ASSISTANCE: NEEDS ASSISTANCE
LACK_OF_TRANSPORTATION: NO

## 2024-07-18 ASSESSMENT — PAIN - FUNCTIONAL ASSESSMENT
PAIN_FUNCTIONAL_ASSESSMENT: 0-10

## 2024-07-18 NOTE — CONSULTS
Reason For Consult  Breast mass and pE    History Of Present Illness  Noemí Hinton is a 89 y.o. female presenting with hx of breast mass and history of atrial fibrillation not on anticoagulation previously on Coumadin but was discontinued 1223 due to bladder hemorrhage.  She also has history of coronary artery disease right breast mass since age 89 however she for Goede biopsy and treatment.  She initially presented to ED 2024 with worsening hypoxia.  She underwent CT angio and was found to have an acute pulmonary emboli the left lower lobe upper lobe along with small left pleural effusion and large thyroid she does have a 1.3 cm nodular density at the base of the right lower lobe possible pulmonary nodule and multiple lobulated densities at the right breast measuring 5.5 x 4.7 cm heterogeneous mass concerning for primary breast malignancy.  Right lower extremity Dopplers shows an indeterminate DVT in the popliteal vein otherwise right leg is negative.  Left lower extremity shows an acute nonocclusive DVT in the proximal femoral vein age-indeterminate.  Oertli on a heparin drip.  She was there was some concern for RV strain and plan for repeat echo. Pt alarmed to hear about blood clots. States she may consider biopsy of breast once she speaks to her daughter.     Past Medical History  She has a past medical history of Anxiety (2023), Arthritis, Atrial fibrillation (Multi) (2023), Breast mass, right (), Coronary artery disease due to lipid rich plaque (2023), GERD (gastroesophageal reflux disease) (2023), Hip fracture, left (Multi) (2023), History of femur fracture, HLD (hyperlipidemia), Incontinence of urine, Myocardial infarction (Multi), Neuropathy (2023), and Sciatica.    Surgical History  She has a past surgical history that includes Coronary angioplasty with stent; Hip fracture surgery (Left); ORIF femur fracture (Left); and  section, classic.     Social  "History  She reports that she has never smoked. She has never used smokeless tobacco. She reports that she does not drink alcohol and does not use drugs.    Family History  Family History   Problem Relation Name Age of Onset    Heart disease Mother          CAD, CHF    Coronary artery disease Mother      Heart failure Mother      Heart disease Father          had MI, CAD    Coronary artery disease Father      Heart attack Father          Allergies  Tramadol, Codeine, and Hydrocodone    Review of Systems  Negative outside of HPI     Physical Exam  Gen: NAD  HEENT: atraumatic head, normocephalic, EOMI, conjuctiva normal  LUNG: no increased WOB  CV: No JVD. RRR  GI: soft, NT, ND  LE: no LE edema  Skin: no obvious rashes or lesions  Neuro: interactive, no focal deficits, limited in setting of acute illness  Psych: difficult to participate, normal affect         Last Recorded Vitals  Blood pressure 110/63, pulse 87, temperature 36.1 °C (97 °F), temperature source Temporal, resp. rate 20, height 1.727 m (5' 8\"), weight 55.8 kg (123 lb 0.3 oz), SpO2 95%.    Relevant Results  Results for orders placed or performed during the hospital encounter of 07/17/24 (from the past 24 hour(s))   Sars-CoV-2 PCR   Result Value Ref Range    Coronavirus 2019, PCR Not Detected Not Detected   D-dimer, VTE Exclusion   Result Value Ref Range    D-Dimer, Quantitative VTE Exclusion 2,319 (H) <=500 ng/mL FEU   Lactate   Result Value Ref Range    Lactate 1.2 0.4 - 2.0 mmol/L   Blood Culture    Specimen: Peripheral Venipuncture; Blood culture   Result Value Ref Range    Blood Culture Loaded on Instrument - Culture in progress    Blood Culture    Specimen: Peripheral Venipuncture; Blood culture   Result Value Ref Range    Blood Culture Loaded on Instrument - Culture in progress    Procalcitonin   Result Value Ref Range    Procalcitonin 0.12 (H) <=0.07 ng/mL   Legionella Antigen, Urine    Specimen: Urine   Result Value Ref Range    L. pneumophila " Urine Ag Negative Negative   Streptococcus pneumoniae Antigen, Urine    Specimen: Urine   Result Value Ref Range    Streptococcus pneumoniae Ag, Urine Negative Negative   CBC   Result Value Ref Range    WBC 8.4 4.4 - 11.3 x10*3/uL    nRBC 0.0 0.0 - 0.0 /100 WBCs    RBC 4.77 4.00 - 5.20 x10*6/uL    Hemoglobin 14.2 12.0 - 16.0 g/dL    Hematocrit 46.4 (H) 36.0 - 46.0 %    MCV 97 80 - 100 fL    MCH 29.8 26.0 - 34.0 pg    MCHC 30.6 (L) 32.0 - 36.0 g/dL    RDW 13.9 11.5 - 14.5 %    Platelets 163 150 - 450 x10*3/uL   Magnesium   Result Value Ref Range    Magnesium 2.17 1.60 - 2.40 mg/dL   Hepatic Function Panel   Result Value Ref Range    Albumin 3.4 3.4 - 5.0 g/dL    Bilirubin, Total 1.1 0.0 - 1.2 mg/dL    Bilirubin, Direct 0.4 (H) 0.0 - 0.3 mg/dL    Alkaline Phosphatase 66 33 - 136 U/L    ALT 8 7 - 45 U/L    AST 15 9 - 39 U/L    Total Protein 6.1 (L) 6.4 - 8.2 g/dL   Phosphorus   Result Value Ref Range    Phosphorus 4.2 2.5 - 4.9 mg/dL   Basic Metabolic Panel   Result Value Ref Range    Glucose 156 (H) 74 - 99 mg/dL    Sodium 137 136 - 145 mmol/L    Potassium 4.4 3.5 - 5.3 mmol/L    Chloride 105 98 - 107 mmol/L    Bicarbonate 23 21 - 32 mmol/L    Anion Gap 13 10 - 20 mmol/L    Urea Nitrogen 17 6 - 23 mg/dL    Creatinine 0.71 0.50 - 1.05 mg/dL    eGFR 78 >60 mL/min/1.73m*2    Calcium 8.9 8.6 - 10.3 mg/dL   Heparin Assay, UFH   Result Value Ref Range    Heparin Unfractionated 0.9 See Comment Below for Therapeutic Ranges IU/mL   Heparin Assay, UFH   Result Value Ref Range    Heparin Unfractionated 0.9 See Comment Below for Therapeutic Ranges IU/mL   Transthoracic Echo (TTE) Complete   Result Value Ref Range    BSA 1.64 m2   Lower extremity venous duplex bilateral   Result Value Ref Range    BSA 1.64 m2   Heparin Assay, UFH   Result Value Ref Range    Heparin Unfractionated 0.5 See Comment Below for Therapeutic Ranges IU/mL          Assessment/Plan     Pulmonary emobolis  Bilateral DVT  3.  Hx of afib    Patient now has an  acute thrombus pulmonary and bilateral DVT will need anticoagulation.  If she tolerates heparin without any bleeding complications or complications from bladder hemorrhage would recommend going back to warfarin if she tolerated however if no history of valvular atrial fibrillation, no contraindications to be on a DOAC and would recommend either Eliquis or Xarelto.  Unclear if DVT and PE provoked in the setting of malignancy she has a known breast mass however she has not pursued further assessment.  Would recommend continuing anticoagulation as long as the risk of clotting are greater than the risk of bleeding.    4. Breast mass    Discussed that there are options for treatment outside of surgery however biopsy would be needed to better understand if hormone positive.  Discussed endocrine therapy alone could be appropriate to help control disease. She states she may consider biopsy after discussing with daughter. She will need referral to breast surgeon if she is open for biopsy. Otherwise no further workup inpatient      I spent 60 minutes in the professional and overall care of this patient.      Holly Perales MD

## 2024-07-18 NOTE — H&P
"History Of Present Illness  Noemí Hinton is a 89 y.o. [facility] female with a medical history significant for atrial fibrillation not on anti-coagulation therapy (had been on coumadin for years but it was dc'd in 2023 due to bladder hemorrhage), CAD s/p cardiac stent, MI at age 75, GERD, right breat mass since age 89 (pt chose to forgo biopsy & treatment), arthritis, HLD, urinary incontinence, falls, left femur fracture, left hip fracture, neuropathy, and anxiety presented to [facility] on 2024 with complaint of hypoxia, SOB and mid back pain with inspiration.     The patient is alert to person, place, situation. She tells me that she has a visiting nurse come to see her through her insurance company once a month. She recommended oxygen since patient had been talking at night and her spO2 was found to be 88-89%. The patient was started on 2lpm nasal cannula approximately 2 weeks ago ordered by the visiting RN.  The patient says that yesterday she began to experience stabbing mid back pain and almost came to the ER via EMS but then opted not too. However, today she could not wait any longer. Her daughter says that the patient's spO2 was 86% on 2 lpm so she increased her oxygen to 2.5 lpm. She says she took the patient to see her PCP who recommended a PFT and CT, and the daughter was trying to get it scheduled at [facility] but needed a referral sent by PCP and checking insurance coverage. The patient rates her mid back pain at a 7-8/10 and describes it as stabbing in nature. The patient also complains of, \"heavy breathing,\" anxiety, pain in her feet secondary to neuropathy, abnormal weight loss (went from approx 140lbs to 124lbs in a few months), anorexia, SOB worse with exertion, urinary incontinence, urinary urgency, and sadness over the loss of her  8 months ago.      She tells me her  of 68 years  8 months ago, and she is grieving.  Her cardiologist is Dr Kvng Hein and she has an appointment " scheduled 24 to discuss possibly restarting coumadin therapy.    Patient denies recent fever/chills, headache, visual disturbance, dysphagia,  cough, cold symptoms, chest pain, palpitations, lightheadedness/dizziness, abdominal pain, N/V/D, rash, wounds, hematuria, melena, or dysuria.     Furthermore, she denies any history of CVA, kidney disease, liver disease, blood clots, CHF, diabetes, COVID-19, seizure, chronic lung disease, or sleep apnea.     Past Medical History  Past Medical History:   Diagnosis Date    Anxiety 2023    Arthritis     Atrial fibrillation (Multi) 2023    Breast mass, right 2019    Coronary artery disease due to lipid rich plaque 2023    GERD (gastroesophageal reflux disease) 2023    Hip fracture, left (Multi) 2023    History of femur fracture     Left    HLD (hyperlipidemia)     Incontinence of urine     Myocardial infarction (Multi)     Neuropathy 2023    Sciatica        Surgical History  Past Surgical History:   Procedure Laterality Date     SECTION, CLASSIC      x 2    CORONARY ANGIOPLASTY WITH STENT PLACEMENT      4 stents in  and 2 stents in     HIP FRACTURE SURGERY Left     ORIF FEMUR FRACTURE Left         Social History  Social History     Tobacco Use    Smoking status: Never    Smokeless tobacco: Never   Vaping Use    Vaping status: Never Used   Substance Use Topics    Alcohol use: Never    Drug use: Never        Family History  Family History   Problem Relation Name Age of Onset    Heart disease Mother          CAD, CHF    Coronary artery disease Mother      Heart failure Mother      Heart disease Father          had MI, CAD    Coronary artery disease Father      Heart attack Father          Allergies  Tramadol, Codeine, and Hydrocodone    Review of Systems   Constitutional:  Negative for chills, diaphoresis and fever.        Abnormal Weight loss   Poor appetite   Says weight dropped from approx 140 lbs to 124 lbs in a few  months    HENT:  Positive for hearing loss. Negative for sore throat and trouble swallowing.    Eyes:  Negative for visual disturbance.   Respiratory:  Positive for shortness of breath. Negative for apnea, cough and wheezing.    Cardiovascular:  Positive for leg swelling. Negative for chest pain and palpitations.   Gastrointestinal:  Negative for abdominal pain, constipation, diarrhea, nausea and vomiting.   Genitourinary:  Positive for urgency. Negative for dysuria and hematuria.        Incontinence    Musculoskeletal:  Positive for back pain and gait problem.   Neurological:  Negative for dizziness, seizures, light-headedness and headaches.   Psychiatric/Behavioral:  Negative for agitation and behavioral problems. The patient is nervous/anxious.         Talks in sleep in [place] or English   Sees  in dreams        Physical Exam  Constitutional:       General: She is awake. She is not in acute distress.     Appearance: Normal appearance. She is underweight. She is not ill-appearing, toxic-appearing or diaphoretic.      Interventions: Nasal cannula in place.   HENT:      Head: Normocephalic and atraumatic.      Nose: Nose normal.      Mouth/Throat:      Mouth: Mucous membranes are moist.   Eyes:      General: No scleral icterus.  Cardiovascular:      Rate and Rhythm: Normal rate. Rhythm irregularly irregular.      Heart sounds: No murmur heard.  Pulmonary:      Effort: No respiratory distress.      Breath sounds: No stridor. No wheezing or rhonchi.      Comments: Slightly diminished bilaterally   Abdominal:      General: There is no distension.      Palpations: Abdomen is soft.      Tenderness: There is no abdominal tenderness.      Comments: + bowel sounds   Musculoskeletal:      Cervical back: Neck supple.      Left lower leg: Edema present.      Comments: Moves all extremities x4  LLE slightly swollen  (no pitting) when compared to RLE (pt says this is chronic since LLE surgeries)   Skin:     General:  "Skin is warm and dry.      Findings: No rash.   Neurological:      General: No focal deficit present.      Mental Status: She is alert and oriented to person, place, and time. Mental status is at baseline.      Comments: Alert to person, place, situation   Psychiatric:         Mood and Affect: Mood normal.         Behavior: Behavior normal. Behavior is cooperative.         Thought Content: Thought content normal.         Judgment: Judgment normal.      Comments: Still grieving loss of  8 months ago, became tearful when speaking about him         Last Recorded Vitals  Visit Vitals  /82 (BP Location: Left arm, Patient Position: Lying)   Pulse 82   Temp 35.8 °C (96.4 °F) (Temporal)   Resp 17   Ht 1.727 m (5' 8\")   Wt 55.8 kg (123 lb 0.3 oz)   SpO2 92%   BMI 18.70 kg/m²   Smoking Status Never   BSA 1.64 m²        Relevant Results  Results for orders placed or performed during the hospital encounter of 07/17/24 (from the past 24 hour(s))   CBC and Auto Differential   Result Value Ref Range    WBC 13.2 (H) 4.4 - 11.3 x10*3/uL    nRBC 0.0 0.0 - 0.0 /100 WBCs    RBC 5.06 4.00 - 5.20 x10*6/uL    Hemoglobin 15.2 12.0 - 16.0 g/dL    Hematocrit 47.8 (H) 36.0 - 46.0 %    MCV 95 80 - 100 fL    MCH 30.0 26.0 - 34.0 pg    MCHC 31.8 (L) 32.0 - 36.0 g/dL    RDW 13.8 11.5 - 14.5 %    Platelets 181 150 - 450 x10*3/uL    Neutrophils % 87.7 40.0 - 80.0 %    Immature Granulocytes %, Automated 0.4 0.0 - 0.9 %    Lymphocytes % 4.3 13.0 - 44.0 %    Monocytes % 7.0 2.0 - 10.0 %    Eosinophils % 0.2 0.0 - 6.0 %    Basophils % 0.4 0.0 - 2.0 %    Neutrophils Absolute 11.58 (H) 1.60 - 5.50 x10*3/uL    Immature Granulocytes Absolute, Automated 0.05 0.00 - 0.50 x10*3/uL    Lymphocytes Absolute 0.57 (L) 0.80 - 3.00 x10*3/uL    Monocytes Absolute 0.93 (H) 0.05 - 0.80 x10*3/uL    Eosinophils Absolute 0.03 0.00 - 0.40 x10*3/uL    Basophils Absolute 0.05 0.00 - 0.10 x10*3/uL   Comprehensive metabolic panel   Result Value Ref Range    " Glucose 98 74 - 99 mg/dL    Sodium 140 136 - 145 mmol/L    Potassium 4.6 3.5 - 5.3 mmol/L    Chloride 105 98 - 107 mmol/L    Bicarbonate 26 21 - 32 mmol/L    Anion Gap 14 10 - 20 mmol/L    Urea Nitrogen 16 6 - 23 mg/dL    Creatinine 0.74 0.50 - 1.05 mg/dL    eGFR 75 >60 mL/min/1.73m*2    Calcium 9.4 8.6 - 10.3 mg/dL    Albumin 3.7 3.4 - 5.0 g/dL    Alkaline Phosphatase 71 33 - 136 U/L    Total Protein 7.0 6.4 - 8.2 g/dL    AST 14 9 - 39 U/L    Bilirubin, Total 1.5 (H) 0.0 - 1.2 mg/dL    ALT 8 7 - 45 U/L   B-Type Natriuretic Peptide   Result Value Ref Range     (H) 0 - 99 pg/mL   Troponin I, High Sensitivity, Initial   Result Value Ref Range    Troponin I, High Sensitivity 12 0 - 13 ng/L   Protime-INR   Result Value Ref Range    Protime 13.2 (H) 9.8 - 12.8 seconds    INR 1.2 (H) 0.9 - 1.1   Troponin, High Sensitivity, 1 Hour   Result Value Ref Range    Troponin I, High Sensitivity 11 0 - 13 ng/L   Sars-CoV-2 PCR   Result Value Ref Range    Coronavirus 2019, PCR Not Detected Not Detected   D-dimer, VTE Exclusion   Result Value Ref Range    D-Dimer, Quantitative VTE Exclusion 2,319 (H) <=500 ng/mL FEU   Lactate   Result Value Ref Range    Lactate 1.2 0.4 - 2.0 mmol/L   CBC   Result Value Ref Range    WBC 8.4 4.4 - 11.3 x10*3/uL    nRBC 0.0 0.0 - 0.0 /100 WBCs    RBC 4.77 4.00 - 5.20 x10*6/uL    Hemoglobin 14.2 12.0 - 16.0 g/dL    Hematocrit 46.4 (H) 36.0 - 46.0 %    MCV 97 80 - 100 fL    MCH 29.8 26.0 - 34.0 pg    MCHC 30.6 (L) 32.0 - 36.0 g/dL    RDW 13.9 11.5 - 14.5 %    Platelets 163 150 - 450 x10*3/uL   Magnesium   Result Value Ref Range    Magnesium 2.17 1.60 - 2.40 mg/dL   Hepatic Function Panel   Result Value Ref Range    Albumin 3.4 3.4 - 5.0 g/dL    Bilirubin, Total 1.1 0.0 - 1.2 mg/dL    Bilirubin, Direct 0.4 (H) 0.0 - 0.3 mg/dL    Alkaline Phosphatase 66 33 - 136 U/L    ALT 8 7 - 45 U/L    AST 15 9 - 39 U/L    Total Protein 6.1 (L) 6.4 - 8.2 g/dL   Phosphorus   Result Value Ref Range    Phosphorus 4.2  2.5 - 4.9 mg/dL   Basic Metabolic Panel   Result Value Ref Range    Glucose 156 (H) 74 - 99 mg/dL    Sodium 137 136 - 145 mmol/L    Potassium 4.4 3.5 - 5.3 mmol/L    Chloride 105 98 - 107 mmol/L    Bicarbonate 23 21 - 32 mmol/L    Anion Gap 13 10 - 20 mmol/L    Urea Nitrogen 17 6 - 23 mg/dL    Creatinine 0.71 0.50 - 1.05 mg/dL    eGFR 78 >60 mL/min/1.73m*2    Calcium 8.9 8.6 - 10.3 mg/dL   Heparin Assay, UFH   Result Value Ref Range    Heparin Unfractionated 0.9 See Comment Below for Therapeutic Ranges IU/mL      Imaging:  CT angio chest for pulmonary embolism   Final Result   1. Study degraded by motion artifact. Acute pulmonary emboli at the   left upper lobe. RV to LV ratio 1.07.   2. Cardiomegaly. Coronary artery calcifications.   3. Small left pleural effusion with atelectasis at the left lower   lobe. Mild atelectasis at the right lower lobe.   4. 1.3 cm nodular density at the anterior aspect at the base of the   right lower lobe, may be secondary to atelectasis versus pulmonary   nodule. Follow-up CT chest in 3 months versus PET/CT recommended for   further evaluation.   5. Enlarged thyroid gland. Please correlate with laboratory values.   Evaluation with ultrasound as clinically warranted.   6. Multiple lobulated densities at the right breast with a 5.4 x 4.7   cm heterogeneous mass, increased since prior CT 04/20/2021,   concerning for breast malignancy. [facility]   recommended for further evaluation.             MACRO:   Coty Hoyt discussed the significance and urgency of this   critical finding by telephone with  Kellee Miriam on 7/17/2024 at   11:59 pm.  (**-RCF-**) Findings:  See findings.             Critical Finding:  There are multiple critical findings. See   findings. notification was initiated on 7/17/2024 at 11:37 pm by   Coty Hoyt.  (**-YCF-**)                                 Signed by: Coty Hoyt 7/18/2024 12:04 AM   Dictation workstation:   [id number]       CT chest wo IV contrast   Final Result   5 cm mass in the right breast.  Mammography is recommended.   Cholelithiasis.   Enlarged thyroid.  This is best evaluated with ultrasound.   11 mm nodule anteriorly in the right lower lobe.  This may represent   focal consolidation but a mass cannot be excluded and is best   evaluated with PET CT scan.   Bibasilar infiltrates with left pleural effusion.   Scoliosis of the thoracic spine with diffuse degenerative changes.  No   evidence of acute fracture.   Signed by Feliberto Hdz MD      CT thoracic spine wo IV contrast   Final Result   5 cm mass in the right breast.  Mammography is recommended.   Cholelithiasis.   Enlarged thyroid.  This is best evaluated with ultrasound.   11 mm nodule anteriorly in the right lower lobe.  This may represent   focal consolidation but a mass cannot be excluded and is best   evaluated with PET CT scan.   Bibasilar infiltrates with left pleural effusion.   Scoliosis of the thoracic spine with diffuse degenerative changes.  No   evidence of acute fracture.   Signed by Feliberto Hdz MD      Transthoracic Echo (TTE) Complete    (Results Pending)     EKG:  No results found for this or any previous visit (from the past 4464 hour(s)).  Echo:  No results found for this or any previous visit.    Home Medications  Prior to Admission medications    Medication Sig Start Date End Date Taking? Authorizing Provider   atenolol (Tenormin) 25 mg tablet Take 1 tablet (25 mg) by mouth once daily at bedtime.   Yes Historical Provider, MD   gabapentin (Neurontin) 300 mg capsule Take 1 capsule (300 mg) by mouth 2 times a day.  Patient not taking: Reported on 7/17/2024 12/18/23   KELSEA Almodovar-CNP   lidocaine (Lidoderm) 5 % patch APPLY TO LEFT OR RIGHT SHOULDER 1 PATCH TOPICALLY ONE TIME A DAY AS NEEDED (ON FOR 12 HOURS AND OFF FOR 12 HOURS)  Patient not taking: Reported on 7/17/2024 10/26/23   Talon Cullen,    nystatin (Mycostatin) 100,000 unit/gram  powder Apply 1 Application topically 2 times a day. Apply under breasts  Patient not taking: Reported on 7/17/2024 12/14/23   Yelena Fletcher, APRN-CNP   PharbetoL 325 mg tablet 1 TABLET BY MOUTH ONE TIME A DAY  Patient not taking: Reported on 7/17/2024 10/26/23   Talon Cullen,    acetaminophen (Tylenol) 325 mg tablet Take 2 tablets (650 mg) by mouth every 4 hours if needed for mild pain (1 - 3) or fever (temp greater than 38.0 C).  7/17/24  Historical Provider, MD   DULoxetine (Cymbalta) 20 mg DR capsule Take 1 capsule (20 mg) by mouth once daily at bedtime.  7/17/24  Historical Provider, MD   ferrous sulfate 325 (65 Fe) MG EC tablet Take 65 mg by mouth once daily with a meal.  7/17/24  Historical Provider, MD       Medications  Scheduled medications  atenolol, 25 mg, oral, Nightly  azithromycin, 500 mg, intravenous, q24h  cefTRIAXone, 1 g, intravenous, q24h  docusate sodium, 100 mg, oral, BID  ipratropium-albuteroL, 3 mL, nebulization, q6h  methocarbamol, 500 mg, oral, q8h ABBY  oxygen, , inhalation, Continuous - Inhalation  pantoprazole, 40 mg, oral, Daily before breakfast   Or  pantoprazole, 40 mg, intravenous, Daily before breakfast      Continuous medications  heparin, 0-4,500 Units/hr, Last Rate: 900 Units/hr (07/18/24 0557)      PRN medications  acetaminophen, 650 mg, q4h PRN  bisacodyl, 10 mg, Daily PRN  heparin, 2,000-4,000 Units, q4h PRN  melatonin, 3 mg, Nightly PRN  ondansetron ODT, 4 mg, q8h PRN   Or  ondansetron, 4 mg, q8h PRN        Assessment/Plan   Principal Problem:    Pneumonia due to infectious organism, unspecified laterality, unspecified part of lung    1)Pulmonary Embolism and DVT with acute free floating thrombus in left common femoral vein, acute DVT in left femoral vein, profunda vein, age indeterminate DVT in left popliteal vein and peroneal vein, age indeterminate DVT in right popliteal vein     2) Bibasilar infiltrates seen on CT chest without contrast but not noted on CT chest  with contrast rather atelectasis is noted     3) Small left pleural effusion     4) 5 cm right breast mass (larger since CT done in 2021), likely breast cancer (patient has had mass since age 89, opted not to do any treatment or biopsy)    5) 11 mm pulmonary nodule RLL on CT chest wo contrast (on CT w/contrast seen as 1.3 cm nodular density)     6) Enlarged thyroid  (needs outpatient ultrasound)    7) Atrial fibrillation not on anti-coagulation since December 2023    8)Acute hypoxic respiratory failure (per ben pt has needed O2 for past 2 weeks)    9) Elevated d-dimer     10) Leukocytosis     11) Total bili 1.5 mildly elevated     12) Protein calorie malnutrition       Plan:  Pulm and cardiology consult (Dr Kvng Hein is the patient's cardiologist)   Morning labs  Tele   O2   Respiratory care eval  PT/OT  Fall, aspiration, and skin care precautions  Echo  Blood cxs x2  UA w/reflex cx  Pro-alba  Urinary antigens  Respiratory viral panel  Nutrition consult     VTE prophylaxis:   DVT prophylaxis: Therapeutic anticoagulation and SCDs    Medication reconciliation to be completed when home medications are verified by pharmacy.   See additional orders for further plan of care.   Further evaluation and management per attending and consulting physicians.      Code Status  DNR and No Intubation patient and daughter ok with tele    I spent 75 minutes in the professional and overall care of this patient.      Candice Anne PA-C  [facility]  Pager [phone]      Plan was explained to patient, and the patient verbalized understanding and agreement with the plan.           *Please note this report has been produced using speech recognition software and may contain errors related to that system including grammar, punctuation and spelling as well as words and phrases that may be inappropriate. If there are questions or concerns, please feel free to contact me to clarify.

## 2024-07-18 NOTE — PROGRESS NOTES
Occupational Therapy    Evaluation    Patient Name: Noemí Hinton  MRN: [id number]  Today's Date: 7/18/2024  Time Calculation  Start Time: 1109  Stop Time: 1135  Time Calculation (min): 26 min    Assessment  IP OT Assessment  OT Assessment: Pt benefits from continued OT services to improve ADL participation, functional mobility, and endurance  End of Session Communication: Bedside nurse  End of Session Patient Position: Up in room, Alarm on  Plan:  Treatment Interventions: ADL retraining, Functional transfer training, Endurance training, Patient/family training  OT Frequency: 3 times per week  OT Discharge Recommendations: Moderate intensity level of continued care  OT Recommended Transfer Status: Moderate assist, Assist of 1  OT - OK to Discharge: Yes (Pt OK to d/c once medically cleared)    Subjective   Current Problem:  1. Pneumonia due to infectious organism, unspecified laterality, unspecified part of lung        2. Mass of breast, unspecified laterality  Referral to [facility]      3. Acute pulmonary embolism, unspecified pulmonary embolism type, unspecified whether acute cor pulmonale present (Multi)  Transthoracic Echo (TTE) Complete    Transthoracic Echo (TTE) Complete    Lower extremity venous duplex bilateral    Lower extremity venous duplex bilateral    CANCELED: Transthoracic Echo (TTE) Complete    CANCELED: Transthoracic Echo (TTE) Complete      4. Elevated d-dimer        5. PE (pulmonary thromboembolism) (Multi)        6. Pain and swelling of left lower extremity  Lower extremity venous duplex bilateral    Lower extremity venous duplex bilateral        General:  General  Reason for Referral: Pt admitted for PNA  Referred By: Dr. Retana  Past Medical History Relevant to Rehab: HTN, A-fib, remote MI s/p PCI, femoral/hip fx, neuropathy, mixed dyslipidemia  Family/Caregiver Present: No  Co-Treatment: PT  Co-Treatment Reason: To enhance safety t/o functional mobilty and ADL  participation  Prior to Session Communication: Bedside nurse  Patient Position Received: Bed, 3 rail up, Alarm on  General Comment: Pt very pleasant and agreeable to therapy services this date  Precautions:     Vital Signs:  SpO2: 93 %  Pain:  Pain Assessment  Pain Assessment: 0-10  0-10 (Numeric) Pain Score: 0 - No pain    Objective   Cognition:  Overall Cognitive Status: Within Functional Limits  Attention: Within Functional Limits  Memory: Within Funtional Limits  Problem Solving: Within Functional Limits  Safety/Judgement: Within Functional Limits           Home Living:  Type of Home: House  Lives With: Adult children  Home Adaptive Equipment: Walker rolling or standard, Wheelchair-manual  Home Layout: Multi-level, Able to live on main level with bedroom/bathroom  Home Access: Stairs to enter with rails  Entrance Stairs-Number of Steps: 3  Bathroom Shower/Tub: Walk-in shower  Bathroom Toilet: Standard  Bathroom Equipment: Grab bars in shower, Shower chair with back   Prior Function:  Level of [place]: Needs assistance with ADLs, Needs assistance with homemaking  Receives Help From: Family  ADL Assistance: Needs assistance  Homemaking Assistance: Needs assistance  Ambulatory Assistance: Needs assistance (Ambulates w/ FWW in home vs w/c in community)  IADL History:  Homemaking Responsibilities: No  ADL:  Eating Assistance: Modified independent (Device)  Grooming Assistance: Stand by  Bathing Assistance: Moderate  UE Dressing Assistance: Minimal  LE Dressing Assistance: Maximal  Toileting Assistance with Device: Maximal  Functional Assistance: Moderate  Activity Tolerance:  Endurance: Tolerates 10 - 20 min exercise with multiple rests  Bed Mobility/Transfers: Bed Mobility  Bed Mobility: Yes (CGA)    Transfers  Transfer: Yes (Sit <--> stand at EOB w/ Min A x2)      Functional Mobility:  Functional Mobility  Functional Mobility Performed: Yes (Ambulates to chair w/ FWW and Min A x1)  Modalities:     IADL's:    Homemaking Responsibilities: No       Extremities: RUE   RUE : Within Functional Limits and LUE   LUE: Within Functional Limits    Outcome Measures: [facility] Daily Activity  Putting on and taking off regular lower body clothing: A lot  Bathing (including washing, rinsing, drying): A lot  Putting on and taking off regular upper body clothing: A little  Toileting, which includes using toilet, bedpan or urinal: A lot  Taking care of personal grooming such as brushing teeth: A little  Eating Meals: None  Daily Activity - Total Score: 16      Education Documentation  No documentation found.  Education Comments  No comments found.      Goals:   Encounter Problems       Encounter Problems (Active)       OT Problem       PATIENT WILL PERFORM CHAIR TO AND FROM BED TRANSFER WITH supervision (Progressing)       Start:  07/18/24    Expected End:  07/25/24            PATIENT WILL PERFORM COMMODE TRANSFER WITH supervision (Progressing)       Start:  07/18/24    Expected End:  07/25/24            PATIENT WILL COMPLETE TOILETTING HYGIENE WITH supervision (Progressing)       Start:  07/18/24    Expected End:  07/25/24

## 2024-07-18 NOTE — CARE PLAN
The patient's goals for the shift include      The clinical goals for the shift include see care plan

## 2024-07-18 NOTE — PROGRESS NOTES
Physical Therapy    Physical Therapy Evaluation & Treatment    Patient Name: Noemí Hinton  MRN: [id number]  Today's Date: 7/18/2024   Time Calculation  Start Time: 1110  Stop Time: 1133  Time Calculation (min): 23 min    Assessment/Plan   PT Assessment  PT Assessment Results: Decreased strength, Decreased range of motion, Decreased endurance, Impaired balance, Decreased mobility, Decreased safety awareness  Rehab Prognosis: Good  Evaluation/Treatment Tolerance: Patient tolerated treatment well, Patient limited by fatigue  Medical Staff Made Aware: Yes  End of Session Communication: Bedside nurse    Assessment Comment: Pt is a 89 y/o female admitted for PNA d/t infectious organism. Pt presents with decreased strength, endurance and balance. Pt able to tolerate bed mobility, transfers and taking steps from bed to chair. She is functioning below baseline level of function and will benefit from skilled therapy during stay to improve overall functional mobility and safety awareness. Upon discharge pt will benefit from moderate intensity therapy for continued improvement in functional mobility.     End of Session Patient Position: Up in chair, Alarm on (call light within reach)   IP OR SWING BED PT PLAN  Inpatient or Swing Bed: Inpatient  PT Plan  Treatment/Interventions: Bed mobility, Transfer training, Gait training, Stair training, Balance training, Neuromuscular re-education, Strengthening, Endurance training, Range of motion, Therapeutic exercise, Therapeutic activity, Home exercise program, Positioning, Postural re-education  PT Plan: Ongoing PT  PT Frequency: 4 times per week  PT Discharge Recommendations: Moderate intensity level of continued care  PT Recommended Transfer Status: Assist x1, Assistive device (min A)  PT - OK to Discharge: Yes (Once medically cleared to next level of care)    Subjective     General Visit Information:  General  Reason for Referral: P/w hypoxia, SOB and mid back pain with inspiration.  Pt admitted for PNA d/t infectious organism.  Referred By: Dr. Retana  Past Medical History Relevant to Rehab: atrial fibrillation not on anti-coagulation therapy (had been on coumadin for years but it was dc'd in 12/2023 due to bladder hemorrhage), CAD s/p cardiac stent, MI at age 75, GERD, right breat mass since age 89 (pt chose to forgo biopsy & treatment), arthritis, HLD, urinary incontinence, falls, left femur fracture, left hip fracture, neuropathy, and anxiety  Family/Caregiver Present: No  Co-Treatment: OT  Co-Treatment Reason: To maximize pt safety with functional mobility and discharge planning  Prior to Session Communication: Bedside nurse  Patient Position Received: Bed, 3 rail up, Alarm on  General Comment: Pt pleasant and agreeable to work with therapy. Pt became tearful during session when speaking about her late .  Home Living:  Home Living  Type of Home: House  Lives With: Adult children (Dtr)  Home Adaptive Equipment: Walker rolling or standard, Wheelchair-manual  Home Layout: Multi-level, Able to live on main level with bedroom/bathroom  Home Access: Stairs to enter with rails  Entrance Stairs-Number of Steps: 3 IZABEL with HR  Bathroom Shower/Tub: Walk-in shower  Bathroom Toilet: Standard  Bathroom Equipment: Grab bars in shower, Shower chair with back  Prior Level of Function:  Prior Function Per Pt/Caregiver Report  Level of [place]: Needs assistance with ADLs, Needs assistance with homemaking  Receives Help From: Family  ADL Assistance: Needs assistance  Homemaking Assistance: Needs assistance  Ambulatory Assistance: Needs assistance (Uses FWW in home SBA from dtr and uses manual w/c for longer distances)  Prior Function Comments: (-) falls, dtr provides transporation  Precautions:  Precautions  Medical Precautions: Fall precautions, Oxygen therapy device and L/min (4L O2 via NC; Aspiration and skin care precautions)  Vital Signs:  Vital Signs  Heart Rate: 83  Heart Rate Source:  Monitor  SpO2: 92 % (Post tx: 96 on 4L O2 via NC)    Objective   Pain:  Pain Assessment  Pain Assessment: 0-10  0-10 (Numeric) Pain Score: 0 - No pain  Cognition:  Cognition  Overall Cognitive Status: Within Functional Limits  Orientation Level: Oriented X4    General Assessments:  Activity Tolerance  Endurance: Tolerates 10 - 20 min exercise with multiple rests    Sensation  Light Touch: No apparent deficits    Static Sitting Balance  Static Sitting-Balance Support: Bilateral upper extremity supported, Feet supported  Static Sitting-Level of Assistance: Contact guard  Static Sitting-Comment/Number of Minutes: Demos episodes of retro leaning    Static Standing Balance  Static Standing-Balance Support: Bilateral upper extremity supported  Static Standing-Level of Assistance: Minimum assistance (x2)  Dynamic Standing Balance  Dynamic Standing-Balance Support: Bilateral upper extremity supported  Dynamic Standing-Comments: min A x2    Functional Assessments:    Bed Mobility  Bed Mobility: Yes  Bed Mobility 1  Bed Mobility 1: Supine to sitting, Scooting  Level of Assistance 1: Contact guard  Bed Mobility Comments 1: HOB elevated and use of bed HR. Increased time to complete transition.    Transfers  Transfer: Yes  Transfer 1  Transfer From 1: Bed to  Transfer to 1: Stand  Technique 1: Sit to stand  Transfer Device 1: Walker, Gait belt  Transfer Level of Assistance 1: Minimum assistance, +2  Transfers 2  Transfer From 2: Stand to  Transfer to 2: Chair with arms  Technique 2: Stand to sit  Transfer Device 2: Walker, Gait belt  Transfer Level of Assistance 2: Minimum assistance    Ambulation/Gait Training  Ambulation/Gait Training Performed: Yes  Ambulation/Gait Training 1  Surface 1: Level tile  Device 1: Rolling walker  Gait Support Devices: Gait belt  Assistance 1: Minimum assistance  Quality of Gait 1: Wide base of support, Diminished heel strike, Forward flexed posture, Antalgic (decreased haroon,  instability)  Comments/Distance (ft) 1: ~3 ft from bed to chair with step to gait pattern (Pt demos SOB with minimal activity, oxygen saturation assessed and WFL.)    Extremity/Trunk Assessments:     RLE   RLE : Within Functional Limits  LLE   LLE : Within Functional Limits    Treatments:  Pt able to tolerate the following activities during today's treatment session. Pt instructed/educated throughout the session on safety awareness, body mechanics and proper hand placement. Skilled monitoring of vitals throughout session for pt safety.     Therapeutic Activity  Therapeutic Activity Performed: Yes  Therapeutic Activity 1: Static sitting EOB for ~5 min with UE/LE support CGA with focus on orienting to position, maintaining upright posture and skilled monitoring of oxygen saturation. Pt demos episodes of retro leaning requiring VCs to maintain posture  Therapeutic Activity 2: Pt educated on proper use of incentive spirometer as pt demo'd difficulty with use. Pt instructed on how to properly use incentive spirometer and educated to complete 10x/day every hour.    Outcome Measures:  [facility] Basic Mobility  Turning from your back to your side while in a flat bed without using bedrails: A little  Moving from lying on your back to sitting on the side of a flat bed without using bedrails: A little  Moving to and from bed to chair (including a wheelchair): A lot  Standing up from a chair using your arms (e.g. wheelchair or bedside chair): A lot  To walk in hospital room: A lot  Climbing 3-5 steps with railing: Total  Basic Mobility - Total Score: 13    Encounter Problems       Encounter Problems (Active)       Balance       Pt will demonstrate static/dynamic sitting balance for >/= 5 min close supervision with UE/LE support without evidence of retro LOB.        Start:  07/18/24    Expected End:  08/01/24            Pt will demonstrate static/dynamic standing balance for >/= 3 min CGA without evidence of instability or LOB with  functional mobility tasks.         Start:  07/18/24    Expected End:  08/01/24               Mobility       Pt will be able to ambulate >/= 25 ft with least restrictive device CGA with good safety awareness.        Start:  07/18/24    Expected End:  08/01/24            Pt will complete supine, seated and standing exercises to maintain/improve overall strength with minimal verbal cues.         Start:  07/18/24    Expected End:  08/01/24               PT Transfers       Pt will be able to complete all bed mobility tasks with use of bed HR close supervision.        Start:  07/18/24    Expected End:  08/01/24            Pt will be able to complete all transfers with least restrictive device CGA demonstrating good safety awareness and proper body mechanics.        Start:  07/18/24    Expected End:  08/01/24                 Education Documentation  Precautions, taught by Enriqueta Ren PT at 7/18/2024 12:35 PM.  Learner: Patient  Readiness: Acceptance  Method: Explanation  Response: Verbalizes Understanding, Demonstrated Understanding, Needs Reinforcement    Body Mechanics, taught by Enriqueta Ren PT at 7/18/2024 12:35 PM.  Learner: Patient  Readiness: Acceptance  Method: Explanation  Response: Verbalizes Understanding, Demonstrated Understanding, Needs Reinforcement    Home Exercise Program, taught by Enriqueta Ren PT at 7/18/2024 12:35 PM.  Learner: Patient  Readiness: Acceptance  Method: Explanation  Response: Verbalizes Understanding, Demonstrated Understanding, Needs Reinforcement    Mobility Training, taught by Enriqueta Ren PT at 7/18/2024 12:35 PM.  Learner: Patient  Readiness: Acceptance  Method: Explanation  Response: Verbalizes Understanding, Demonstrated Understanding, Needs Reinforcement    Education Comments  No comments found.

## 2024-07-18 NOTE — PROGRESS NOTES
"Nutrition Initial Assessment:   Nutrition Assessment    Reason for Assessment: Provider consult order    Patient History: Noemí Hinton is a 89 y.o. female presenting to ED for hypoxia and shortness of breath.  Per cardiology notation pt has lost 16lbs recently after the loss of her lifelong .  Pt with workup notable for small left pleural effusion and nodules concerning for possible breast malignancy.    Past Medical History: trial fibrillation not on anti-coagulation therapy, CAD s/p cardiac stent, MI at age 75, GERD, right breast mass since age 89 , arthritis, HLD, urinary incontinence, falls, left femur fracture, left hip fracture, neuropathy, and anxiety     Nutrition History:  Energy Intake: Poor < 50 %  Food and Nutrient History: Pt states that she lives with her daughter who is always telling her she's not eating enough.  Pt admits that she doesn't have a good appetite and hasn't for quite some times but that she still tries to eat 3 times a day.  Notes she has not tried any oral supplement in the past to supplement calories as her weight has been decreasing but is agreeable if it is a chocolate flavor stating \"I'm a little bit of a chocolate freak.\"  Pt notes her appetite being even worse since her spouse recently passed away.  Food Allergies/Intolerances:  None  GI Symptoms: None  Oral Problems: None       Current Diet: Adult diet Regular    Anthropometrics:  Height: 172.7 cm (5' 8\")   Weight: 55.8 kg (123 lb 0.3 oz)   BMI (Calculated): 18.71             Weight Change %:  Weight History / % Weight Change: Record on 2/1/2024 from archives shows weight of 60.3kg, this is -4.5kg or 7.5% within the past 6 months  Significant Weight Loss: No (however unknown weight trends on record within the past 3 months)   12/08/23 11:01 12/09/23 00:01 07/17/24 07/18/24 06:05   Weight 61.7 kg (136 lb) 62.4 kg (137 lb 9.6 oz) 56.3 kg (124 lb 0.1 oz) 55.8 kg (123 lb 0.3 oz)     Pain Assessment: 0-10  0-10 (Numeric) Pain " Score: 0 - No pain  Pain Location: Rib cage  Pain Orientation: Left      Nutrition Significant Labs:  BMP Trend:   Results from last 7 days   Lab Units 07/18/24  0527 07/17/24  1450   GLUCOSE mg/dL 156* 98   CALCIUM mg/dL 8.9 9.4   SODIUM mmol/L 137 140   POTASSIUM mmol/L 4.4 4.6   CO2 mmol/L 23 26   CHLORIDE mmol/L 105 105   BUN mg/dL 17 16   CREATININE mg/dL 0.71 0.74     Nutrition Specific Medications:  Scheduled medications  atenolol, 25 mg, oral, Nightly  azithromycin, 500 mg, oral, q24h  cefTRIAXone, 1 g, intravenous, q24h  docusate sodium, 100 mg, oral, BID  ipratropium-albuteroL, 3 mL, nebulization, q6h  methocarbamol, 500 mg, oral, q8h ABBY  oxygen, , inhalation, Continuous - Inhalation  pantoprazole, 40 mg, oral, Daily before breakfast   Or  pantoprazole, 40 mg, intravenous, Daily before breakfast      Continuous medications  heparin, 0-4,500 Units/hr, Last Rate: 800 Units/hr (07/18/24 1633)        Nutrition Focused Physical Exam Findings:  Subcutaneous Fat Loss:   Orbital Fat Pads: Severe (dark circles, hollowing and loose skin)  Buccal Fat Pads: Severe (hollow, sunken and narrow face)  Triceps: Defer  Ribs: Defer  Muscle Wasting:  Temporalis: Severe (hollowed scooping depression)  Pectoralis (Clavicular Region): Severe (protruding prominent clavicle)  Deltoid/Trapezius: Severe (squared shoulders, acromion process prominent)  Interosseous: Defer  Trapezius/Infraspinatus/Supraspinatus (Scapular Region): Defer  Quadriceps: Defer  Gastrocnemius: Defer  Edema:  Edema: none  Physical Findings:  Hair: Negative  Eyes: Negative  Mouth: Negative  Nails: Negative  Skin: Positive (pale in appearance)     Estimated Needs:   Total Energy Estimated Needs (kCal):  (1560-1840kcal of 28-33kcal/kg)  Total Protein Estimated Needs (g):  (56-67g or 1.0-1.2g/kg pro)  Total Fluid Estimated Needs (mL):  (1ml/kcal or per MD recs)          Nutrition Diagnosis   Malnutrition Diagnosis  Patient has Malnutrition Diagnosis:  Yes  Diagnosis Status: New  Malnutrition Diagnosis: Severe malnutrition related to chronic disease or condition  As Evidenced by: acute on chronic nutritional stress in the setting of advanced age with indicators including intakes meeting <75% of needs for > 1 month, weight loss of 7.5% within the past 6 months, and NFPE showing signs of severe muscle wasting/fat loss.            Nutrition Interventions/Recommendations         Nutrition Prescription:  Individualized Nutrition Prescription Provided for : Recommend regular diet at this time for liberalized approach to promote protein/calorie intakes.        Nutrition Interventions:   Food and/or Nutrient Delivery Interventions  Interventions: Medical food supplement  Medical Food Supplement: Commercial beverage  Goal: Recommend Ensure Plus High Protein (350kcal, 20g pro) BID iwth breakfast and dinner and Magic Cup (290kcal, 9g pro) with lunch    Coordination of Nutrition Care by a Nutrition Professional  Collaboration and Referral of Nutrition Care: Collaboration by nutrition professional with other providers  Goal: Candice Pimentel PA-C       Nutrition Monitoring and Evaluation   Food/Nutrient Related History Monitoring  Monitoring and Evaluation Plan: Fluid intake, Amount of food  Fluid Intake: Estimated fluid intake  Criteria: Goal to consume >75% of fluids provided  Amount of Food: Estimated amout of food  Criteria: Goal to consume >50% of meals provided    Body Composition/Growth/Weight History  Monitoring and Evaluation Plan: Weight  Weight: Measured weight  Criteria: maintain stable weight    Biochemical Data, Medical Tests and Procedures  Monitoring and Evaluation Plan: Electrolyte/renal panel  Electrolyte and Renal Panel: Sodium, Chloride  Criteria: maintain WNR            Time Spent/Follow-up Reminder:   Time Spent (min): 60 minutes  Last Date of Nutrition Visit: 07/18/24  Nutrition Follow-Up Needed?: 3-8 days  Follow up Comment: [facility] - severe  mal

## 2024-07-18 NOTE — PROGRESS NOTES
07/18/24 1148   Discharge Planning   Living Arrangements Children  (lives with daughter Noemí [phone] left 10:33 AM)   Support Systems Children   Assistance Needed PTA; Independent w/ ADL's, use walker, wheelchair for long distance, daughter asst w/IADL's   Type of Residence Private residence  (demo correct)   Home or Post Acute Services In home services   Expected Discharge Disposition  Services   Does the patient need discharge transport arranged? No   Financial Resource Strain   How hard is it for you to pay for the very basics like food, housing, medical care, and heating? Not very   Housing Stability   In the last 12 months, was there a time when you were not able to pay the mortgage or rent on time? N   In the past 12 months, how many times have you moved where you were living? 1   At any time in the past 12 months, were you homeless or living in a shelter (including now)? N   Transportation Needs   In the past 12 months, has lack of transportation kept you from medical appointments or from getting medications? no  (PCP listed, daughter-n-law provides transport to medical appointments)   In the past 12 months, has lack of transportation kept you from meetings, work, or from getting things needed for daily living? No     Care Coordinator Note:  Patient's daughter called to discuss discharge planning needs, VM left, but able to speak to patient via mobile, phone, explained to patient my role as care coordinator, working remotely today, I don't think patient understood , but was able to retreive some information from patient. Patient stated her daughter was on a 4 day vacation, will return on Sunday, the initial  plan was for patient to go to respite while patient's daughter was on vacation. Informed patient PT/OT was assigned to see her today, patient verbalized understanding  Plan: IV abx d/t pneumonia; Cardiology consulted  Status Inpatient  Payor: [employer]   Disposition: Home; PT/OT rec  pending  Selena WICKN, RN [facility]

## 2024-07-18 NOTE — NURSING NOTE
0700: Assumed care of pt. At this time.    0945: Heparin assay released at this time. Will await results to titrate heparin drip as ordered. Will follow.    1055: PT. Heparain assay resulted at this time and was 0.9- Per orders the pt. Heparin gtt decreased to 8ml/hr. Will have next assay at 1500. Pt. Still has not had a therapeutic draw yet. Will follow    1320: PT. Taken to Biometrics at this time. PT. Transported with heparin gtt infusing. Will follow for return of the pt.    1435: PT. Returned from biometrics at this time. PT. Still to have venous duplex. Will follow for results.    1600: The pt. Heparin assay 0.5 at this time and therapeutic. No changes at this time. The pt. To have next assay at 2000 today. Will follow this pt. Throughout the duration of this shift.    1720: Dr. Cardozo at the bedside for hematology consult at this time. Will reach out to daughter prior to this shift ending and let her know what was discussed. Pt. Will likely follow up outpatient and if she is interested can have biopsy done with possible treatment. Call light in reach and bed in lowest position.     1745: Dr. Fernandez at the bedside to meet with the pt. This nurse informed MD that the pt. Has a daughter who is typically very involved but out of town at this time. The pt. Daughter to be notified regarding the conversations that were had.     *After visit from both doctors this nurse approached by family member who arrived later and they are voicing many concerns about the fact that the pt. Was unaware of nodules that are likely cancer but the pt. Did not know as they did not want her to worry since there would not be any work up due to the pt. Age. This information to be passed along in report that the pt. Family is asking that all information regarding any new concerning findings goes through the daughter, Hanna- rather than the pt. To avoid anxiety surrounding the topic. This nurse reassured the pt. And was able to comfort her  that she is being treated in the way she needs to be. No further questions or concerns at this time. This nurse to call Hanna lozano, prior to shift change to ensure she is aware of the discussions at the bedside today.     Update- Attempted to call Hanna at this time and she did not answer. This nurse left voicemail and will await a call back for further information.

## 2024-07-18 NOTE — CONSULTS
Consults    I26.9 Concern for pulmonary embolism with RV strain  I25.1 Coronary disease-remote LAD stents 2011 normal cath 2016 ACS with right coronary PDA stent LAD angioplasty and diagonal angioplasty 2021  I48.91 Atrial fibrillation for years on Coumadin declined Eliquis stopped Coumadin after recurrent bladder hemorrhages and after fall with right femur fracture at age 89  I10.0 Hypertension    Anticoagulation  Echo hopefully with RV strain looking for hemodynamic sequela I from pulmonary emboli  Prefer Eliquis in the setting as the anticoagulant of choice    History Of Present Illness:    Noemí Hinton is a 89 y.o. female presenting with possible pulmonary embolism, atrial fibrillation on Coumadin for years but discontinued in December 2023 due to bladder hemorrhage.  Remote history of coronary artery disease status post stent myocardial infarction age 75, right breast mass but declined biopsy and treatment at age 89 urinary incontinence in addition history of major fall with left femur fracture and in the past had been felt at increased risk for anticoagulation after the fall and after bladder hemorrhage presented with hypoxia dyspnea and mid back pain and pain worse with inspiration.  Initially her saturations were 88 to 89% was started by home visiting RN on oxygen 2 weeks previously later yesterday her saturation was 86% on 2 L and her PCP saw her ordering CT try to schedule it at the [facility] but there was problems getting insurance coverage.  She sought medical attention in the ED here her weight has declined 16 pounds to 124 in addition she recently lost her lifelong ..    I am familiar with her as his doctor neurology and took over for 4 Dr. Camacho when he left private practice.  There are multiple consult notes by me including when she had the femur fracture requiring surgery in August 2023 she has a history of 4 coronary stents in 2013 she has dyslipidemia hypertension by history her  previous echo has been stable    Her most recent coronary event she came to this hospital and Dr. Eugene Walker intervened on the right coronary artery she had 99% right coronary PDA 40 to 50% ostial LAD and 99% in-stent restenosis of the LAD after the diagonal PCI of the PDA with a resolute 2.5 x 22 drug-eluting stent angioplasty of the LAD with 1 stent and 1 bifurcating balloon angioplasty in 2021    In 2016 the late Dr. Gina Godinez did a cardiac cath in 2016 showing patent stents with no restenosis prior to right hip replacement by Dr. Victoria her initial stents were many years ago by Dr. Carrizales who is no longer in practice    At this juncture she is on heparin and I would get an echo looking for RV strain and signs of apical sparing in the literature strain is quite useful of the free wall of the right ventricle    PMH:  CAD s/p PCI x 4 stents in 2013 and PCI with 2 stents 2021, HTN, HLD, atrial fibrillation on coumadin, OA, GERD, anxiety and sciatica  PSH: PCI with stents, hip surgery  Social history: Denies tobacco, alcohol, or illicit drug use   Family past medical history: Reviewed noncontributory      Past Medical History:   Diagnosis Date    Anxiety 09/14/2023    Arthritis     Atrial fibrillation (Multi) 08/24/2023    Breast mass, right 2019    Coronary artery disease due to lipid rich plaque 08/24/2023    GERD (gastroesophageal reflux disease) 08/24/2023    Hip fracture, left (Multi) 08/24/2023    History of femur fracture     Left    HLD (hyperlipidemia)     Incontinence of urine     Myocardial infarction (Multi)     Neuropathy 09/21/2023    Sciatica        Patient Active Problem List    Diagnosis Date Noted    Pneumonia due to infectious organism, unspecified laterality, unspecified part of lung 07/17/2024    Lower abdominal pain 12/09/2023    Other hydronephrosis 12/09/2023    Hematuria 12/08/2023    Dysuria 12/08/2023    Hematoma of bladder wall 12/08/2023    Pressure injury of skin of buttock  12/01/2023    Difficulty in walking 11/30/2023    Supratherapeutic INR 10/10/2023    Neuropathy 09/21/2023    Anxiety 09/14/2023    Diarrhea 09/07/2023    Urinary frequency 09/07/2023    Shoulder pain, left 08/30/2023    Coronary artery disease due to lipid rich plaque 08/24/2023    Primary hypertension 08/24/2023    Atrial fibrillation (Multi) 08/24/2023    GERD (gastroesophageal reflux disease) 08/24/2023    Hip fracture, left (Multi) 08/24/2023       No current facility-administered medications on file prior to encounter.     Current Outpatient Medications on File Prior to Encounter   Medication Sig Dispense Refill    atenolol (Tenormin) 25 mg tablet Take 1 tablet (25 mg) by mouth once daily at bedtime.      gabapentin (Neurontin) 300 mg capsule Take 1 capsule (300 mg) by mouth 2 times a day. (Patient not taking: Reported on 7/17/2024)      lidocaine (Lidoderm) 5 % patch APPLY TO LEFT OR RIGHT SHOULDER 1 PATCH TOPICALLY ONE TIME A DAY AS NEEDED (ON FOR 12 HOURS AND OFF FOR 12 HOURS) (Patient not taking: Reported on 7/17/2024) 30 patch 11    nystatin (Mycostatin) 100,000 unit/gram powder Apply 1 Application topically 2 times a day. Apply under breasts (Patient not taking: Reported on 7/17/2024)      PharbetoL 325 mg tablet 1 TABLET BY MOUTH ONE TIME A DAY (Patient not taking: Reported on 7/17/2024) 30 tablet 11    [DISCONTINUED] acetaminophen (Tylenol) 325 mg tablet Take 2 tablets (650 mg) by mouth every 4 hours if needed for mild pain (1 - 3) or fever (temp greater than 38.0 C).      [DISCONTINUED] DULoxetine (Cymbalta) 20 mg DR capsule Take 1 capsule (20 mg) by mouth once daily at bedtime.      [DISCONTINUED] ferrous sulfate 325 (65 Fe) MG EC tablet Take 65 mg by mouth once daily with a meal.         Family History   Problem Relation Name Age of Onset    Heart disease Mother          CAD, CHF    Coronary artery disease Mother      Heart failure Mother      Heart disease Father          had MI, CAD    Coronary  artery disease Father      Heart attack Father         Past Surgical History:   Procedure Laterality Date     SECTION, CLASSIC      x 2    CORONARY ANGIOPLASTY WITH STENT PLACEMENT      4 stents in  and 2 stents in     HIP FRACTURE SURGERY Left     ORIF FEMUR FRACTURE Left                Last Recorded Vitals:  Vitals:    24 0119 24 0400 24 0605 24 0827   BP:  129/82  125/78   BP Location:  Left arm     Patient Position:  Lying     Pulse:  82  77   Resp:  17  20   Temp:  35.8 °C (96.4 °F)  35.9 °C (96.6 °F)   TempSrc:  Temporal  Temporal   SpO2: 93% 92%  90%   Weight:   55.8 kg (123 lb 0.3 oz)    Height:           Last Labs:  CBC - 2024:  5:27 AM  8.4 14.2 163    46.4      CMP - 2024:  5:27 AM  8.9 6.1 15 --- 1.1   4.2 3.4 8 66      PTT - No results in last year.  1.2   13.2 _     Troponin I, High Sensitivity   Date/Time Value Ref Range Status   2024 04:17 PM 11 0 - 13 ng/L Final   2024 02:50 PM 12 0 - 13 ng/L Final   2023 01:04 PM 5 0 - 13 ng/L Final     BNP   Date/Time Value Ref Range Status   2024 02:50  (H) 0 - 99 pg/mL Final     Hemoglobin A1C   Date/Time Value Ref Range Status   2021 06:33 AM 5.9 % Final     Comment:          Diagnosis of Diabetes-Adults   Non-Diabetic: < or = 5.6%   Increased risk for developing diabetes: 5.7-6.4%   Diagnostic of diabetes: > or = 6.5%  .       Monitoring of Diabetes                Age (y)     Therapeutic Goal (%)   Adults:          >18           <7.0   Pediatrics:    13-18           <7.5                   7-12           <8.0                   0- 6            7.5-8.5   American Diabetes Association. Diabetes Care 33(S1), 2010.       VLDL   Date/Time Value Ref Range Status   2021 06:33 AM 19 0 - 40 mg/dL Final      Last I/O:  I/O last 3 completed shifts:  In: 47.3 (0.8 mL/kg) [I.V.:47.3 (0.8 mL/kg)]  Out: 150 (2.7 mL/kg) [Urine:150 (0.1 mL/kg/hr)]  Weight: 55.8 kg     Past Cardiology  "Tests (Last 3 Years):  EKG:  ECG 12 lead 2023    Echo:  No results found for this or any previous visit from the past 1095 days.    Ejection Fractions:  No results found for: \"EF\"  Cath:  No results found for this or any previous visit from the past 1095 days.    Stress Test:  No results found for this or any previous visit from the past 1095 days.    Cardiac Imaging:  No results found for this or any previous visit from the past 1095 days.      Past Medical History:  She has a past medical history of Anxiety (2023), Arthritis, Atrial fibrillation (Multi) (2023), Breast mass, right (), Coronary artery disease due to lipid rich plaque (2023), GERD (gastroesophageal reflux disease) (2023), Hip fracture, left (Multi) (2023), History of femur fracture, HLD (hyperlipidemia), Incontinence of urine, Myocardial infarction (Multi), Neuropathy (2023), and Sciatica.    Past Surgical History:  She has a past surgical history that includes Coronary angioplasty with stent; Hip fracture surgery (Left); ORIF femur fracture (Left); and  section, classic.      Social History:  She reports that she has never smoked. She has never used smokeless tobacco. She reports that she does not drink alcohol and does not use drugs.    Family History:  Family History   Problem Relation Name Age of Onset    Heart disease Mother          CAD, CHF    Coronary artery disease Mother      Heart failure Mother      Heart disease Father          had MI, CAD    Coronary artery disease Father      Heart attack Father          Allergies:  Tramadol, Codeine, and Hydrocodone    Inpatient Medications:  Scheduled medications   Medication Dose Route Frequency    atenolol  25 mg oral Nightly    azithromycin  500 mg intravenous q24h    cefTRIAXone  1 g intravenous q24h    docusate sodium  100 mg oral BID    ipratropium-albuteroL  3 mL nebulization q6h    methocarbamol  500 mg oral q8h ABBY    oxygen   inhalation " Continuous - Inhalation    pantoprazole  40 mg oral Daily before breakfast    Or    pantoprazole  40 mg intravenous Daily before breakfast     PRN medications   Medication    acetaminophen    bisacodyl    heparin    melatonin    ondansetron ODT    Or    ondansetron     Continuous Medications   Medication Dose Last Rate    heparin  0-4,500 Units/hr 900 Units/hr (07/18/24 0557)     Outpatient Medications:  Current Outpatient Medications   Medication Instructions    atenolol (TENORMIN) 25 mg, oral, Nightly    gabapentin (NEURONTIN) 300 mg, oral, 2 times daily    lidocaine (Lidoderm) 5 % patch APPLY TO LEFT OR RIGHT SHOULDER 1 PATCH TOPICALLY ONE TIME A DAY AS NEEDED (ON FOR 12 HOURS AND OFF FOR 12 HOURS)    nystatin (Mycostatin) 100,000 unit/gram powder 1 Application, Topical, 2 times daily, Apply under breasts    PharbetoL 325 mg, oral, Daily     Results for orders placed or performed during the hospital encounter of 07/17/24 (from the past 96 hour(s))   CBC and Auto Differential   Result Value Ref Range    WBC 13.2 (H) 4.4 - 11.3 x10*3/uL    nRBC 0.0 0.0 - 0.0 /100 WBCs    RBC 5.06 4.00 - 5.20 x10*6/uL    Hemoglobin 15.2 12.0 - 16.0 g/dL    Hematocrit 47.8 (H) 36.0 - 46.0 %    MCV 95 80 - 100 fL    MCH 30.0 26.0 - 34.0 pg    MCHC 31.8 (L) 32.0 - 36.0 g/dL    RDW 13.8 11.5 - 14.5 %    Platelets 181 150 - 450 x10*3/uL    Neutrophils % 87.7 40.0 - 80.0 %    Immature Granulocytes %, Automated 0.4 0.0 - 0.9 %    Lymphocytes % 4.3 13.0 - 44.0 %    Monocytes % 7.0 2.0 - 10.0 %    Eosinophils % 0.2 0.0 - 6.0 %    Basophils % 0.4 0.0 - 2.0 %    Neutrophils Absolute 11.58 (H) 1.60 - 5.50 x10*3/uL    Immature Granulocytes Absolute, Automated 0.05 0.00 - 0.50 x10*3/uL    Lymphocytes Absolute 0.57 (L) 0.80 - 3.00 x10*3/uL    Monocytes Absolute 0.93 (H) 0.05 - 0.80 x10*3/uL    Eosinophils Absolute 0.03 0.00 - 0.40 x10*3/uL    Basophils Absolute 0.05 0.00 - 0.10 x10*3/uL   Comprehensive metabolic panel   Result Value Ref Range     Glucose 98 74 - 99 mg/dL    Sodium 140 136 - 145 mmol/L    Potassium 4.6 3.5 - 5.3 mmol/L    Chloride 105 98 - 107 mmol/L    Bicarbonate 26 21 - 32 mmol/L    Anion Gap 14 10 - 20 mmol/L    Urea Nitrogen 16 6 - 23 mg/dL    Creatinine 0.74 0.50 - 1.05 mg/dL    eGFR 75 >60 mL/min/1.73m*2    Calcium 9.4 8.6 - 10.3 mg/dL    Albumin 3.7 3.4 - 5.0 g/dL    Alkaline Phosphatase 71 33 - 136 U/L    Total Protein 7.0 6.4 - 8.2 g/dL    AST 14 9 - 39 U/L    Bilirubin, Total 1.5 (H) 0.0 - 1.2 mg/dL    ALT 8 7 - 45 U/L   B-Type Natriuretic Peptide   Result Value Ref Range     (H) 0 - 99 pg/mL   Troponin I, High Sensitivity, Initial   Result Value Ref Range    Troponin I, High Sensitivity 12 0 - 13 ng/L   Protime-INR   Result Value Ref Range    Protime 13.2 (H) 9.8 - 12.8 seconds    INR 1.2 (H) 0.9 - 1.1   Troponin, High Sensitivity, 1 Hour   Result Value Ref Range    Troponin I, High Sensitivity 11 0 - 13 ng/L   Sars-CoV-2 PCR   Result Value Ref Range    Coronavirus 2019, PCR Not Detected Not Detected   D-dimer, VTE Exclusion   Result Value Ref Range    D-Dimer, Quantitative VTE Exclusion 2,319 (H) <=500 ng/mL FEU   Lactate   Result Value Ref Range    Lactate 1.2 0.4 - 2.0 mmol/L   CBC   Result Value Ref Range    WBC 8.4 4.4 - 11.3 x10*3/uL    nRBC 0.0 0.0 - 0.0 /100 WBCs    RBC 4.77 4.00 - 5.20 x10*6/uL    Hemoglobin 14.2 12.0 - 16.0 g/dL    Hematocrit 46.4 (H) 36.0 - 46.0 %    MCV 97 80 - 100 fL    MCH 29.8 26.0 - 34.0 pg    MCHC 30.6 (L) 32.0 - 36.0 g/dL    RDW 13.9 11.5 - 14.5 %    Platelets 163 150 - 450 x10*3/uL   Magnesium   Result Value Ref Range    Magnesium 2.17 1.60 - 2.40 mg/dL   Hepatic Function Panel   Result Value Ref Range    Albumin 3.4 3.4 - 5.0 g/dL    Bilirubin, Total 1.1 0.0 - 1.2 mg/dL    Bilirubin, Direct 0.4 (H) 0.0 - 0.3 mg/dL    Alkaline Phosphatase 66 33 - 136 U/L    ALT 8 7 - 45 U/L    AST 15 9 - 39 U/L    Total Protein 6.1 (L) 6.4 - 8.2 g/dL   Phosphorus   Result Value Ref Range    Phosphorus 4.2  2.5 - 4.9 mg/dL   Basic Metabolic Panel   Result Value Ref Range    Glucose 156 (H) 74 - 99 mg/dL    Sodium 137 136 - 145 mmol/L    Potassium 4.4 3.5 - 5.3 mmol/L    Chloride 105 98 - 107 mmol/L    Bicarbonate 23 21 - 32 mmol/L    Anion Gap 13 10 - 20 mmol/L    Urea Nitrogen 17 6 - 23 mg/dL    Creatinine 0.71 0.50 - 1.05 mg/dL    eGFR 78 >60 mL/min/1.73m*2    Calcium 8.9 8.6 - 10.3 mg/dL   Heparin Assay, UFH   Result Value Ref Range    Heparin Unfractionated 0.9 See Comment Below for Therapeutic Ranges IU/mL         Physical Exam:  Subjective:   Examination:   General Examination:   General Appearance: Well developed, well nourished, in no acute distress.   Head: normocephalic, atraumatic   Eyes: Anicteric sclera. Pupils are equally round and reactive to light.  Extraocular movements are intact.    Ears: normal   Oral: Cavity: mucosa moist.   Throat: clear.   Neck/Thyroid: neck supple, full range of motion, no cervical lymphadenopathy.   Skin: warm and dry, no suspicious lesions.    Heart: regular rate and rhythm, S1, S2 normal, no murmurs.   Lungs: clear to auscultation bilaterally.   Abdomen: soft, non-tender, non-distended, bowl sound present, normal.   Extremities: no clubbing, no cyanosis, no edema.   Neuro: non-focal, motor strength normal upper lower extremities, sensory exam intact.       Assessment/Plan     I26.9 Concern for pulmonary embolism with RV strain  I25.1 Coronary disease-remote LAD stents 2011 normal cath 2016 ACS with right coronary PDA stent LAD angioplasty and diagonal angioplasty 2021  I48.91 Atrial fibrillation for years on Coumadin declined Eliquis stopped Coumadin after recurrent bladder hemorrhages and after fall with right femur fracture at age 89  I10.0 Hypertension    Anticoagulation  Echo hopefully with RV strain looking for hemodynamic sequela I from pulmonary emboli  Prefer Eliquis in the setting as the anticoagulant of choice    Code Status:  DNR and No Intubation    I spent 45  minutes in the professional and overall care of this patient.        Kvng Hein MD

## 2024-07-18 NOTE — NURSING NOTE
2010- pox on 3L/NC 87-88%, increased to 4L/NC and complaining of back pain. Dr. Retana at bedside and updated. Oxygen,duondebs,solumedrol,I.S,one time tylenol,Ct scan for PE ordered.  Rechecked pox after breathing treatment-92%    2200-down for ct scan,medicated with tylenol once returned.      0100- started on heparin gtt,echo and cardiology consult ordered. States feeling better after tylenol. She remains on 4L/NC  Daughter was updated per med house.   Heparin assay due at 0530      0600-hep assay 0.9, adjusted per protocol and repeat level at 1000.     Resting comfortably at this time. Call bell in reach,safety maintained.

## 2024-07-18 NOTE — H&P
History Of Present Illness  Noemí Hinton is a 89 y.o. F with PMH of A fib, not on AC, ( due to bleeding from bladder) , CAD, s/p PCI and stent, GERD, HLD, Urinary incontinence, lt femur fx, presented to ER due to hypoxia, shortness of breath and mid back pain.  Denied any injury or trauma, denies any fever or chills, wheeze.  Patient has also lost about 15 to 18 pounds in last few months.     Past Medical History  She has a past medical history of Anxiety (2023), Arthritis, Atrial fibrillation (Multi) (2023), Breast mass, right (), Coronary artery disease due to lipid rich plaque (2023), GERD (gastroesophageal reflux disease) (2023), Hip fracture, left (Multi) (2023), History of femur fracture, HLD (hyperlipidemia), Incontinence of urine, Myocardial infarction (Multi), Neuropathy (2023), and Sciatica.    Surgical History  She has a past surgical history that includes Coronary angioplasty with stent; Hip fracture surgery (Left); ORIF femur fracture (Left); and  section, classic.     Social History  She reports that she has never smoked. She has never used smokeless tobacco. She reports that she does not drink alcohol and does not use drugs.    Family History  Family History   Problem Relation Name Age of Onset    Heart disease Mother          CAD, CHF    Coronary artery disease Mother      Heart failure Mother      Heart disease Father          had MI, CAD    Coronary artery disease Father      Heart attack Father          Allergies  Tramadol, Codeine, and Hydrocodone    Current medications:      Current Facility-Administered Medications:     acetaminophen (Tylenol) tablet 650 mg, 650 mg, oral, q4h PRN, Candice Anne PA-C    atenolol (Tenormin) tablet 25 mg, 25 mg, oral, Nightly, Candice Anne PA-C, 25 mg at 24    azithromycin (Zithromax) in dextrose 5 % in water (D5W) 250 mL  mg, 500 mg, intravenous, q24h, Candice Anne PA-C, Stopped at  "07/17/24 1751    cefTRIAXone (Rocephin) 1 g in dextrose (iso) IV 50 mL, 1 g, intravenous, q24h, Candice Anne PA-C    ipratropium-albuteroL (Duo-Neb) 0.5-2.5 mg/3 mL nebulizer solution 3 mL, 3 mL, nebulization, q6h, Candice Anne PA-C, 3 mL at 07/17/24 2045    lidocaine 4 % patch 1 patch, 1 patch, transdermal, Once, Zuhair Pardo, DO, 1 patch at 07/17/24 1214    methocarbamol (Robaxin) tablet 500 mg, 500 mg, oral, q8h ABBY, Candice Anne PA-C    methylPREDNISolone sod succinate (SOLU-Medrol) 40 mg/mL injection 40 mg, 40 mg, intravenous, q24h, Candice Anne PA-C    oxygen (O2) therapy, , inhalation, Continuous - Inhalation, Candice Anne PA-C       Review of Systems     Physical Exam     Last Recorded Vitals      Blood pressure 121/80, pulse 84, temperature 36.3 °C (97.3 °F), temperature source Temporal, resp. rate 16, height 1.727 m (5' 8\"), weight 56.3 kg (124 lb 0.1 oz), SpO2 95%.    Relevant Results     Results for orders placed or performed during the hospital encounter of 07/17/24 (from the past 24 hour(s))   CBC and Auto Differential   Result Value Ref Range    WBC 13.2 (H) 4.4 - 11.3 x10*3/uL    nRBC 0.0 0.0 - 0.0 /100 WBCs    RBC 5.06 4.00 - 5.20 x10*6/uL    Hemoglobin 15.2 12.0 - 16.0 g/dL    Hematocrit 47.8 (H) 36.0 - 46.0 %    MCV 95 80 - 100 fL    MCH 30.0 26.0 - 34.0 pg    MCHC 31.8 (L) 32.0 - 36.0 g/dL    RDW 13.8 11.5 - 14.5 %    Platelets 181 150 - 450 x10*3/uL    Neutrophils % 87.7 40.0 - 80.0 %    Immature Granulocytes %, Automated 0.4 0.0 - 0.9 %    Lymphocytes % 4.3 13.0 - 44.0 %    Monocytes % 7.0 2.0 - 10.0 %    Eosinophils % 0.2 0.0 - 6.0 %    Basophils % 0.4 0.0 - 2.0 %    Neutrophils Absolute 11.58 (H) 1.60 - 5.50 x10*3/uL    Immature Granulocytes Absolute, Automated 0.05 0.00 - 0.50 x10*3/uL    Lymphocytes Absolute 0.57 (L) 0.80 - 3.00 x10*3/uL    Monocytes Absolute 0.93 (H) 0.05 - 0.80 x10*3/uL    Eosinophils Absolute 0.03 0.00 - 0.40 x10*3/uL    Basophils Absolute 0.05 " 0.00 - 0.10 x10*3/uL   Comprehensive metabolic panel   Result Value Ref Range    Glucose 98 74 - 99 mg/dL    Sodium 140 136 - 145 mmol/L    Potassium 4.6 3.5 - 5.3 mmol/L    Chloride 105 98 - 107 mmol/L    Bicarbonate 26 21 - 32 mmol/L    Anion Gap 14 10 - 20 mmol/L    Urea Nitrogen 16 6 - 23 mg/dL    Creatinine 0.74 0.50 - 1.05 mg/dL    eGFR 75 >60 mL/min/1.73m*2    Calcium 9.4 8.6 - 10.3 mg/dL    Albumin 3.7 3.4 - 5.0 g/dL    Alkaline Phosphatase 71 33 - 136 U/L    Total Protein 7.0 6.4 - 8.2 g/dL    AST 14 9 - 39 U/L    Bilirubin, Total 1.5 (H) 0.0 - 1.2 mg/dL    ALT 8 7 - 45 U/L   B-Type Natriuretic Peptide   Result Value Ref Range     (H) 0 - 99 pg/mL   Troponin I, High Sensitivity, Initial   Result Value Ref Range    Troponin I, High Sensitivity 12 0 - 13 ng/L   Protime-INR   Result Value Ref Range    Protime 13.2 (H) 9.8 - 12.8 seconds    INR 1.2 (H) 0.9 - 1.1   Troponin, High Sensitivity, 1 Hour   Result Value Ref Range    Troponin I, High Sensitivity 11 0 - 13 ng/L   Sars-CoV-2 PCR   Result Value Ref Range    Coronavirus 2019, PCR Not Detected Not Detected          Radiology  CT Chest without IV Contrast, CT Thoracic Spine without IV Contrast;  7/17/2024 12:57 PM.  INDICATION:  Left rib cage pain and low back pain; Concern for rib fracture.  COMPARISON:  Chest radiograph 8/16/2023.  CTA chest abdomen pelvis 4/20/2021..  ACCESSION NUMBER(S):  [id number], [id number]  ORDERING CLINICIAN:  ANGIE RANDLE  TECHNIQUE:  CT of the chest was performed without contrast.  CT of the  thoracic spine was performed without intravenous or intrathecal  contrast.  Sagittal and coronal reconstructions were generated.  Automated mA/kV exposure control was utilized and patient examination  was performed in strict accordance with principles of ALARA.  FINDINGS:  CHEST:  There is a 5 cm mass in the right breast.  This should be evaluated  with mammography.  There is enlargement of the right lobe of the thyroid.  This is  best  evaluated with ultrasound.  MEDIASTINUM:  The heart is normal in size without pericardial effusion.  Coronary  artery calcifications are identified.    LUNGS/PLEURA:  There is a small left pleural effusion.  There is an 11 mm nodule anteriorly in the right lower lobe abutting  against the fissure.  It is best seen on series 205, slice 2-0 as well  as series 203, slice 56.  There are bibasilar pulmonary infiltrates most pronounced on the left.  LYMPH NODES:  Thoracic lymph nodes are not enlarged.  UPPER ABDOMEN:  There are gallstones in the gallbladder.  There is diverticulosis.  BONES:  There are no acute fractures.  No suspicious bony lesions.  THORACIC SPINE:  There is scoliosis of the thoracic curve with the curve to the right.   There is no fracture or traumatic subluxation.   The vertebral body heights are well maintained.  There is  intervertebral disc space narrowing most pronounced on the left with  osteophyte secondary to the scoliosis.  This does result in multiple  level neural foraminal narrowing.  The paravertebral soft tissues are within normal limits.  The  visualized chest and abdomen are unremarkable.  IMPRESSION:  5 cm mass in the right breast.  Mammography is recommended.  Cholelithiasis.  Enlarged thyroid.  This is best evaluated with ultrasound.  11 mm nodule anteriorly in the right lower lobe.  This may represent  focal consolidation but a mass cannot be excluded and is best  evaluated with PET CT scan.  Bibasilar infiltrates with left pleural effusion.  Scoliosis of the thoracic spine with diffuse degenerative changes.  No  evidence of acute fracture.       CT ANGIO CHEST FOR PULMONARY EMBOLISM;  7/17/2024 10:35 pm      INDICATION:  Signs/Symptoms:elevated d-dimer, acute hypoxia      COMPARISON:  Noncontrast CT chest 07/17/2024. CT angiogram chest, abdomen, pelvis  04/20/2021.      ACCESSION NUMBER(S):  [id number]      ORDERING CLINICIAN:  MEY AMAYA      TECHNIQUE:  Helical data  acquisition of the chest was obtained following the  uneventful administration of intravenous contrast material. Images  were reformatted in axial, coronal, and sagittal planes. MIP images  were created and reviewed.      FINDINGS:  POTENTIAL LIMITATIONS OF THE STUDY: Motion artifact.      HEART AND VESSELS:  The evaluation for pulmonary emboli is degraded by motion artifact.  Filling defects noted at the left upper lobe segmental and  subsegmental pulmonary arteries extending into left main pulmonary  artery. No saddle embolus.      No thoracic aortic aneurysm or acute dissection. Atherosclerotic  calcifications are noted at the thoracic aorta.      The heart is enlarged.  Coronary artery calcifications are present.  No evidence of pericardial effusion.   The RV to LV ratio was  measured at 1.07.      MEDIASTINUM AND JONA, LOWER NECK AND AXILLA:  The visualized thyroid gland is enlarged.      No evidence of thoracic lymphadenopathy by CT criteria.      LUNGS AND AIRWAYS:  The trachea and central airways are patent.      The evaluation of the lungs is degraded by motion artifact. There is  a small left pleural effusion with atelectasis at the left lower  lobe. There is mild atelectasis at the right lower lobe. No  pneumothorax. There is a 1.3 cm nodular density at the anterior  aspect at the base of the right lower lobe (series 405, axial image  217).      UPPER ABDOMEN:  Evaluation degraded by motion artifact. No obvious acute findings.  There is colonic diverticulosis.      CHEST WALL AND OSSEOUS STRUCTURES:  Multiple lobulated densities are noted at the right breast with a 5.4  x 4.7 cm heterogeneous mass, increased since prior CT 04/20/2021.  There is diffuse osteopenia. Degenerative changes at the bilateral  glenohumeral joints. Multilevel degenerative changes at the spine.  There is S shaped scoliosis of the thoracolumbar spine.      IMPRESSION:  1. Study degraded by motion artifact. Acute pulmonary emboli at  the  left upper lobe. RV to LV ratio 1.07.  2. Cardiomegaly. Coronary artery calcifications.  3. Small left pleural effusion with atelectasis at the left lower  lobe. Mild atelectasis at the right lower lobe.  4. 1.3 cm nodular density at the anterior aspect at the base of the  right lower lobe, may be secondary to atelectasis versus pulmonary  nodule. Follow-up CT chest in 3 months versus PET/CT recommended for  further evaluation.  5. Enlarged thyroid gland. Please correlate with laboratory values.  Evaluation with ultrasound as clinically warranted.  6. Multiple lobulated densities at the right breast with a 5.4 x 4.7  cm heterogeneous mass, increased since prior CT 04/20/2021,  concerning for breast malignancy. [facility]  recommended for further evaluation.            Assessment/Plan   Principal Problem:    Pneumonia due to infectious organism, unspecified laterality, unspecified part of lung  Acute PE  Rt breast mass  A fib, not on CA due to bleeding  Acute hypoxic resp failure      PLAN: Patient was admitted on 3 N., was started on IV heparin drip, continue with IV CTX and azithromycin, aerosol and oxygen, pulmonary and cardiology consult, DVT prophylaxis, discussed with CNP, follow closely, I have coordinated the care.           Dino Retana MD

## 2024-07-18 NOTE — CONSULTS
Pulmonary Consult    Request of Pulmonary Consult acute on top of chronic respiratory failure  I have independently interviewed and examined the patient and reviewed available records.    Physician HPI (7/18/2024):  A 89 y.o. female CAD s/p PCI x 4 stents in 2013 and PCI with 2 stents 2021, HTN, HLD, atrial fibrillation on coumadin, OA, GERD, anxiety and sciatica. presenting with possible pulmonary.  embolism,.  She was on Coumadin for years but discontinued in December 2023 due to bladder hemorrhage.    Pulmonary service consulted for hypoxia dyspnea   She complains of chest pain that is worse with inspiration.  Initially her saturations were 88 to 89%  then her saturation was 86% on 2 L and her PCP saw her ordering CT could not be done and came to the ER.    Patient denies recent fever/chills, headache, visual disturbance, dysphagia,  cough, cold symptoms, chest pain, palpitations, lightheadedness/dizziness, abdominal pain, N/V/D, rash, wounds, hematuria, melena, or dysuria.          PMH:    CAD s/p PCI x 4 stents in 2013 and PCI with 2 stents 2021, HTN, HLD, atrial fibrillation on coumadin, OA, GERD, anxiety and sciatica  PSH: PCI with stents, hip surgery  Social history: Denies tobacco, alcohol, or illicit drug use   Family past medical history: Reviewed noncontributory     Immunization History:    There is no immunization history on file for this patient.    Family History:  Family History   Problem Relation Name Age of Onset    Heart disease Mother          CAD, CHF    Coronary artery disease Mother      Heart failure Mother      Heart disease Father          had MI, CAD    Coronary artery disease Father      Heart attack Father         Social History:  Social History     Socioeconomic History    Marital status:     Number of children: 2   Tobacco Use    Smoking status: Never    Smokeless tobacco: Never   Vaping Use    Vaping status: Never Used   Substance and Sexual Activity    Alcohol use: Never     "Drug use: Never   Social History Narrative    Typically lives home with daughter, but has been on respite care for the last 2 months     Social Determinants of Health     Financial Resource Strain: Low Risk  (7/18/2024)    Overall Financial Resource Strain (CARDIA)     Difficulty of Paying Living Expenses: Not very hard   Transportation Needs: No Transportation Needs (7/18/2024)    PRAPARE - Transportation     Lack of Transportation (Medical): No     Lack of Transportation (Non-Medical): No   Housing Stability: Low Risk  (7/18/2024)    Housing Stability Vital Sign     Unable to Pay for Housing in the Last Year: No     Number of Times Moved in the Last Year: 1     Homeless in the Last Year: No       Current Medications:  Current Outpatient Medications   Medication Instructions    atenolol (TENORMIN) 25 mg, oral, Nightly    gabapentin (NEURONTIN) 300 mg, oral, 2 times daily    lidocaine (Lidoderm) 5 % patch APPLY TO LEFT OR RIGHT SHOULDER 1 PATCH TOPICALLY ONE TIME A DAY AS NEEDED (ON FOR 12 HOURS AND OFF FOR 12 HOURS)    nystatin (Mycostatin) 100,000 unit/gram powder 1 Application, Topical, 2 times daily, Apply under breasts    PharbetoL 325 mg, oral, Daily        Drug Allergies/Intolerances:  Allergies   Allergen Reactions    Tramadol GI bleeding    Codeine Hives    Hydrocodone Hives        Review of Systems:  Review of Systems     All other review of systems are negative and/or non-contributory.    Physical Examination:  /63 (Patient Position: Lying)   Pulse 87   Temp 36.1 °C (97 °F) (Temporal)   Resp 20   Ht 1.727 m (5' 8\")   Wt 55.8 kg (123 lb 0.3 oz)   SpO2 95%   BMI 18.70 kg/m²      General: ambulated independently; no acute distress; well-nourished; work of breathing was not increased; normal vocal character  HEENT: normocephalic; anicteric sclerae; conjunctivae not injected; nasal mucosa was unremarkable; oropharynx was clear without evidence of thrush; dentition was good.  Neck: supple; no " lymphadenopathy or thyromegaly.  Chest: clear to auscultation bilaterally; no chest wall deformity.  Cardiac: regular rhythm; no gallop or murmur.  Abdomen: soft; non-tender; non-distended; no hepatosplenomegaly.  Extremities: no leg edema; no digital clubbing; 2+ pulses  Psychiatric: did not appear depressed or anxious.    Pulmonary Function Test Results     Failed to redirect to the Timeline version of the [employer] SmartLink.      Chest Radiograph     XR chest 1 view 08/16/2023    Narrative  Interpreted By:  KATIE MORENO MD  STUDY:  Chest Radiograph;  8/16/2023 11:43 PM    INDICATION:  Fall.    COMPARISON:  4/20/2021 CT Chest    ACCESSION NUMBER(S):  [id number]    ORDERING CLINICIAN:  CAMERON BENSON DO    TECHNIQUE:  Frontal chest was obtained at 23:43 hours.    FINDINGS:    CARDIOMEDIASTINAL SILHOUETTE:  Cardiomediastinal silhouette is upper normal in size.  Prominent  atherosclerotic calcifications.    LUNGS:  Lungs are clear.    ABDOMEN:  No remarkable upper abdominal findings.    BONES:  No acute osseous changes.  Degenerative changes of the shoulders, left  greater than right.    Impression  Upper normal heart size.  Prominent atherosclerotic calcifications.  Clear lungs.  No pneumothorax or pleural effusion.      Signed by Katie Moreno MD      Echocardiogram     No results found for this or any previous visit from the past 365 days.       Chest CT Scan     CT angio chest for pulmonary embolism 07/17/2024    Narrative  Interpreted By:  Coty Hoyt,  STUDY:  CT ANGIO CHEST FOR PULMONARY EMBOLISM;  7/17/2024 10:35 pm    INDICATION:  Signs/Symptoms:elevated d-dimer, acute hypoxia    COMPARISON:  Noncontrast CT chest 07/17/2024. CT angiogram chest, abdomen, pelvis  04/20/2021.    ACCESSION NUMBER(S):  [id number]    ORDERING CLINICIAN:  MEY AMAYA    TECHNIQUE:  Helical data acquisition of the chest was obtained following the  uneventful administration of intravenous contrast material.  Images  were reformatted in axial, coronal, and sagittal planes. MIP images  were created and reviewed.    FINDINGS:  POTENTIAL LIMITATIONS OF THE STUDY: Motion artifact.    HEART AND VESSELS:  The evaluation for pulmonary emboli is degraded by motion artifact.  Filling defects noted at the left upper lobe segmental and  subsegmental pulmonary arteries extending into left main pulmonary  artery. No saddle embolus.    No thoracic aortic aneurysm or acute dissection. Atherosclerotic  calcifications are noted at the thoracic aorta.    The heart is enlarged.  Coronary artery calcifications are present.  No evidence of pericardial effusion.   The RV to LV ratio was  measured at 1.07.    MEDIASTINUM AND JONA, LOWER NECK AND AXILLA:  The visualized thyroid gland is enlarged.    No evidence of thoracic lymphadenopathy by CT criteria.    LUNGS AND AIRWAYS:  The trachea and central airways are patent.    The evaluation of the lungs is degraded by motion artifact. There is  a small left pleural effusion with atelectasis at the left lower  lobe. There is mild atelectasis at the right lower lobe. No  pneumothorax. There is a 1.3 cm nodular density at the anterior  aspect at the base of the right lower lobe (series 405, axial image  217).    UPPER ABDOMEN:  Evaluation degraded by motion artifact. No obvious acute findings.  There is colonic diverticulosis.    CHEST WALL AND OSSEOUS STRUCTURES:  Multiple lobulated densities are noted at the right breast with a 5.4  x 4.7 cm heterogeneous mass, increased since prior CT 04/20/2021.  There is diffuse osteopenia. Degenerative changes at the bilateral  glenohumeral joints. Multilevel degenerative changes at the spine.  There is S shaped scoliosis of the thoracolumbar spine.    Impression  1. Study degraded by motion artifact. Acute pulmonary emboli at the  left upper lobe. RV to LV ratio 1.07.  2. Cardiomegaly. Coronary artery calcifications.  3. Small left pleural effusion with  atelectasis at the left lower  lobe. Mild atelectasis at the right lower lobe.  4. 1.3 cm nodular density at the anterior aspect at the base of the  right lower lobe, may be secondary to atelectasis versus pulmonary  nodule. Follow-up CT chest in 3 months versus PET/CT recommended for  further evaluation.  5. Enlarged thyroid gland. Please correlate with laboratory values.  Evaluation with ultrasound as clinically warranted.  6. Multiple lobulated densities at the right breast with a 5.4 x 4.7  cm heterogeneous mass, increased since prior CT 04/20/2021,  concerning for breast malignancy. [facility]  recommended for further evaluation.      MACRO:  Coty Hoyt discussed the significance and urgency of this  critical finding by telephone with  Kellee Pineda on 7/17/2024 at  11:59 pm.  (**-RCF-**) Findings:  See findings.      Critical Finding:  There are multiple critical findings. See  findings. notification was initiated on 7/17/2024 at 11:37 pm by  Coty Hoyt.  (**-YCF-**)              Signed by: Coty Hoyt 7/18/2024 12:04 AM  Dictation workstation:   [id number]       Co-morbidities Problem List    Assessment and Plan / Recommendations:  Problem List Items Addressed This Visit       * (Principal) Pneumonia due to infectious organism, unspecified laterality, unspecified part of lung - Primary     Other Visit Diagnoses       Mass of breast, unspecified laterality        Relevant Orders    Referral to [facility]    Acute pulmonary embolism, unspecified pulmonary embolism type, unspecified whether acute cor pulmonale present (Multi)        Relevant Orders    Transthoracic Echo (TTE) Complete    Lower extremity venous duplex bilateral (Completed)    Elevated d-dimer        PE (pulmonary thromboembolism) (Multi)        Pain and swelling of left lower extremity        Relevant Orders    Lower extremity venous duplex bilateral (Completed)                   Pulmonary  embolism with RV strain  Continue on anticoagulation  Recommend   echocardiography to assess right ventricular strain  Doppler of LE    CAD:  remote LAD stents 2011 normal cath 2016 ACS with right coronary PDA stent LAD angioplasty and diagonal angioplasty 2021    Atrial fibrillation   for years on Coumadin declined Eliquis stopped Coumadin after recurrent bladder hemorrhages and after fall with right femur fracture at age 89    Hypertension    Pulmonary nodule: high suspicious of metastatic lesion, primary can be breast mass primary lesion to be biopsied  Breast mass:   Multiple lobulated densities at the right breast with a 5.4 x 4.7  cm heterogeneous mass, increased since prior CT 04/20/2021,  concerning for breast malignancy  biopsy Is recommended  Consult oncology      I have independently interviewed and examined the patient and reviewed available records.  I personally reviewed CT images and lab report  Professional time of this encounter 60 min      Merritt Laurent MD

## 2024-07-19 LAB
ADENOVIRUS RVP, VIRC: NOT DETECTED
ALBUMIN SERPL BCP-MCNC: 2.9 G/DL (ref 3.4–5)
ALP SERPL-CCNC: 52 U/L (ref 33–136)
ALT SERPL W P-5'-P-CCNC: 6 U/L (ref 7–45)
ANION GAP SERPL CALC-SCNC: 9 MMOL/L (ref 10–20)
AORTIC VALVE MEAN GRADIENT: 10 MMHG
AORTIC VALVE PEAK VELOCITY: 2.35 M/S
AST SERPL W P-5'-P-CCNC: 10 U/L (ref 9–39)
AV PEAK GRADIENT: 22.1 MMHG
AVA (PEAK VEL): 2.51 CM2
AVA (VTI): 2.9 CM2
BILIRUB DIRECT SERPL-MCNC: 0.1 MG/DL (ref 0–0.3)
BILIRUB SERPL-MCNC: 0.5 MG/DL (ref 0–1.2)
BUN SERPL-MCNC: 27 MG/DL (ref 6–23)
CALCIUM SERPL-MCNC: 8.7 MG/DL (ref 8.6–10.3)
CHLORIDE SERPL-SCNC: 107 MMOL/L (ref 98–107)
CO2 SERPL-SCNC: 26 MMOL/L (ref 21–32)
CREAT SERPL-MCNC: 0.79 MG/DL (ref 0.5–1.05)
EGFRCR SERPLBLD CKD-EPI 2021: 69 ML/MIN/1.73M*2
EJECTION FRACTION APICAL 4 CHAMBER: 70.3
EJECTION FRACTION: 65 %
ENTEROVIRUS/RHINOVIRUS RVP, VIRC: NOT DETECTED
ERYTHROCYTE [DISTWIDTH] IN BLOOD BY AUTOMATED COUNT: 13.9 % (ref 11.5–14.5)
GLUCOSE SERPL-MCNC: 102 MG/DL (ref 74–99)
HCT VFR BLD AUTO: 38.4 % (ref 36–46)
HGB BLD-MCNC: 12.1 G/DL (ref 12–16)
HOLD SPECIMEN: NORMAL
HUMAN BOCAVIRUS RVP, VIRC: NOT DETECTED
HUMAN CORONAVIRUS RVP, VIRC: NOT DETECTED
INFLUENZA A , VIRC: NOT DETECTED
INFLUENZA A H1N1-09 , VIRC: NOT DETECTED
INFLUENZA B PCR, VIRC: NOT DETECTED
LEFT VENTRICLE INTERNAL DIMENSION DIASTOLE: 3.3 CM (ref 3.5–6)
LEFT VENTRICULAR OUTFLOW TRACT DIAMETER: 2 CM
LV EJECTION FRACTION BIPLANE: 65 %
MAGNESIUM SERPL-MCNC: 2.1 MG/DL (ref 1.6–2.4)
MCH RBC QN AUTO: 30 PG (ref 26–34)
MCHC RBC AUTO-ENTMCNC: 31.5 G/DL (ref 32–36)
MCV RBC AUTO: 95 FL (ref 80–100)
METAPNEUMOVIRUS , VIRC: NOT DETECTED
MITRAL VALVE E/A RATIO: 0.5
NRBC BLD-RTO: 0 /100 WBCS (ref 0–0)
PARAINFLUENZA PCR, VIRC: NOT DETECTED
PHOSPHATE SERPL-MCNC: 3.4 MG/DL (ref 2.5–4.9)
PLATELET # BLD AUTO: 155 X10*3/UL (ref 150–450)
POTASSIUM SERPL-SCNC: 4 MMOL/L (ref 3.5–5.3)
PROT SERPL-MCNC: 5.6 G/DL (ref 6.4–8.2)
RBC # BLD AUTO: 4.03 X10*6/UL (ref 4–5.2)
RIGHT VENTRICLE FREE WALL PEAK S': 9.79 CM/S
RIGHT VENTRICLE PEAK SYSTOLIC PRESSURE: 66.7 MMHG
RSV PCR, RVP, VIRC: NOT DETECTED
SODIUM SERPL-SCNC: 138 MMOL/L (ref 136–145)
TRICUSPID ANNULAR PLANE SYSTOLIC EXCURSION: 2.1 CM
UFH PPP CHRO-ACNC: 0.4 IU/ML
WBC # BLD AUTO: 9.4 X10*3/UL (ref 4.4–11.3)

## 2024-07-19 PROCEDURE — 2500000002 HC RX 250 W HCPCS SELF ADMINISTERED DRUGS (ALT 637 FOR MEDICARE OP, ALT 636 FOR OP/ED): Performed by: PHYSICIAN ASSISTANT

## 2024-07-19 PROCEDURE — 2500000005 HC RX 250 GENERAL PHARMACY W/O HCPCS: Performed by: PHYSICIAN ASSISTANT

## 2024-07-19 PROCEDURE — 84100 ASSAY OF PHOSPHORUS: CPT | Performed by: PHYSICIAN ASSISTANT

## 2024-07-19 PROCEDURE — 83735 ASSAY OF MAGNESIUM: CPT | Performed by: PHYSICIAN ASSISTANT

## 2024-07-19 PROCEDURE — 1200000002 HC GENERAL ROOM WITH TELEMETRY DAILY

## 2024-07-19 PROCEDURE — 2500000001 HC RX 250 WO HCPCS SELF ADMINISTERED DRUGS (ALT 637 FOR MEDICARE OP): Performed by: PHYSICIAN ASSISTANT

## 2024-07-19 PROCEDURE — 36415 COLL VENOUS BLD VENIPUNCTURE: CPT | Performed by: PHYSICIAN ASSISTANT

## 2024-07-19 PROCEDURE — 80048 BASIC METABOLIC PNL TOTAL CA: CPT | Performed by: PHYSICIAN ASSISTANT

## 2024-07-19 PROCEDURE — 84075 ASSAY ALKALINE PHOSPHATASE: CPT | Performed by: PHYSICIAN ASSISTANT

## 2024-07-19 PROCEDURE — 85520 HEPARIN ASSAY: CPT | Performed by: NURSE PRACTITIONER

## 2024-07-19 PROCEDURE — 2500000004 HC RX 250 GENERAL PHARMACY W/ HCPCS (ALT 636 FOR OP/ED): Performed by: PHYSICIAN ASSISTANT

## 2024-07-19 PROCEDURE — 85027 COMPLETE CBC AUTOMATED: CPT | Performed by: PHYSICIAN ASSISTANT

## 2024-07-19 PROCEDURE — 94640 AIRWAY INHALATION TREATMENT: CPT

## 2024-07-19 PROCEDURE — C9113 INJ PANTOPRAZOLE SODIUM, VIA: HCPCS | Performed by: PHYSICIAN ASSISTANT

## 2024-07-19 ASSESSMENT — COGNITIVE AND FUNCTIONAL STATUS - GENERAL
MOBILITY SCORE: 13
TOILETING: A LOT
CLIMB 3 TO 5 STEPS WITH RAILING: TOTAL
DRESSING REGULAR UPPER BODY CLOTHING: A LITTLE
MOVING FROM LYING ON BACK TO SITTING ON SIDE OF FLAT BED WITH BEDRAILS: A LITTLE
TURNING FROM BACK TO SIDE WHILE IN FLAT BAD: A LITTLE
STANDING UP FROM CHAIR USING ARMS: A LOT
DAILY ACTIVITIY SCORE: 16
WALKING IN HOSPITAL ROOM: A LOT
PERSONAL GROOMING: A LITTLE
DRESSING REGULAR LOWER BODY CLOTHING: A LOT
HELP NEEDED FOR BATHING: A LOT
MOVING TO AND FROM BED TO CHAIR: A LOT

## 2024-07-19 ASSESSMENT — PAIN SCALES - GENERAL
PAINLEVEL_OUTOF10: 0 - NO PAIN
PAINLEVEL_OUTOF10: 0 - NO PAIN

## 2024-07-19 ASSESSMENT — PAIN - FUNCTIONAL ASSESSMENT
PAIN_FUNCTIONAL_ASSESSMENT: 0-10
PAIN_FUNCTIONAL_ASSESSMENT: 0-10

## 2024-07-19 NOTE — PROGRESS NOTES
Noemí Hinton is a 89 y.o. female on day 1 of admission presenting with Pneumonia due to infectious organism, unspecified laterality, unspecified part of lung.  Patient CT scan showed acute PE. Duplex US showed b/l DVT.    Subjective   No cp/SOB       Objective         Current Facility-Administered Medications:     acetaminophen (Tylenol) tablet 650 mg, 650 mg, oral, q4h PRN, LAURITA Dunlap-C, 650 mg at 07/17/24 2329    atenolol (Tenormin) tablet 25 mg, 25 mg, oral, Nightly, LAURITA Dunlap-JONATHON, 25 mg at 07/17/24 2014    azithromycin (Zithromax) tablet 500 mg, 500 mg, oral, q24h, Candice Anne PA-C, 500 mg at 07/18/24 1657    bisacodyl (Dulcolax) EC tablet 10 mg, 10 mg, oral, Daily PRN, Candice Anne PA-C    cefTRIAXone (Rocephin) 1 g in dextrose (iso) IV 50 mL, 1 g, intravenous, q24h, Candice Anne PA-C, Stopped at 07/18/24 1727    docusate sodium (Colace) capsule 100 mg, 100 mg, oral, BID, Candice Anne PA-C, 100 mg at 07/18/24 1029    heparin (porcine) injection 2,000-4,000 Units, 2,000-4,000 Units, intravenous, q4h PRN, Candice Anne PA-C    heparin 25,000 Units in dextrose 5% 250 mL (100 Units/mL) infusion (premix), 0-4,500 Units/hr, intravenous, Continuous, Candice Anne PA-C, Last Rate: 8 mL/hr at 07/18/24 1633, 800 Units/hr at 07/18/24 1633    ipratropium-albuteroL (Duo-Neb) 0.5-2.5 mg/3 mL nebulizer solution 3 mL, 3 mL, nebulization, q6h, Candice Anne PA-C, 3 mL at 07/18/24 2050    melatonin tablet 3 mg, 3 mg, oral, Nightly PRN, Candice Anne PA-C    methocarbamol (Robaxin) tablet 500 mg, 500 mg, oral, q8h ABBY, Candice Anne PA-C, 500 mg at 07/18/24 1436    ondansetron ODT (Zofran-ODT) disintegrating tablet 4 mg, 4 mg, oral, q8h PRN **OR** ondansetron (Zofran) injection 4 mg, 4 mg, intravenous, q8h PRN, Candice Anne PA-C    oxygen (O2) therapy, , inhalation, Continuous - Inhalation, Candice Anne PA-C, 4 L/min at 07/18/24 0800    pantoprazole (ProtoNix)  "EC tablet 40 mg, 40 mg, oral, Daily before breakfast, 40 mg at 07/18/24 0600 **OR** pantoprazole (ProtoNix) injection 40 mg, 40 mg, intravenous, Daily before breakfast, Candice Anne PA-C       Physical Exam  HENT:      Head: Normocephalic.   Eyes:      Conjunctiva/sclera: Conjunctivae normal.   Cardiovascular:      Rate and Rhythm: Regular rhythm.   Pulmonary:      Breath sounds: Normal breath sounds.   Abdominal:      General: Bowel sounds are normal.      Palpations: Abdomen is soft.   Musculoskeletal:         General: Normal range of motion.   Skin:     General: Skin is warm and dry.   Neurological:      General: No focal deficit present.      Mental Status: She is alert.   Psychiatric:         Behavior: Behavior normal.           Last Recorded Vitals  Blood pressure 119/58, pulse 87, temperature 36.6 °C (97.9 °F), temperature source Temporal, resp. rate 19, height 1.727 m (5' 8\"), weight 55.8 kg (123 lb 0.3 oz), SpO2 94%.  Intake/Output last 3 Shifts:  I/O last 3 completed shifts:  In: 407.3 (7.3 mL/kg) [P.O.:360; I.V.:47.3 (0.8 mL/kg)]  Out: 400 (7.2 mL/kg) [Urine:400 (0.2 mL/kg/hr)]  Weight: 55.8 kg     Labs:       Results for orders placed or performed during the hospital encounter of 07/17/24 (from the past 24 hour(s))   Blood Culture    Specimen: Peripheral Venipuncture; Blood culture   Result Value Ref Range    Blood Culture Loaded on Instrument - Culture in progress    Blood Culture    Specimen: Peripheral Venipuncture; Blood culture   Result Value Ref Range    Blood Culture Loaded on Instrument - Culture in progress    Procalcitonin   Result Value Ref Range    Procalcitonin 0.12 (H) <=0.07 ng/mL   Legionella Antigen, Urine    Specimen: Urine   Result Value Ref Range    L. pneumophila Urine Ag Negative Negative   Streptococcus pneumoniae Antigen, Urine    Specimen: Urine   Result Value Ref Range    Streptococcus pneumoniae Ag, Urine Negative Negative   Urinalysis with Reflex Culture and Microscopic "   Result Value Ref Range    Color, Urine Yellow Light-Yellow, Yellow, Dark-Yellow    Appearance, Urine Clear Clear    Specific Gravity, Urine >1.050 (N) 1.005 - 1.035    pH, Urine 6.0 5.0, 5.5, 6.0, 6.5, 7.0, 7.5, 8.0    Protein, Urine 10 (TRACE) NEGATIVE, 10 (TRACE), 20 (TRACE) mg/dL    Glucose, Urine Normal Normal mg/dL    Blood, Urine 0.03 (TRACE) (A) NEGATIVE    Ketones, Urine NEGATIVE NEGATIVE mg/dL    Bilirubin, Urine NEGATIVE NEGATIVE    Urobilinogen, Urine Normal Normal mg/dL    Nitrite, Urine NEGATIVE NEGATIVE    Leukocyte Esterase, Urine 500 Leonor/µL (A) NEGATIVE   Microscopic Only, Urine   Result Value Ref Range    WBC, Urine 21-50 (A) 1-5, NONE /HPF    RBC, Urine 11-20 (A) NONE, 1-2, 3-5 /HPF    Squamous Epithelial Cells, Urine 1-9 (SPARSE) Reference range not established. /HPF    Bacteria, Urine 1+ (A) NONE SEEN /HPF   CBC   Result Value Ref Range    WBC 8.4 4.4 - 11.3 x10*3/uL    nRBC 0.0 0.0 - 0.0 /100 WBCs    RBC 4.77 4.00 - 5.20 x10*6/uL    Hemoglobin 14.2 12.0 - 16.0 g/dL    Hematocrit 46.4 (H) 36.0 - 46.0 %    MCV 97 80 - 100 fL    MCH 29.8 26.0 - 34.0 pg    MCHC 30.6 (L) 32.0 - 36.0 g/dL    RDW 13.9 11.5 - 14.5 %    Platelets 163 150 - 450 x10*3/uL   Magnesium   Result Value Ref Range    Magnesium 2.17 1.60 - 2.40 mg/dL   Hepatic Function Panel   Result Value Ref Range    Albumin 3.4 3.4 - 5.0 g/dL    Bilirubin, Total 1.1 0.0 - 1.2 mg/dL    Bilirubin, Direct 0.4 (H) 0.0 - 0.3 mg/dL    Alkaline Phosphatase 66 33 - 136 U/L    ALT 8 7 - 45 U/L    AST 15 9 - 39 U/L    Total Protein 6.1 (L) 6.4 - 8.2 g/dL   Phosphorus   Result Value Ref Range    Phosphorus 4.2 2.5 - 4.9 mg/dL   Basic Metabolic Panel   Result Value Ref Range    Glucose 156 (H) 74 - 99 mg/dL    Sodium 137 136 - 145 mmol/L    Potassium 4.4 3.5 - 5.3 mmol/L    Chloride 105 98 - 107 mmol/L    Bicarbonate 23 21 - 32 mmol/L    Anion Gap 13 10 - 20 mmol/L    Urea Nitrogen 17 6 - 23 mg/dL    Creatinine 0.71 0.50 - 1.05 mg/dL    eGFR 78 >60  mL/min/1.73m*2    Calcium 8.9 8.6 - 10.3 mg/dL   Heparin Assay, [facility]   Result Value Ref Range    Heparin Unfractionated 0.9 See Comment Below for Therapeutic Ranges IU/mL   Heparin Assay, [facility]   Result Value Ref Range    Heparin Unfractionated 0.9 See Comment Below for Therapeutic Ranges IU/mL   Transthoracic Echo (TTE) Complete   Result Value Ref Range    BSA 1.64 m2   Heparin Assay, [facility]   Result Value Ref Range    Heparin Unfractionated 0.5 See Comment Below for Therapeutic Ranges IU/mL   Heparin Assay, [facility]   Result Value Ref Range    Heparin Unfractionated 0.4 See Comment Below for Therapeutic Ranges IU/mL              Assessment/Plan   Principal Problem:    Pneumonia due to infectious organism, unspecified laterality, unspecified part of lung  Acute PE  B/l DVT  A-fib  Breast mass      PLAN: Patient was started on IV heparin drip, duplex ultrasound showed bilateral DVT, heme-onc consult was obtained, continue with IV CTX and azithromycin, aerosol and oxygen.          Dino Retana MD

## 2024-07-19 NOTE — CONSULTS
Inpatient consult to Infectious Diseases  Consult performed by: Zulay Sweeney MD  Consult ordered by: Candice Anne PA-C        Reason For Consult  UTI/ pulm infiltrate     History Of Present Illness  Noemí Hinton is a 89 y.o. female w/ pmhx of a.fib, CAD w/ stents, right breast mass (no treatment), and anxiety presented to [facility] ED on  for shortness of breath and admitted for PE being treated with anticoagulation. Patient presented hypoxic on nasal cannula. CT scan of chest showed an 11mm nodule in the anterior right lower lobe as well and bibasilar infiltrates with left pleural effusion. Labs show no leukocytosis and a UA showing 500 leuk esterase, 12-50 WBCs, and no nitrites. Cultures have been negative to date. Patient has been on rocephin for treatment of PNA.     Past Medical History  She has a past medical history of Anxiety (2023), Arthritis, Atrial fibrillation (Multi) (2023), Breast mass, right (), Coronary artery disease due to lipid rich plaque (2023), GERD (gastroesophageal reflux disease) (2023), Hip fracture, left (Multi) (2023), History of femur fracture, HLD (hyperlipidemia), Incontinence of urine, Myocardial infarction (Multi), Neuropathy (2023), and Sciatica.    Surgical History  She has a past surgical history that includes Coronary angioplasty with stent; Hip fracture surgery (Left); ORIF femur fracture (Left); and  section, classic.     Social History  She reports that she has never smoked. She has never used smokeless tobacco. She reports that she does not drink alcohol and does not use drugs.    Family History  Family History   Problem Relation Name Age of Onset    Heart disease Mother          CAD, CHF    Coronary artery disease Mother      Heart failure Mother      Heart disease Father          had MI, CAD    Coronary artery disease Father      Heart attack Father          Allergies  Tramadol, Codeine, and Hydrocodone    Review of  Systems  12 point review of system done and negative except for stated in HPI     Physical Exam  Constitutional: elderly, NAD, resting comfortable   Head and Face: Atraumatic, normocephalic   Eyes: Normal external exam, EOMI  ENT: Normal external inspection of ears and nose. Oropharynx normal.  Cardiovascular: RRR, S1/S2, no murmurs, rubs, or gallops, radial pulses +2  Pulmonary: CTAB, no respiratory distress, no wheezing, rales or rhonchi, on 4L NC  Abdomen: +BS, soft, non-tender, nondistended  MSK: Negative for edema, No joint swelling, normal movements of all extremities.   Neuro: No focal deficits, normal motor function, normal sensation, follows all commands  Skin- No lesions, contusions, or erythema.       Last Recorded Vitals  /63 (BP Location: Right arm, Patient Position: Lying)   Pulse 65   Temp 36.2 °C (97.2 °F) (Temporal)   Resp 18   Wt 54.4 kg (119 lb 14.9 oz)   SpO2 95%     Relevant Results  Lower extremity venous duplex bilateral    Result Date: 7/18/2024            [facility] [place] [address] [place] [address]     Tel [phone] Fax [phone]  Vascular Lab Report  [facility] LOWER EXTREMITY VENOUS DUPLEX BILATERAL Patient Name:      SOCORRO HOWARD        Reading Physician:  [id number] Quentin Quiñones MD, RPVI Study Date:        7/18/2024            Ordering Provider:  [id number] MEY AMAYA MRN/PID:           [id number]             Fellow: Accession#:        [id number]         Technologist:       Char Baum                                                             RVSOLEDAD Date of Birth/Age: 7/27/1928 / 89 years Technologist 2: Gender:            F                    Encounter#:         [id number] Admission Status:  Inpatient            Location Performed: [facility]  Diagnosis/ICD: Other pulmonary embolism without acute cor pulmonale-I26.99  Indication:    Limb swelling CPT Codes:     10843 Peripheral venous duplex scan for DVT complete  Pertinent History: PE. A fib, not on anticoagulation, CAD, HTN.  **CRITICAL RESULT** Critical Result: Free floating DVT left CFV, acute DVT left femoral vein. Age indeterminate DVT left profunda vein, popliteal vein, and peroneal vein. Age indeterminate DVT right popliteal vein Notification called to Candice Anne PA-C on 7/18/2024 at 3:29:02 PM by Char Baum RVT.  CONCLUSIONS: Right Lower Venous: There is age indeterminate deep vein thrombosis visualized in the popliteal vein. The remainder of the right leg is negative for deep vein thrombosis. There is age indeterminate thrombosis visualized in the small saphenous vein. Left Lower Venous: There is acute non-occlusive deep vein thrombosis visualized in the proximal femoral vein. There is age indeterminate deep vein thrombosis visualized in the profunda, popliteal and peroneal veins. There is free floating thrombosis visualized in the common femoral vein. The remainder of the left leg is negative for deep vein thrombosis. Additional Findings: Technically difficult exam due to patient positioning.  Imaging & Doppler Findings:  Right    Compress     Thrombus SSV Prox    No    Age Indeterminate SSV Mid     No        Fibrotic  Right                 Compressible Thrombus        Flow Distal External Iliac     Yes        None   Spontaneous/Phasic CFV                       Yes        None   Spontaneous/Phasic PFV                       Yes        None FV Proximal               Yes        None   Spontaneous/Phasic FV Mid                    Yes        None FV Distal                 Yes        None Popliteal               Partial     ? Age   Spontaneous/Phasic Peroneal                  Yes        None PTV                       Yes        None  Left                  Compress      Thrombus              Flow Distal External Iliac                 None          Spontaneous/Phasic CFV                      No       Free Floating    Spontaneous/Phasic PFV                   Partial         ? Age FV Proximal              No    Acute non-occlusive Spontaneous/Phasic FV Mid                  Yes           None FV Distal               Yes           None Popliteal             Partial         ? Age            Continuous Peroneal                 No           ? Age PTV                     Yes           None  [id number] GLORY Martinez MD Electronically signed by [id number] GLORY Martinez MD on 7/18/2024 at 5:43:19 PM  ** Final **     ECG 12 lead    Result Date: 7/18/2024  Normal sinus rhythm Inferior infarct (cited on or before 12-JAN-2015) Abnormal ECG When compared with ECG of 08-DEC-2023 14:16, No significant change was found    CT angio chest for pulmonary embolism    Result Date: 7/18/2024  Interpreted By:  Coty Hoyt, STUDY: CT ANGIO CHEST FOR PULMONARY EMBOLISM;  7/17/2024 10:35 pm   INDICATION: Signs/Symptoms:elevated d-dimer, acute hypoxia   COMPARISON: Noncontrast CT chest 07/17/2024. CT angiogram chest, abdomen, pelvis 04/20/2021.   ACCESSION NUMBER(S): [id number]   ORDERING CLINICIAN: MEY AMAYA   TECHNIQUE: Helical data acquisition of the chest was obtained following the uneventful administration of intravenous contrast material. Images were reformatted in axial, coronal, and sagittal planes. MIP images were created and reviewed.   FINDINGS: POTENTIAL LIMITATIONS OF THE STUDY: Motion artifact.   HEART AND VESSELS: The evaluation for pulmonary emboli is degraded by motion artifact. Filling defects noted at the left upper lobe segmental and subsegmental pulmonary arteries extending into left main pulmonary artery. No saddle embolus.   No thoracic aortic aneurysm or acute dissection. Atherosclerotic calcifications are noted at the thoracic aorta.   The heart is enlarged.  Coronary artery calcifications are present. No evidence of pericardial effusion.   The RV  to LV ratio was measured at 1.07.   MEDIASTINUM AND JONA, LOWER NECK AND AXILLA: The visualized thyroid gland is enlarged.   No evidence of thoracic lymphadenopathy by CT criteria.   LUNGS AND AIRWAYS: The trachea and central airways are patent.   The evaluation of the lungs is degraded by motion artifact. There is a small left pleural effusion with atelectasis at the left lower lobe. There is mild atelectasis at the right lower lobe. No pneumothorax. There is a 1.3 cm nodular density at the anterior aspect at the base of the right lower lobe (series 405, axial image 217).   UPPER ABDOMEN: Evaluation degraded by motion artifact. No obvious acute findings. There is colonic diverticulosis.   CHEST WALL AND OSSEOUS STRUCTURES: Multiple lobulated densities are noted at the right breast with a 5.4 x 4.7 cm heterogeneous mass, increased since prior CT 04/20/2021. There is diffuse osteopenia. Degenerative changes at the bilateral glenohumeral joints. Multilevel degenerative changes at the spine. There is S shaped scoliosis of the thoracolumbar spine.       1. Study degraded by motion artifact. Acute pulmonary emboli at the left upper lobe. RV to LV ratio 1.07. 2. Cardiomegaly. Coronary artery calcifications. 3. Small left pleural effusion with atelectasis at the left lower lobe. Mild atelectasis at the right lower lobe. 4. 1.3 cm nodular density at the anterior aspect at the base of the right lower lobe, may be secondary to atelectasis versus pulmonary nodule. Follow-up CT chest in 3 months versus PET/CT recommended for further evaluation. 5. Enlarged thyroid gland. Please correlate with laboratory values. Evaluation with ultrasound as clinically warranted. 6. Multiple lobulated densities at the right breast with a 5.4 x 4.7 cm heterogeneous mass, increased since prior CT 04/20/2021, concerning for breast malignancy. [facility] for further evaluation.     MACRO: Coty Hoyt discussed the  significance and urgency of this critical finding by telephone with  Kellee Pineda on 7/17/2024 at 11:59 pm.  (**-RCF-**) Findings:  See findings.     Critical Finding:  There are multiple critical findings. See findings. notification was initiated on 7/17/2024 at 11:37 pm by Coty Hoyt.  (**-YCF-**)             Signed by: Coty Hoyt 7/18/2024 12:04 AM Dictation workstation:   [id number]    CT chest wo IV contrast    Result Date: 7/17/2024  STUDY: CT Chest without IV Contrast, CT Thoracic Spine without IV Contrast; 7/17/2024 12:57 PM. INDICATION: Left rib cage pain and low back pain; Concern for rib fracture. COMPARISON: Chest radiograph 8/16/2023.  CTA chest abdomen pelvis 4/20/2021.. ACCESSION NUMBER(S): [id number], [id number] ORDERING CLINICIAN: ANGIE RANDLE TECHNIQUE:  CT of the chest was performed without contrast.  CT of the thoracic spine was performed without intravenous or intrathecal contrast.  Sagittal and coronal reconstructions were generated. Automated mA/kV exposure control was utilized and patient examination was performed in strict accordance with principles of ALARA. FINDINGS: CHEST: There is a 5 cm mass in the right breast.  This should be evaluated with mammography. There is enlargement of the right lobe of the thyroid.  This is best evaluated with ultrasound. MEDIASTINUM: The heart is normal in size without pericardial effusion.  Coronary artery calcifications are identified.  LUNGS/PLEURA: There is a small left pleural effusion. There is an 11 mm nodule anteriorly in the right lower lobe abutting against the fissure.  It is best seen on series 205, slice 2-0 as well as series 203, slice 56. There are bibasilar pulmonary infiltrates most pronounced on the left. LYMPH NODES: Thoracic lymph nodes are not enlarged. UPPER ABDOMEN: There are gallstones in the gallbladder.  There is diverticulosis. BONES: There are no acute fractures.  No suspicious bony lesions. THORACIC SPINE: There is  scoliosis of the thoracic curve with the curve to the right. There is no fracture or traumatic subluxation. The vertebral body heights are well maintained.  There is intervertebral disc space narrowing most pronounced on the left with osteophyte secondary to the scoliosis.  This does result in multiple level neural foraminal narrowing. The paravertebral soft tissues are within normal limits.  The visualized chest and abdomen are unremarkable.    5 cm mass in the right breast.  Mammography is recommended. Cholelithiasis. Enlarged thyroid.  This is best evaluated with ultrasound. 11 mm nodule anteriorly in the right lower lobe.  This may represent focal consolidation but a mass cannot be excluded and is best evaluated with PET CT scan. Bibasilar infiltrates with left pleural effusion. Scoliosis of the thoracic spine with diffuse degenerative changes.  No evidence of acute fracture. Signed by Feliberto Hdz MD    CT thoracic spine wo IV contrast    Result Date: 7/17/2024  STUDY: CT Chest without IV Contrast, CT Thoracic Spine without IV Contrast; 7/17/2024 12:57 PM. INDICATION: Left rib cage pain and low back pain; Concern for rib fracture. COMPARISON: Chest radiograph 8/16/2023.  CTA chest abdomen pelvis 4/20/2021.. ACCESSION NUMBER(S): [id number], [id number] ORDERING CLINICIAN: ANGIE RANDLE TECHNIQUE:  CT of the chest was performed without contrast.  CT of the thoracic spine was performed without intravenous or intrathecal contrast.  Sagittal and coronal reconstructions were generated. Automated mA/kV exposure control was utilized and patient examination was performed in strict accordance with principles of ALARA. FINDINGS: CHEST: There is a 5 cm mass in the right breast.  This should be evaluated with mammography. There is enlargement of the right lobe of the thyroid.  This is best evaluated with ultrasound. MEDIASTINUM: The heart is normal in size without pericardial effusion.  Coronary artery calcifications are  identified.  LUNGS/PLEURA: There is a small left pleural effusion. There is an 11 mm nodule anteriorly in the right lower lobe abutting against the fissure.  It is best seen on series 205, slice 2-0 as well as series 203, slice 56. There are bibasilar pulmonary infiltrates most pronounced on the left. LYMPH NODES: Thoracic lymph nodes are not enlarged. UPPER ABDOMEN: There are gallstones in the gallbladder.  There is diverticulosis. BONES: There are no acute fractures.  No suspicious bony lesions. THORACIC SPINE: There is scoliosis of the thoracic curve with the curve to the right. There is no fracture or traumatic subluxation. The vertebral body heights are well maintained.  There is intervertebral disc space narrowing most pronounced on the left with osteophyte secondary to the scoliosis.  This does result in multiple level neural foraminal narrowing. The paravertebral soft tissues are within normal limits.  The visualized chest and abdomen are unremarkable.    5 cm mass in the right breast.  Mammography is recommended. Cholelithiasis. Enlarged thyroid.  This is best evaluated with ultrasound. 11 mm nodule anteriorly in the right lower lobe.  This may represent focal consolidation but a mass cannot be excluded and is best evaluated with PET CT scan. Bibasilar infiltrates with left pleural effusion. Scoliosis of the thoracic spine with diffuse degenerative changes.  No evidence of acute fracture. Signed by Feliberto Hdz MD   Results for orders placed or performed during the hospital encounter of 07/17/24 (from the past 24 hour(s))   Heparin Assay, UFH   Result Value Ref Range    Heparin Unfractionated 0.9 See Comment Below for Therapeutic Ranges IU/mL   Transthoracic Echo (TTE) Complete   Result Value Ref Range    BSA 1.64 m2   Heparin Assay, UFH   Result Value Ref Range    Heparin Unfractionated 0.5 See Comment Below for Therapeutic Ranges IU/mL   Heparin Assay, UFH   Result Value Ref Range    Heparin Unfractionated  0.4 See Comment Below for Therapeutic Ranges IU/mL   CBC   Result Value Ref Range    WBC 9.4 4.4 - 11.3 x10*3/uL    nRBC 0.0 0.0 - 0.0 /100 WBCs    RBC 4.03 4.00 - 5.20 x10*6/uL    Hemoglobin 12.1 12.0 - 16.0 g/dL    Hematocrit 38.4 36.0 - 46.0 %    MCV 95 80 - 100 fL    MCH 30.0 26.0 - 34.0 pg    MCHC 31.5 (L) 32.0 - 36.0 g/dL    RDW 13.9 11.5 - 14.5 %    Platelets 155 150 - 450 x10*3/uL   Magnesium   Result Value Ref Range    Magnesium 2.10 1.60 - 2.40 mg/dL   Hepatic Function Panel   Result Value Ref Range    Albumin 2.9 (L) 3.4 - 5.0 g/dL    Bilirubin, Total 0.5 0.0 - 1.2 mg/dL    Bilirubin, Direct 0.1 0.0 - 0.3 mg/dL    Alkaline Phosphatase 52 33 - 136 U/L    ALT 6 (L) 7 - 45 U/L    AST 10 9 - 39 U/L    Total Protein 5.6 (L) 6.4 - 8.2 g/dL   Phosphorus   Result Value Ref Range    Phosphorus 3.4 2.5 - 4.9 mg/dL   Basic Metabolic Panel   Result Value Ref Range    Glucose 102 (H) 74 - 99 mg/dL    Sodium 138 136 - 145 mmol/L    Potassium 4.0 3.5 - 5.3 mmol/L    Chloride 107 98 - 107 mmol/L    Bicarbonate 26 21 - 32 mmol/L    Anion Gap 9 (L) 10 - 20 mmol/L    Urea Nitrogen 27 (H) 6 - 23 mg/dL    Creatinine 0.79 0.50 - 1.05 mg/dL    eGFR 69 >60 mL/min/1.73m*2    Calcium 8.7 8.6 - 10.3 mg/dL   Heparin Assay   Result Value Ref Range    Heparin Unfractionated 0.4 See Comment Below for Therapeutic Ranges IU/mL          Assessment/Plan     ASSESSMENT:  Patient is a 89 y.o. female w/ pmhx of a.fib, CAD w/ stents, right breast mass (no treatment), and anxiety presented to [facility] ED on 7/17 for shortness of breath and admitted for PE being treated with anticoagulation. Consideration for PNA or UTI treatment per request of consultation. Has been on Rocephin. Culture data negative thus far, no sputum culture.    -AHRF 2/2 multi-factorial PE + CAP  -Lung nodule  -R breast mass  -Asymptomatic bacteruria    PLAN:  -Rocephin for 2 more days to cover PNA. 5x days total.    Discussed with Dr. Patel Howell Hostoffer, DO

## 2024-07-19 NOTE — PROGRESS NOTES
07/19/24 1624   Discharge Planning   Home or Post Acute Services Post acute facilities (Rehab/SNF/etc)   Type of Post Acute Facility Services Skilled nursing   Expected Discharge Disposition SNF   Does the patient need discharge transport arranged? Yes   RoundTrip coordination needed? Yes   Has discharge transport been arranged? No     Auth was received.

## 2024-07-19 NOTE — CONSULTS
"Reason For Consult  Goals of Care    History Of Present Illness  Noemí Hinton is a 89 y.o. female presenting with hypoxia, SOB, Mid back pain with inspiration.     Past Medical History  She has a past medical history of Anxiety (2023), Arthritis, Atrial fibrillation (Multi) (2023), Breast mass, right (), Coronary artery disease due to lipid rich plaque (2023), GERD (gastroesophageal reflux disease) (2023), Hip fracture, left (Multi) (2023), History of femur fracture, HLD (hyperlipidemia), Incontinence of urine, Myocardial infarction (Multi), Neuropathy (2023), and Sciatica.    Surgical History  She has a past surgical history that includes Coronary angioplasty with stent; Hip fracture surgery (Left); ORIF femur fracture (Left); and  section, classic.     Social History  She reports that she has never smoked. She has never used smokeless tobacco. She reports that she does not drink alcohol and does not use drugs.    Family History  Family History   Problem Relation Name Age of Onset    Heart disease Mother          CAD, CHF    Coronary artery disease Mother      Heart failure Mother      Heart disease Father          had MI, CAD    Coronary artery disease Father      Heart attack Father          Allergies  Tramadol, Codeine, and Hydrocodone    Review of Systems       Physical Exam       Last Recorded Vitals  Blood pressure 136/63, pulse 65, temperature 36.2 °C (97.2 °F), temperature source Temporal, resp. rate 18, height 1.727 m (5' 8\"), weight 54.4 kg (119 lb 14.9 oz), SpO2 95%.    Relevant Results       Assessment/Plan     Palliative care to discuss goals of care with patient and family.  Pt is from home with her daughter, Hanna. [phone]. Hanna is on vacation in [place] until evening of 24  Pt known to have pneumonia, acute PE and DVT shown by duplex US.   Pt has been on home 02 3L for the past 3 weeks. She has [facility] visits by [facility] who has Rx " capabilities. The pt and family are very pleased with her care so no new referrals for palliative care placed at this time. If pt is discharged to facility the pt is requesting [facility] as she has lived there before. The director Moraima has agreed to accept this patient.   I spoke with the pt and her daughter and they are not seeking any treatment at this time. Pt is DNR/DNI  She has no palliative care needs at this time. She will continue with her current [facility] CNP who will refer her to hospice when they are ready. We will sign off so please re consult if we can be of help.  Lainey Lebron RN notified of above            Vikki Abraham RN     Ambulatory

## 2024-07-19 NOTE — PROGRESS NOTES
Subjective Data:  I26.9 Concern for pulmonary embolism with RV strain  I25.1 Coronary disease-remote LAD stents 2011 normal cath 2016 ACS with right coronary PDA stent LAD angioplasty and diagonal angioplasty 2021  I48.91 Atrial fibrillation for years on Coumadin declined Eliquis stopped Coumadin after recurrent bladder hemorrhages and after fall with right femur fracture at age 89  I10.0 Hypertension          pulmonary emboli, bilateral DVTs   CT for PE showed acute pulmonary emboli in left upper lobe with RV:LV ratio 1.07. Vascular US BLE Venous Duplex showed age indeterminate DVT in popliteal and small saphenous veins. Acute nonocclusive DVT proximal femoral and age indeterminate DVT in profunda, poplitea, and peroneal veins. Free floating thrombus in the common femoral vein.       Echo Mild Aortic stenosis, normal LV, Moderately dilated LV and pulmonary HTN  64/10    PHYSICIAN INTERPRETATION:  Left Ventricle: Left ventricular ejection fraction is normal, calculated by Moran's biplane at 65%. Estimated left ventricular mass is normal. There are no regional wall motion abnormalities. The left ventricular cavity size is normal. The left ventricular septal wall thickness is normal. There is normal left ventricular posterior wall thickness. Spectral Doppler shows an impaired relaxation pattern of left ventricular diastolic filling.  LV Wall Scoring:  All segments are normal.     Left Atrium: The left atrium is normal in size.  Right Ventricle: The right ventricle is moderately enlarged. There is mildly reduced right ventricular systolic function.  Right Atrium: The right atrium is mildly dilated.  Aortic Valve: The aortic valve is trileaflet. The aortic valve appears degenerative. There is mild to moderate aortic valve cusp calcification. There is focal moderate aortic valve thickening. There is evidence of mild aortic valve stenosis.  The aortic valve dimensionless index is 0.92. There is no evidence of aortic  valve regurgitation. The peak instantaneous gradient of the aortic valve is 22.1 mmHg. The mean gradient of the aortic valve is 10.0 mmHg. Non coronary cusp calcified somewhat immobile, R and L cusp mobile.  Mitral Valve: The mitral valve is normal in structure. There is trace mitral valve regurgitation.  Tricuspid Valve: The tricuspid valve is structurally normal. No evidence of tricuspid regurgitation.  Pulmonic Valve: The pulmonic valve is structurally normal. There is no indication of pulmonic valve regurgitation.  Pericardium: There is no pericardial effusion noted.  Aorta: The aortic root is normal. There is no dilatation of the aortic arch. There is no dilatation of the ascending aorta. There is no dilatation of the aortic root.  Pulmonary Artery: The main pulmonary artery is normal in size, and position, with normal bifurcation into the left and right pulmonary arteries.  Systemic Veins: The inferior vena cava appears to be of normal size. The hepatic vein appears to be of normal size. There is IVC inspiratory collapse greater than 50%. The hepatic vein shows a normal flow pattern.        CONCLUSIONS:   1. Left ventricular ejection fraction is normal, calculated by Moran's biplane at 65%.   2. Spectral Doppler shows an impaired relaxation pattern of left ventricular diastolic filling.   3. Moderately enlarged right ventricle.   4. There is mildly reduced right ventricular systolic function.   5. Mild aortic valve stenosis.   6. Non coronary cusp calcified somewhat immobile, R and L cusp mobile.        Past Medical History  She has a past medical history of Anxiety (09/14/2023), Arthritis, Atrial fibrillation (Multi) (08/24/2023), Breast mass, right (2019), Coronary artery disease due to lipid rich plaque (08/24/2023), GERD (gastroesophageal reflux disease) (08/24/2023), Hip fracture, left (Multi) (08/24/2023), History of femur fracture, HLD (hyperlipidemia), Incontinence of urine, Myocardial infarction  (Multi), Neuropathy (2023), and Sciatica.     Surgical History  She has a past surgical history that includes Coronary angioplasty with stent; Hip fracture surgery (Left); ORIF femur fracture (Left); and  section, classic.     Social History  She reports that she has never smoked. She has never used smokeless tobacco. She reports that she does not drink alcohol and does not use drugs.     Family History  Family History[]Expand by Default          Family History   Problem Relation Name Age of Onset    Heart disease Mother             CAD, CHF    Coronary artery disease Mother        Heart failure Mother        Heart disease Father             had MI, CAD    Coronary artery disease Father        Heart attack Father                Allergies  Tramadol, Codeine, and Hydrocodone      Overnight Events:    Echo and imaging results     Objective Data:  Last Recorded Vitals:  Vitals:    24 0600 24 0800 24 0824 24 1200   BP:  136/63  125/69   BP Location:  Right arm  Right arm   Patient Position:  Lying  Lying   Pulse:  65  73   Resp:  18  18   Temp:  36.2 °C (97.2 °F)  36.2 °C (97.2 °F)   TempSrc:  Temporal  Temporal   SpO2:  92% 95% 99%   Weight: 54.4 kg (119 lb 14.9 oz)      Height:           Last Labs:  CBC - 2024:  6:24 AM  9.4 12.1 155    38.4      CMP - 2024:  6:24 AM  8.7 5.6 10 --- 0.5   3.4 2.9 6 52      PTT - No results in last year.  1.2   13.2 _     TROPHS   Date/Time Value Ref Range Status   2024 04:17 PM 11 0 - 13 ng/L Final   2024 02:50 PM 12 0 - 13 ng/L Final   2023 01:04 PM 5 0 - 13 ng/L Final     BNP   Date/Time Value Ref Range Status   2024 02:50  0 - 99 pg/mL Final     HGBA1C   Date/Time Value Ref Range Status   2021 06:33 AM 5.9 % Final     Comment:          Diagnosis of Diabetes-Adults   Non-Diabetic: < or = 5.6%   Increased risk for developing diabetes: 5.7-6.4%   Diagnostic of diabetes: > or = 6.5%  .       Monitoring  of Diabetes                Age (y)     Therapeutic Goal (%)   Adults:          >18           <7.0   Pediatrics:    13-18           <7.5                   7-12           <8.0                   0- 6            7.5-8.5   American Diabetes Association. Diabetes Care 33(S1), Jan 2010.       VLDL   Date/Time Value Ref Range Status   04/21/2021 06:33 AM 19 0 - 40 mg/dL Final      Last I/O:  I/O last 3 completed shifts:  In: 407.3 (7.5 mL/kg) [P.O.:360; I.V.:47.3 (0.9 mL/kg)]  Out: 400 (7.4 mL/kg) [Urine:400 (0.2 mL/kg/hr)]  Weight: 54.4 kg     Past Cardiology Tests (Last 3 Years):  EKG:  Electrocardiogram, 12-lead PRN ACS symptoms 07/17/2024 (Preliminary)      ECG 12 lead 07/17/2024 (Preliminary)      ECG 12 lead 12/08/2023    Echo:  Transthoracic Echo (TTE) Complete 07/18/2024    Ejection Fractions:  EF   Date/Time Value Ref Range Status   07/18/2024 02:06 PM 65 %      Cath:  No results found for this or any previous visit from the past 1095 days.    Stress Test:  No results found for this or any previous visit from the past 1095 days.    Cardiac Imaging:  No results found for this or any previous visit from the past 1095 days.      Inpatient Medications:  Scheduled medications   Medication Dose Route Frequency    atenolol  25 mg oral Nightly    cefTRIAXone  1 g intravenous q24h    docusate sodium  100 mg oral BID    ipratropium-albuteroL  3 mL nebulization q6h    methocarbamol  500 mg oral q8h ABBY    oxygen   inhalation Continuous - Inhalation    pantoprazole  40 mg oral Daily before breakfast    Or    pantoprazole  40 mg intravenous Daily before breakfast     PRN medications   Medication    acetaminophen    bisacodyl    heparin    melatonin    ondansetron ODT    Or    ondansetron     Continuous Medications   Medication Dose Last Rate    heparin  0-4,500 Units/hr 800 Units/hr (07/18/24 2140)     Results for orders placed or performed during the hospital encounter of 07/17/24 (from the past 24 hour(s))   Transthoracic  Echo (TTE) Complete   Result Value Ref Range    AV pk ludivina 2.35 m/s    LV Biplane EF 65 %    LVOT diam 2.00 cm    MV E/A ratio 0.50     Tricuspid annular plane systolic excursion 2.1 cm    AV mn grad 10.0 mmHg    LV EF 65 %    RV free wall pk S' 9.79 cm/s    RVSP 66.7 mmHg    LVIDd 3.30 cm    Aortic Valve Area by Continuity of Peak Velocity 2.51 cm2    AV pk grad 22.1 mmHg    Aortic Valve Area by Continuity of VTI 2.90 cm2    LV A4C EF 70.3    Heparin Assay, UFH   Result Value Ref Range    Heparin Unfractionated 0.5 See Comment Below for Therapeutic Ranges IU/mL   Heparin Assay, UFH   Result Value Ref Range    Heparin Unfractionated 0.4 See Comment Below for Therapeutic Ranges IU/mL   CBC   Result Value Ref Range    WBC 9.4 4.4 - 11.3 x10*3/uL    nRBC 0.0 0.0 - 0.0 /100 WBCs    RBC 4.03 4.00 - 5.20 x10*6/uL    Hemoglobin 12.1 12.0 - 16.0 g/dL    Hematocrit 38.4 36.0 - 46.0 %    MCV 95 80 - 100 fL    MCH 30.0 26.0 - 34.0 pg    MCHC 31.5 (L) 32.0 - 36.0 g/dL    RDW 13.9 11.5 - 14.5 %    Platelets 155 150 - 450 x10*3/uL   Magnesium   Result Value Ref Range    Magnesium 2.10 1.60 - 2.40 mg/dL   Hepatic Function Panel   Result Value Ref Range    Albumin 2.9 (L) 3.4 - 5.0 g/dL    Bilirubin, Total 0.5 0.0 - 1.2 mg/dL    Bilirubin, Direct 0.1 0.0 - 0.3 mg/dL    Alkaline Phosphatase 52 33 - 136 U/L    ALT 6 (L) 7 - 45 U/L    AST 10 9 - 39 U/L    Total Protein 5.6 (L) 6.4 - 8.2 g/dL   Phosphorus   Result Value Ref Range    Phosphorus 3.4 2.5 - 4.9 mg/dL   Basic Metabolic Panel   Result Value Ref Range    Glucose 102 (H) 74 - 99 mg/dL    Sodium 138 136 - 145 mmol/L    Potassium 4.0 3.5 - 5.3 mmol/L    Chloride 107 98 - 107 mmol/L    Bicarbonate 26 21 - 32 mmol/L    Anion Gap 9 (L) 10 - 20 mmol/L    Urea Nitrogen 27 (H) 6 - 23 mg/dL    Creatinine 0.79 0.50 - 1.05 mg/dL    eGFR 69 >60 mL/min/1.73m*2    Calcium 8.7 8.6 - 10.3 mg/dL   Heparin Assay   Result Value Ref Range    Heparin Unfractionated 0.4 See Comment Below for  Therapeutic Ranges IU/mL       Physical Exam:  Subjective:   Examination:   General Examination:   General Appearance: Well developed, well nourished, in no acute distress.   Head: normocephalic, atraumatic   Eyes: Anicteric sclera. Pupils are equally round and reactive to light.  Extraocular movements are intact.    Ears: normal   Oral: Cavity: mucosa moist.   Throat: clear.   Neck/Thyroid: neck supple, full range of motion, no cervical , JVP, lymphadenopathy.   Skin: warm and dry, no suspicious lesions.    Heart: regular rate and rhythm, S1, S2 normal, no murmurs.   Lungs: clear to auscultation bilaterally.   Abdomen: soft, non-tender, non-distended, bowl sound present, normal.   Extremities: no clubbing, no cyanosis, bilateral DVT with edema  Neuro: non-focal, motor strength normal upper lower extremities, sensory exam intact.       Assessment/Plan   I26.9 Concern for pulmonary embolism with RV strain  I25.1 Coronary disease-remote LAD stents 2011 normal cath 2016 ACS with right coronary PDA stent LAD angioplasty and diagonal angioplasty 2021  I48.91 Atrial fibrillation for years on Coumadin declined Eliquis stopped Coumadin after recurrent bladder hemorrhages and after fall with right femur fracture at age 89  I10.0 Hypertension          pulmonary emboli, bilateral DVTs   CT for PE showed acute pulmonary emboli in left upper lobe with RV:LV ratio 1.07. Vascular US BLE Venous Duplex showed age indeterminate DVT in popliteal and small saphenous veins. Acute nonocclusive DVT proximal femoral and age indeterminate DVT in profunda, poplitea, and peroneal veins. Free floating thrombus in the common femoral vein.       Echo Mild Aortic stenosis, normal LV, Moderately dilated LV and pulmonary HTN  64/10      Code Status:  DNR and No Intubation    I spent 39 minutes in the professional and overall care of this patient.        Kvng Hein MD

## 2024-07-19 NOTE — PROGRESS NOTES
Noemí Hinton is a 89 y.o. female on day 2 of admission presenting with Pneumonia due to infectious organism, unspecified laterality, unspecified part of lung.    Subjective   No  cp       Objective         Current Facility-Administered Medications:     acetaminophen (Tylenol) tablet 650 mg, 650 mg, oral, q4h PRN, LAURITA Dunlap-C, 650 mg at 07/18/24 2139    atenolol (Tenormin) tablet 25 mg, 25 mg, oral, Nightly, LAURITA Dunlap-C, 25 mg at 07/18/24 2140    azithromycin (Zithromax) tablet 500 mg, 500 mg, oral, q24h, Candice Anne PA-C, 500 mg at 07/18/24 1657    bisacodyl (Dulcolax) EC tablet 10 mg, 10 mg, oral, Daily PRN, Candice Anne PA-C    cefTRIAXone (Rocephin) 1 g in dextrose (iso) IV 50 mL, 1 g, intravenous, q24h, Candice Anne PA-C, Stopped at 07/18/24 1727    docusate sodium (Colace) capsule 100 mg, 100 mg, oral, BID, Candice Anne PA-C, 100 mg at 07/19/24 0852    heparin (porcine) injection 2,000-4,000 Units, 2,000-4,000 Units, intravenous, q4h PRN, LAURITA Dunlap-JONATHON    heparin 25,000 Units in dextrose 5% 250 mL (100 Units/mL) infusion (premix), 0-4,500 Units/hr, intravenous, Continuous, Candice Anne PA-C, Last Rate: 8 mL/hr at 07/18/24 2140, 800 Units/hr at 07/18/24 2140    ipratropium-albuteroL (Duo-Neb) 0.5-2.5 mg/3 mL nebulizer solution 3 mL, 3 mL, nebulization, q6h, LAURITA Dunlap-C, 3 mL at 07/19/24 0819    melatonin tablet 3 mg, 3 mg, oral, Nightly PRN, LAURITA Dunlap-C, 3 mg at 07/18/24 2140    methocarbamol (Robaxin) tablet 500 mg, 500 mg, oral, q8h ABBY, Candice Anne PA-C, 500 mg at 07/19/24 0644    ondansetron ODT (Zofran-ODT) disintegrating tablet 4 mg, 4 mg, oral, q8h PRN **OR** ondansetron (Zofran) injection 4 mg, 4 mg, intravenous, q8h PRN, Candice Anne PA-C    oxygen (O2) therapy, , inhalation, Continuous - Inhalation, Candice Anne PA-C, 3 L/min at 07/19/24 0800    pantoprazole (ProtoNix) EC tablet 40 mg, 40 mg, oral, Daily  "before breakfast, 40 mg at 07/18/24 0600 **OR** pantoprazole (ProtoNix) injection 40 mg, 40 mg, intravenous, Daily before breakfast, Candice Anne PA-C, 40 mg at 07/19/24 0644       Physical Exam      Last Recorded Vitals  Blood pressure 136/63, pulse 65, temperature 36.2 °C (97.2 °F), temperature source Temporal, resp. rate 18, height 1.727 m (5' 8\"), weight 54.4 kg (119 lb 14.9 oz), SpO2 95%.  Intake/Output last 3 Shifts:  I/O last 3 completed shifts:  In: 407.3 (7.5 mL/kg) [P.O.:360; I.V.:47.3 (0.9 mL/kg)]  Out: 400 (7.4 mL/kg) [Urine:400 (0.2 mL/kg/hr)]  Weight: 54.4 kg     Labs:       Results for orders placed or performed during the hospital encounter of 07/17/24 (from the past 24 hour(s))   Transthoracic Echo (TTE) Complete   Result Value Ref Range    BSA 1.64 m2   Heparin Assay, UFH   Result Value Ref Range    Heparin Unfractionated 0.5 See Comment Below for Therapeutic Ranges IU/mL   Heparin Assay, UFH   Result Value Ref Range    Heparin Unfractionated 0.4 See Comment Below for Therapeutic Ranges IU/mL   CBC   Result Value Ref Range    WBC 9.4 4.4 - 11.3 x10*3/uL    nRBC 0.0 0.0 - 0.0 /100 WBCs    RBC 4.03 4.00 - 5.20 x10*6/uL    Hemoglobin 12.1 12.0 - 16.0 g/dL    Hematocrit 38.4 36.0 - 46.0 %    MCV 95 80 - 100 fL    MCH 30.0 26.0 - 34.0 pg    MCHC 31.5 (L) 32.0 - 36.0 g/dL    RDW 13.9 11.5 - 14.5 %    Platelets 155 150 - 450 x10*3/uL   Magnesium   Result Value Ref Range    Magnesium 2.10 1.60 - 2.40 mg/dL   Hepatic Function Panel   Result Value Ref Range    Albumin 2.9 (L) 3.4 - 5.0 g/dL    Bilirubin, Total 0.5 0.0 - 1.2 mg/dL    Bilirubin, Direct 0.1 0.0 - 0.3 mg/dL    Alkaline Phosphatase 52 33 - 136 U/L    ALT 6 (L) 7 - 45 U/L    AST 10 9 - 39 U/L    Total Protein 5.6 (L) 6.4 - 8.2 g/dL   Phosphorus   Result Value Ref Range    Phosphorus 3.4 2.5 - 4.9 mg/dL   Basic Metabolic Panel   Result Value Ref Range    Glucose 102 (H) 74 - 99 mg/dL    Sodium 138 136 - 145 mmol/L    Potassium 4.0 3.5 - 5.3 " mmol/L    Chloride 107 98 - 107 mmol/L    Bicarbonate 26 21 - 32 mmol/L    Anion Gap 9 (L) 10 - 20 mmol/L    Urea Nitrogen 27 (H) 6 - 23 mg/dL    Creatinine 0.79 0.50 - 1.05 mg/dL    eGFR 69 >60 mL/min/1.73m*2    Calcium 8.7 8.6 - 10.3 mg/dL   Heparin Assay   Result Value Ref Range    Heparin Unfractionated 0.4 See Comment Below for Therapeutic Ranges IU/mL              Assessment/Plan   Principal Problem:    Pneumonia due to infectious organism, unspecified laterality, unspecified part of lung  Acute LLL PE with RV strain  B/l DVT  A fib  Breast mass      PLAN: Pt remains on IV heparin drip per protocol, consider switching to oral DOAC, med list and labs reviewed for now c/w current Rx, d/w CNP, follow closely.         Dino Retana MD

## 2024-07-19 NOTE — CARE PLAN
Problem: Pain - Adult  Goal: Verbalizes/displays adequate comfort level or baseline comfort level  7/19/2024 1741 by Camelia Headley RN  Outcome: Progressing  7/19/2024 0937 by Camelia Headley RN  Outcome: Progressing     Problem: Safety - Adult  Goal: Free from fall injury  7/19/2024 1741 by Camelia Headley RN  Outcome: Progressing  7/19/2024 0937 by Camelia Headley RN  Outcome: Progressing     Problem: Discharge Planning  Goal: Discharge to home or other facility with appropriate resources  7/19/2024 1741 by Camelia Headley RN  Outcome: Progressing  7/19/2024 0937 by Camelia Headley RN  Outcome: Progressing     Problem: Chronic Conditions and Co-morbidities  Goal: Patient's chronic conditions and co-morbidity symptoms are monitored and maintained or improved  7/19/2024 1741 by Camelia Headley RN  Outcome: Progressing  7/19/2024 0937 by Camelia Headley RN  Outcome: Progressing     Problem: Pain  Goal: Takes deep breaths with improved pain control throughout the shift  7/19/2024 1741 by Camelia Headley RN  Outcome: Progressing  7/19/2024 0937 by Camelia Headley RN  Outcome: Progressing     Problem: Skin  Goal: Decreased wound size/increased tissue granulation at next dressing change  7/19/2024 1741 by Camelia Headley RN  Outcome: Progressing  7/19/2024 0937 by Camelia Headley RN  Outcome: Progressing      No acute events occurred and safety was maintained. No c/o pain or discomfort. Pt updated by cardiology at bedside; stated understanding and has no questions for this RN. Pt resting in bed with call light within reach.     Camelia Headley RN

## 2024-07-19 NOTE — CONSULTS
Inpatient consult to Vascular Surgery  Consult performed by: KELSEA Farooq-CNP  Consult ordered by: Candice Anne PA-C  Reason for consult: pulmonary emboli, bilateral DVTs        History Of Present Illness:    Noemí Hinton is a 89 y.o. female presented to [facility] with shortness of breath, back pain with inspiration, and hypoxia. CT for PE showed acute pulmonary emboli in left upper lobe with RV:LV ratio 1.07. Vascular US BLE Venous Duplex showed age indeterminate DVT in popliteal and small saphenous veins. Acute nonocclusive DVT proximal femoral and age indeterminate DVT in profunda, poplitea, and peroneal veins. Free floating thrombus in the common femoral vein. Order for TTE, completed and pending final read. Patient started on Heparin gtt. Vascular Surgery consulted for pulmonary emboli and bilateral DVT.     Patient presents unaccompanied in bed. She is tearful after losing her  of 68 years in the last year during a previous hospitalization. Has been living at home with daughter. Denies chest pain. Back pain w/inspiration has resolved. Denies palpitations. Denies SOB on exertion. Endores orthopnea for the last year, sleeps in recliner at home. Denies lightheadedness, dizzines, and syncope. Endorses intermittent LLE edema s/p left ORIF 11/2019. Eating/drinking well. Denies N/V/D. Denies s/s of bleeding.     Past Medical History:  She has a past medical history of Anxiety (09/14/2023), Arthritis, Atrial fibrillation (Multi) (08/24/2023), Breast mass, right (2019), Coronary artery disease due to lipid rich plaque (08/24/2023), GERD (gastroesophageal reflux disease) (08/24/2023), Hip fracture, left (Multi) (08/24/2023), History of femur fracture, HLD (hyperlipidemia), Incontinence of urine, Myocardial infarction (Multi), Neuropathy (09/21/2023), and Sciatica.    Past Surgical History:  She has a past surgical history that includes Coronary angioplasty with stent; Hip fracture surgery (Left);  ORIF femur fracture (Left )); and  section, classic.      Social History:  She reports that she has never smoked. She has never used smokeless tobacco. She reports that she does not drink alcohol and does not use drugs.    Family History:  Family History   Problem Relation Name Age of Onset    Heart disease Mother          CAD, CHF    Coronary artery disease Mother      Heart failure Mother      Heart disease Father          had MI, CAD    Coronary artery disease Father      Heart attack Father       Last Recorded Vitals:  Vitals:    24 0136 24 0600 24 0800 24 0824   BP:   136/63    BP Location:   Right arm    Patient Position:   Lying    Pulse:   65    Resp:   18    Temp:   36.2 °C (97.2 °F)    TempSrc:   Temporal    SpO2: 94%  92% 95%   Weight:  54.4 kg (119 lb 14.9 oz)     Height:           Last Labs:  CBC - 2024:  6:24 AM  9.4 12.1 155    38.4      CMP - 2024:  6:24 AM  8.7 5.6 10 --- 0.5   3.4 2.9 6 52      PTT - No results in last year.  1.2   13.2 _     Troponin I, High Sensitivity   Date/Time Value Ref Range Status   2024 04:17 PM 11 0 - 13 ng/L Final   2024 02:50 PM 12 0 - 13 ng/L Final   2023 01:04 PM 5 0 - 13 ng/L Final     BNP   Date/Time Value Ref Range Status   2024 02:50  (H) 0 - 99 pg/mL Final     Hemoglobin A1C   Date/Time Value Ref Range Status   2021 06:33 AM 5.9 % Final     Comment:          Diagnosis of Diabetes-Adults   Non-Diabetic: < or = 5.6%   Increased risk for developing diabetes: 5.7-6.4%   Diagnostic of diabetes: > or = 6.5%  .       Monitoring of Diabetes                Age (y)     Therapeutic Goal (%)   Adults:          >18           <7.0   Pediatrics:    13-18           <7.5                   7-12           <8.0                   0- 6            7.5-8.5   American Diabetes Association. Diabetes Care 33(S1), 2010.       VLDL   Date/Time Value Ref Range Status   2021 06:33 AM 19 0 - 40 mg/dL  Final      Last I/O:  I/O last 3 completed shifts:  In: 407.3 (7.5 mL/kg) [P.O.:360; I.V.:47.3 (0.9 mL/kg)]  Out: 400 (7.4 mL/kg) [Urine:400 (0.2 mL/kg/hr)]  Weight: 54.4 kg     Physical Exam:  Constitutional:       General: She is awake. She is not in acute distress.     Appearance: Normal appearance. She is underweight. She is not ill-appearing, toxic-appearing or diaphoretic.   HENT:      Head: Normocephalic and atraumatic.      Nose: Nose normal.      Mouth/Throat:      Mouth: Mucous membranes are moist.   Eyes:      General: No scleral icterus.  Cardiovascular:      Rate and Rhythm: Regular rate and rhythm.     Heart sounds: No murmur heard.     No carotid bruit.      +2 palpable bilateral radial pulses.      +2 palpable bilateral femoral pulses.      Bilateral DP/PT with doppler.      Diffuse spider veins on bilateral lower extremities. No skin discoloration, erythema, or wounds.      No bilateral lower extremity edema noted.   Pulmonary:      Effort: No respiratory distress.      Breath sounds: No stridor. No wheezing or rhonchi.      3L O2 via NC.  Skin:     General: Skin is warm and dry.      Findings: No rash.   Neurological:      General: No focal deficit present.      Mental Status: She is alert and oriented to person, place, and time. Mental status is at baseline.   Psychiatric:         Mood and Affect: Teartful.       Behavior: Behavior normal. Behavior is cooperative.         Thought Content: Thought content normal.         Judgment: Judgment normal.     EKG:  ECG 12 lead 07/17/2024 (Preliminary)    Normal sinus rhythm  Inferior infarct (cited on or before 12-JAN-2015)  Abnormal ECG  When compared with ECG of 08-DEC-2023 14:16,  No significant change was found    Echo 4/21/2021  CONCLUSIONS:   1. The left ventricular systolic function is normal.   2. Mid and apical inferior septum and mid inferolateral segment are abnormal.   3. Poorly visualized anatomical structures due to suboptimal image  quality.   4. Increased LV mass.   5. Spectral Doppler shows an impaired relaxation pattern of left ventricular diastolic filling.   6. Slightly elevated RVSP.   7. Aortic valve sclerosis.    Cath 4/20/21:  CONCLUSIONS:   1. LAD occluded in mid segment at origin of previously deployed stent. Very faint collaterals to the distaql LAD from the diagonal branch.   2. The right posterior descending artery showed severe in-stent restenosis.    Vascular Imaging:  CT for PE 7/178/24   IMPRESSION:  1. Study degraded by motion artifact. Acute pulmonary emboli at the  left upper lobe. RV to LV ratio 1.07.  2. Cardiomegaly. Coronary artery calcifications.  3. Small left pleural effusion with atelectasis at the left lower  lobe. Mild atelectasis at the right lower lobe.  4. 1.3 cm nodular density at the anterior aspect at the base of the  right lower lobe, may be secondary to atelectasis versus pulmonary  nodule. Follow-up CT chest in 3 months versus PET/CT recommended for  further evaluation.  5. Enlarged thyroid gland. Please correlate with laboratory values.  Evaluation with ultrasound as clinically warranted.  6. Multiple lobulated densities at the right breast with a 5.4 x 4.7  cm heterogeneous mass, increased since prior CT 04/20/2021,  concerning for breast malignancy. [facility]  recommended for further evaluation.    Vascular US Lower Extremity Venous Duplex Bilateral 7/17/24  CONCLUSIONS:  Right Lower Venous: There is age indeterminate deep vein thrombosis visualized in the popliteal vein. The remainder of the right leg is negative for deep vein thrombosis. There is age indeterminate thrombosis visualized in the small saphenous vein.  Left Lower Venous: There is acute non-occlusive deep vein thrombosis visualized in the proximal femoral vein. There is age indeterminate deep vein thrombosis visualized in the profunda, popliteal and peroneal veins. There is free floating thrombosis visualized in the  common femoral vein. The remainder of the left leg is negative for deep vein thrombosis.  Additional Findings:  Technically difficult exam due to patient positioning.      Allergies:  Tramadol, Codeine, and Hydrocodone    Inpatient Medications:  Scheduled medications   Medication Dose Route Frequency    atenolol  25 mg oral Nightly    azithromycin  500 mg oral q24h    cefTRIAXone  1 g intravenous q24h    docusate sodium  100 mg oral BID    ipratropium-albuteroL  3 mL nebulization q6h    methocarbamol  500 mg oral q8h ABBY    oxygen   inhalation Continuous - Inhalation    pantoprazole  40 mg oral Daily before breakfast    Or    pantoprazole  40 mg intravenous Daily before breakfast     PRN medications   Medication    acetaminophen    bisacodyl    heparin    melatonin    ondansetron ODT    Or    ondansetron     Continuous Medications   Medication Dose Last Rate    heparin  0-4,500 Units/hr 800 Units/hr (07/18/24 2140)     Outpatient Medications:  Current Outpatient Medications   Medication Instructions    atenolol (TENORMIN) 25 mg, oral, Nightly    gabapentin (NEURONTIN) 300 mg, oral, 2 times daily    lidocaine (Lidoderm) 5 % patch APPLY TO LEFT OR RIGHT SHOULDER 1 PATCH TOPICALLY ONE TIME A DAY AS NEEDED (ON FOR 12 HOURS AND OFF FOR 12 HOURS)    nystatin (Mycostatin) 100,000 unit/gram powder 1 Application, Topical, 2 times daily, Apply under breasts    PharbetoL 325 mg, oral, Daily       Assessment/Plan   Left Lower Lobe Pulmonary Emboli  Back pain w/inspiration improved per patient. SOB on exertion improved.   SpO2 95% on 3L O2. Lungs CTAB.   EKG 7/17/24 NSR. Current telemetry NSR. Hx of Afib previously treated with Warfarin. Discontinued 12/2023 due to hospitalization for acute bladder hemorrhage with acute anemia.   CT for PE 7/17/24 w/ acute pulmonary emboli at the left upper lobe. RV to LV ratio 1.07.  TTE to assess for RV strain, completed & pending final read. BNP 7/17/24 675pg/ml.   Occurred in setting of hx  of pAFib (inability to tolerate Warfarin 2/2 bladder hemorrhage), Left Hip ORIF 8/2023 w/discharge to SNF & limited mobility w/rehospitalization 12/2023, possible breast CA and increased age.   Currently treated w/Heparin gtt w/therapeutic assays.   Denies acute s/s of bleeding. CBC stable overnight.   HAS-BLED score 3 points w/high risk for major bleeding.     Bilateral DVTs  Reports intermittent LLE edema. Denies pain/paresthesias.   No skin discoloration, erythema, or wounds. No bilateral lower extremity edema noted on exam.   Vascular US Lower Extremity Venous Duplex Bilateral 7/17/24 Right Lower Venous: age indeterminate deep vein thrombosis visualized in the popliteal vein. The remainder of the right leg is negative for deep vein thrombosis. There is age indeterminate thrombosis visualized in the small saphenous vein. Left Lower Venous: There is acute non-occlusive deep vein thrombosis visualized in the proximal femoral vein. There is age indeterminate deep vein thrombosis visualized in the profunda, popliteal and peroneal veins. There is free floating thrombosis visualized in the common femoral vein. The remainder of the left leg is negative for deep vein thrombosis.  Occurred in setting of hx of pAFib (inability to tolerate Warfarin 2/2 bladder hemorrhage), Left Hip ORIF 8/2023 w/discharge to SNF & limited mobility w/rehospitalization 12/2023, possible breast CA and increased age.   Currently treated w/Heparin gtt w/therapeutic assays.  Denies acute s/s of bleeding. CBC stable overnight.   HAS-BLED score 3 points w/high risk for major bleeding.     Weigh risk/benefits of AC. If oral anticoagulation is pursued, would recommend 3 months of therapy if tolerated.     Discussed plan of care with Dr.Yulanka Pinedo.   I spent 35  minutes in the professional and overall care of this patient.    Nicole Gastelum, APRN-CNP  Vascular Surgery   [facility]  [facility]  [facility]

## 2024-07-19 NOTE — PROGRESS NOTES
Spiritual Care Visit    Clinical Encounter Type  Visited With: Patient (I visited the patient on 7/18.)  Routine Visit: Introduction  Continue Visiting: Yes    Roman Catholic Encounters  Roman Catholic Needs: Prayer, Roman Catholic articles         Sacramental Encounters  Communion: Patient wants communion  Communion Given Indicator: Yes  Sacrament of Sick-Anointing: Anointed, Patient requested anointing                             Taxonomy  Intended Effects: Establish rapport and connectedness, Moraima affirmation, Build relationship of care and support, Demonstrate caring and concern, Promote sense of peace, Helping someone feel comforted

## 2024-07-20 LAB
ALBUMIN SERPL BCP-MCNC: 3 G/DL (ref 3.4–5)
ALP SERPL-CCNC: 58 U/L (ref 33–136)
ALT SERPL W P-5'-P-CCNC: 5 U/L (ref 7–45)
ANION GAP SERPL CALC-SCNC: 12 MMOL/L (ref 10–20)
AST SERPL W P-5'-P-CCNC: 10 U/L (ref 9–39)
BACTERIA UR CULT: NO GROWTH
BILIRUB DIRECT SERPL-MCNC: 0.1 MG/DL (ref 0–0.3)
BILIRUB SERPL-MCNC: 0.6 MG/DL (ref 0–1.2)
BUN SERPL-MCNC: 22 MG/DL (ref 6–23)
CALCIUM SERPL-MCNC: 8.7 MG/DL (ref 8.6–10.3)
CHLORIDE SERPL-SCNC: 106 MMOL/L (ref 98–107)
CO2 SERPL-SCNC: 25 MMOL/L (ref 21–32)
CREAT SERPL-MCNC: 0.68 MG/DL (ref 0.5–1.05)
EGFRCR SERPLBLD CKD-EPI 2021: 80 ML/MIN/1.73M*2
ERYTHROCYTE [DISTWIDTH] IN BLOOD BY AUTOMATED COUNT: 14 % (ref 11.5–14.5)
GLUCOSE SERPL-MCNC: 99 MG/DL (ref 74–99)
HCT VFR BLD AUTO: 41.5 % (ref 36–46)
HGB BLD-MCNC: 12.7 G/DL (ref 12–16)
MAGNESIUM SERPL-MCNC: 2.16 MG/DL (ref 1.6–2.4)
MCH RBC QN AUTO: 29.1 PG (ref 26–34)
MCHC RBC AUTO-ENTMCNC: 30.6 G/DL (ref 32–36)
MCV RBC AUTO: 95 FL (ref 80–100)
NRBC BLD-RTO: 0 /100 WBCS (ref 0–0)
PHOSPHATE SERPL-MCNC: 2.8 MG/DL (ref 2.5–4.9)
PLATELET # BLD AUTO: 164 X10*3/UL (ref 150–450)
POTASSIUM SERPL-SCNC: 4.2 MMOL/L (ref 3.5–5.3)
PROT SERPL-MCNC: 5.7 G/DL (ref 6.4–8.2)
RBC # BLD AUTO: 4.37 X10*6/UL (ref 4–5.2)
SODIUM SERPL-SCNC: 139 MMOL/L (ref 136–145)
UFH PPP CHRO-ACNC: 0.3 IU/ML
WBC # BLD AUTO: 8.2 X10*3/UL (ref 4.4–11.3)

## 2024-07-20 PROCEDURE — 85027 COMPLETE CBC AUTOMATED: CPT | Performed by: PHYSICIAN ASSISTANT

## 2024-07-20 PROCEDURE — 1200000002 HC GENERAL ROOM WITH TELEMETRY DAILY

## 2024-07-20 PROCEDURE — 36415 COLL VENOUS BLD VENIPUNCTURE: CPT | Performed by: PHYSICIAN ASSISTANT

## 2024-07-20 PROCEDURE — 84100 ASSAY OF PHOSPHORUS: CPT | Performed by: PHYSICIAN ASSISTANT

## 2024-07-20 PROCEDURE — 36415 COLL VENOUS BLD VENIPUNCTURE: CPT | Performed by: NURSE PRACTITIONER

## 2024-07-20 PROCEDURE — 83735 ASSAY OF MAGNESIUM: CPT | Performed by: PHYSICIAN ASSISTANT

## 2024-07-20 PROCEDURE — 85520 HEPARIN ASSAY: CPT | Performed by: NURSE PRACTITIONER

## 2024-07-20 PROCEDURE — 2500000005 HC RX 250 GENERAL PHARMACY W/O HCPCS: Performed by: INTERNAL MEDICINE

## 2024-07-20 PROCEDURE — 2500000001 HC RX 250 WO HCPCS SELF ADMINISTERED DRUGS (ALT 637 FOR MEDICARE OP): Performed by: PHYSICIAN ASSISTANT

## 2024-07-20 PROCEDURE — 2500000002 HC RX 250 W HCPCS SELF ADMINISTERED DRUGS (ALT 637 FOR MEDICARE OP, ALT 636 FOR OP/ED): Performed by: PHYSICIAN ASSISTANT

## 2024-07-20 PROCEDURE — 2500000004 HC RX 250 GENERAL PHARMACY W/ HCPCS (ALT 636 FOR OP/ED): Performed by: INTERNAL MEDICINE

## 2024-07-20 PROCEDURE — 2500000001 HC RX 250 WO HCPCS SELF ADMINISTERED DRUGS (ALT 637 FOR MEDICARE OP): Performed by: NURSE PRACTITIONER

## 2024-07-20 PROCEDURE — 94640 AIRWAY INHALATION TREATMENT: CPT

## 2024-07-20 PROCEDURE — 84075 ASSAY ALKALINE PHOSPHATASE: CPT | Performed by: PHYSICIAN ASSISTANT

## 2024-07-20 RX ORDER — DOCUSATE SODIUM 100 MG/1
100 CAPSULE, LIQUID FILLED ORAL 2 TIMES DAILY
Start: 2024-07-20

## 2024-07-20 RX ORDER — IPRATROPIUM BROMIDE AND ALBUTEROL SULFATE 2.5; .5 MG/3ML; MG/3ML
3 SOLUTION RESPIRATORY (INHALATION) EVERY 2 HOUR PRN
Status: DISCONTINUED | OUTPATIENT
Start: 2024-07-20 | End: 2024-07-22 | Stop reason: HOSPADM

## 2024-07-20 ASSESSMENT — COGNITIVE AND FUNCTIONAL STATUS - GENERAL
PERSONAL GROOMING: A LITTLE
DAILY ACTIVITIY SCORE: 18
DRESSING REGULAR LOWER BODY CLOTHING: A LOT
TOILETING: A LOT
TURNING FROM BACK TO SIDE WHILE IN FLAT BAD: A LITTLE
TOILETING: A LITTLE
WALKING IN HOSPITAL ROOM: A LOT
EATING MEALS: A LITTLE
HELP NEEDED FOR BATHING: A LOT
STANDING UP FROM CHAIR USING ARMS: A LOT
MOVING FROM LYING ON BACK TO SITTING ON SIDE OF FLAT BED WITH BEDRAILS: A LITTLE
MOVING FROM LYING ON BACK TO SITTING ON SIDE OF FLAT BED WITH BEDRAILS: A LITTLE
DRESSING REGULAR UPPER BODY CLOTHING: A LITTLE
MOBILITY SCORE: 13
MOBILITY SCORE: 16
STANDING UP FROM CHAIR USING ARMS: A LITTLE
DAILY ACTIVITIY SCORE: 16
CLIMB 3 TO 5 STEPS WITH RAILING: TOTAL
MOVING TO AND FROM BED TO CHAIR: A LOT
PERSONAL GROOMING: A LITTLE
TURNING FROM BACK TO SIDE WHILE IN FLAT BAD: A LITTLE
HELP NEEDED FOR BATHING: A LITTLE
MOVING TO AND FROM BED TO CHAIR: A LITTLE
DRESSING REGULAR UPPER BODY CLOTHING: A LITTLE
WALKING IN HOSPITAL ROOM: A LOT
CLIMB 3 TO 5 STEPS WITH RAILING: A LOT
DRESSING REGULAR LOWER BODY CLOTHING: A LITTLE

## 2024-07-20 ASSESSMENT — PAIN - FUNCTIONAL ASSESSMENT: PAIN_FUNCTIONAL_ASSESSMENT: 0-10

## 2024-07-20 ASSESSMENT — PAIN SCALES - GENERAL
PAINLEVEL_OUTOF10: 0 - NO PAIN
PAINLEVEL_OUTOF10: 0 - NO PAIN

## 2024-07-20 NOTE — PROGRESS NOTES
Noemí Hinton is a 89 y.o. female on day 3 of admission presenting with Pneumonia due to infectious organism, unspecified laterality, unspecified part of lung.    Subjective   No cp       Objective         Current Facility-Administered Medications:     acetaminophen (Tylenol) tablet 650 mg, 650 mg, oral, q4h PRN, Candice Anne PA-C, 650 mg at 07/18/24 2139    apixaban (Eliquis) tablet 10 mg, 10 mg, oral, BID, 10 mg at 07/20/24 1447 **FOLLOWED BY** [START ON 7/27/2024] apixaban (Eliquis) tablet 5 mg, 5 mg, oral, BID, Trina Stovall, APRN-CNP    atenolol (Tenormin) tablet 25 mg, 25 mg, oral, Nightly, Candice Anne PA-C, 25 mg at 07/19/24 2117    bisacodyl (Dulcolax) EC tablet 10 mg, 10 mg, oral, Daily PRN, Candice Anne PA-C    cefTRIAXone (Rocephin) 1 g in dextrose (iso) IV 50 mL, 1 g, intravenous, q24h, Zulay Sweeney MD, Last Rate: 100 mL/hr at 07/19/24 1709, 1 g at 07/19/24 1709    docusate sodium (Colace) capsule 100 mg, 100 mg, oral, BID, Candice Anne PA-C, 100 mg at 07/20/24 0900    ipratropium-albuteroL (Duo-Neb) 0.5-2.5 mg/3 mL nebulizer solution 3 mL, 3 mL, nebulization, q6h, Candice Anne PA-C, 3 mL at 07/20/24 1518    ipratropium-albuteroL (Duo-Neb) 0.5-2.5 mg/3 mL nebulizer solution 3 mL, 3 mL, nebulization, q2h PRN, Dino Retana MD    melatonin tablet 3 mg, 3 mg, oral, Nightly PRN, Candice Anne PA-C, 3 mg at 07/18/24 2140    ondansetron ODT (Zofran-ODT) disintegrating tablet 4 mg, 4 mg, oral, q8h PRN **OR** ondansetron (Zofran) injection 4 mg, 4 mg, intravenous, q8h PRN, Candice Anne PA-C    oxygen (O2) therapy, , inhalation, Continuous PRN - O2/gases, Dino Retana MD, 2 L/min at 07/20/24 1521    pantoprazole (ProtoNix) EC tablet 40 mg, 40 mg, oral, Daily before breakfast, 40 mg at 07/20/24 0612 **OR** pantoprazole (ProtoNix) injection 40 mg, 40 mg, intravenous, Daily before breakfast, Candice Anne PA-C, 40 mg at 07/19/24 0644       Physical  "Exam  HENT:      Head: Normocephalic.   Eyes:      Conjunctiva/sclera: Conjunctivae normal.   Cardiovascular:      Rate and Rhythm: Regular rhythm.   Pulmonary:      Breath sounds: Normal breath sounds.   Abdominal:      General: Bowel sounds are normal.      Palpations: Abdomen is soft.   Musculoskeletal:         General: Normal range of motion.   Skin:     General: Skin is warm and dry.   Neurological:      General: No focal deficit present.      Mental Status: She is alert.   Psychiatric:         Behavior: Behavior normal.           Last Recorded Vitals  Blood pressure 136/75, pulse 78, temperature 36.6 °C (97.9 °F), temperature source Temporal, resp. rate 18, height 1.727 m (5' 8\"), weight 54.6 kg (120 lb 5.9 oz), SpO2 93%.  Intake/Output last 3 Shifts:  I/O last 3 completed shifts:  In: - (0 mL/kg)   Out: 300 (5.5 mL/kg) [Urine:300 (0.2 mL/kg/hr)]  Weight: 54.6 kg     Labs:       Results for orders placed or performed during the hospital encounter of 07/17/24 (from the past 24 hour(s))   CBC   Result Value Ref Range    WBC 8.2 4.4 - 11.3 x10*3/uL    nRBC 0.0 0.0 - 0.0 /100 WBCs    RBC 4.37 4.00 - 5.20 x10*6/uL    Hemoglobin 12.7 12.0 - 16.0 g/dL    Hematocrit 41.5 36.0 - 46.0 %    MCV 95 80 - 100 fL    MCH 29.1 26.0 - 34.0 pg    MCHC 30.6 (L) 32.0 - 36.0 g/dL    RDW 14.0 11.5 - 14.5 %    Platelets 164 150 - 450 x10*3/uL   Magnesium   Result Value Ref Range    Magnesium 2.16 1.60 - 2.40 mg/dL   Hepatic Function Panel   Result Value Ref Range    Albumin 3.0 (L) 3.4 - 5.0 g/dL    Bilirubin, Total 0.6 0.0 - 1.2 mg/dL    Bilirubin, Direct 0.1 0.0 - 0.3 mg/dL    Alkaline Phosphatase 58 33 - 136 U/L    ALT 5 (L) 7 - 45 U/L    AST 10 9 - 39 U/L    Total Protein 5.7 (L) 6.4 - 8.2 g/dL   Phosphorus   Result Value Ref Range    Phosphorus 2.8 2.5 - 4.9 mg/dL   Basic Metabolic Panel   Result Value Ref Range    Glucose 99 74 - 99 mg/dL    Sodium 139 136 - 145 mmol/L    Potassium 4.2 3.5 - 5.3 mmol/L    Chloride 106 98 - 107 " mmol/L    Bicarbonate 25 21 - 32 mmol/L    Anion Gap 12 10 - 20 mmol/L    Urea Nitrogen 22 6 - 23 mg/dL    Creatinine 0.68 0.50 - 1.05 mg/dL    eGFR 80 >60 mL/min/1.73m*2    Calcium 8.7 8.6 - 10.3 mg/dL   Heparin Assay, UFH   Result Value Ref Range    Heparin Unfractionated 0.3 See Comment Below for Therapeutic Ranges IU/mL              Assessment/Plan   Principal Problem:    Pneumonia due to infectious organism, unspecified laterality, unspecified part of lung  Acute LLL PE with  RV strain  B/l DVT  A fib  Breast mass      PLAN: Patient was started on apixaban, heparin was DC'd, continue with IV CTX, slowly improving, discussed with CNP, if stable consider for DC in a day or 2            Dino Retana MD

## 2024-07-21 VITALS
SYSTOLIC BLOOD PRESSURE: 134 MMHG | WEIGHT: 120.37 LBS | HEIGHT: 68 IN | OXYGEN SATURATION: 95 % | DIASTOLIC BLOOD PRESSURE: 77 MMHG | TEMPERATURE: 97.7 F | RESPIRATION RATE: 17 BRPM | BODY MASS INDEX: 18.24 KG/M2 | HEART RATE: 84 BPM

## 2024-07-21 LAB
ALBUMIN SERPL BCP-MCNC: 3 G/DL (ref 3.4–5)
ALP SERPL-CCNC: 59 U/L (ref 33–136)
ALT SERPL W P-5'-P-CCNC: 6 U/L (ref 7–45)
ANION GAP SERPL CALC-SCNC: 11 MMOL/L (ref 10–20)
AST SERPL W P-5'-P-CCNC: 9 U/L (ref 9–39)
ATRIAL RATE: 80 BPM
ATRIAL RATE: 81 BPM
BACTERIA BLD CULT: NORMAL
BACTERIA BLD CULT: NORMAL
BILIRUB DIRECT SERPL-MCNC: 0.2 MG/DL (ref 0–0.3)
BILIRUB SERPL-MCNC: 0.6 MG/DL (ref 0–1.2)
BUN SERPL-MCNC: 20 MG/DL (ref 6–23)
CALCIUM SERPL-MCNC: 8.8 MG/DL (ref 8.6–10.3)
CHLORIDE SERPL-SCNC: 106 MMOL/L (ref 98–107)
CO2 SERPL-SCNC: 27 MMOL/L (ref 21–32)
CREAT SERPL-MCNC: 0.67 MG/DL (ref 0.5–1.05)
EGFRCR SERPLBLD CKD-EPI 2021: 81 ML/MIN/1.73M*2
ERYTHROCYTE [DISTWIDTH] IN BLOOD BY AUTOMATED COUNT: 13.7 % (ref 11.5–14.5)
GLUCOSE SERPL-MCNC: 106 MG/DL (ref 74–99)
HCT VFR BLD AUTO: 43.1 % (ref 36–46)
HGB BLD-MCNC: 13.1 G/DL (ref 12–16)
MAGNESIUM SERPL-MCNC: 2.14 MG/DL (ref 1.6–2.4)
MCH RBC QN AUTO: 29.4 PG (ref 26–34)
MCHC RBC AUTO-ENTMCNC: 30.4 G/DL (ref 32–36)
MCV RBC AUTO: 97 FL (ref 80–100)
NRBC BLD-RTO: 0 /100 WBCS (ref 0–0)
P AXIS: 66 DEGREES
P AXIS: 71 DEGREES
P OFFSET: 195 MS
P OFFSET: 197 MS
P ONSET: 139 MS
P ONSET: 145 MS
PHOSPHATE SERPL-MCNC: 3.3 MG/DL (ref 2.5–4.9)
PLATELET # BLD AUTO: 189 X10*3/UL (ref 150–450)
POTASSIUM SERPL-SCNC: 4 MMOL/L (ref 3.5–5.3)
PR INTERVAL: 150 MS
PR INTERVAL: 156 MS
PROT SERPL-MCNC: 5.8 G/DL (ref 6.4–8.2)
Q ONSET: 217 MS
Q ONSET: 220 MS
QRS COUNT: 13 BEATS
QRS COUNT: 13 BEATS
QRS DURATION: 78 MS
QRS DURATION: 80 MS
QT INTERVAL: 382 MS
QT INTERVAL: 384 MS
QTC CALCULATION(BAZETT): 442 MS
QTC CALCULATION(BAZETT): 443 MS
QTC FREDERICIA: 422 MS
QTC FREDERICIA: 423 MS
R AXIS: -29 DEGREES
R AXIS: -45 DEGREES
RBC # BLD AUTO: 4.45 X10*6/UL (ref 4–5.2)
SODIUM SERPL-SCNC: 140 MMOL/L (ref 136–145)
T AXIS: 47 DEGREES
T AXIS: 57 DEGREES
T OFFSET: 409 MS
T OFFSET: 411 MS
VENTRICULAR RATE: 80 BPM
VENTRICULAR RATE: 81 BPM
WBC # BLD AUTO: 7.6 X10*3/UL (ref 4.4–11.3)

## 2024-07-21 PROCEDURE — 85027 COMPLETE CBC AUTOMATED: CPT | Performed by: PHYSICIAN ASSISTANT

## 2024-07-21 PROCEDURE — 94640 AIRWAY INHALATION TREATMENT: CPT

## 2024-07-21 PROCEDURE — 2500000001 HC RX 250 WO HCPCS SELF ADMINISTERED DRUGS (ALT 637 FOR MEDICARE OP): Performed by: NURSE PRACTITIONER

## 2024-07-21 PROCEDURE — 84100 ASSAY OF PHOSPHORUS: CPT | Performed by: PHYSICIAN ASSISTANT

## 2024-07-21 PROCEDURE — 2500000001 HC RX 250 WO HCPCS SELF ADMINISTERED DRUGS (ALT 637 FOR MEDICARE OP): Performed by: PHYSICIAN ASSISTANT

## 2024-07-21 PROCEDURE — 36415 COLL VENOUS BLD VENIPUNCTURE: CPT | Performed by: PHYSICIAN ASSISTANT

## 2024-07-21 PROCEDURE — 83735 ASSAY OF MAGNESIUM: CPT | Performed by: PHYSICIAN ASSISTANT

## 2024-07-21 PROCEDURE — 2500000005 HC RX 250 GENERAL PHARMACY W/O HCPCS: Performed by: INTERNAL MEDICINE

## 2024-07-21 PROCEDURE — 2500000002 HC RX 250 W HCPCS SELF ADMINISTERED DRUGS (ALT 637 FOR MEDICARE OP, ALT 636 FOR OP/ED): Performed by: PHYSICIAN ASSISTANT

## 2024-07-21 PROCEDURE — 2500000004 HC RX 250 GENERAL PHARMACY W/ HCPCS (ALT 636 FOR OP/ED): Performed by: INTERNAL MEDICINE

## 2024-07-21 PROCEDURE — 84075 ASSAY ALKALINE PHOSPHATASE: CPT | Performed by: PHYSICIAN ASSISTANT

## 2024-07-21 PROCEDURE — 1200000002 HC GENERAL ROOM WITH TELEMETRY DAILY

## 2024-07-21 RX ORDER — PANTOPRAZOLE SODIUM 40 MG/1
40 TABLET, DELAYED RELEASE ORAL
Start: 2024-07-22

## 2024-07-21 ASSESSMENT — COGNITIVE AND FUNCTIONAL STATUS - GENERAL
PERSONAL GROOMING: A LITTLE
STANDING UP FROM CHAIR USING ARMS: A LITTLE
CLIMB 3 TO 5 STEPS WITH RAILING: A LITTLE
DRESSING REGULAR LOWER BODY CLOTHING: A LITTLE
MOVING TO AND FROM BED TO CHAIR: A LITTLE
MOVING FROM LYING ON BACK TO SITTING ON SIDE OF FLAT BED WITH BEDRAILS: A LITTLE
DRESSING REGULAR UPPER BODY CLOTHING: A LITTLE
WALKING IN HOSPITAL ROOM: A LITTLE
MOBILITY SCORE: 18
TURNING FROM BACK TO SIDE WHILE IN FLAT BAD: A LITTLE
DAILY ACTIVITIY SCORE: 18
EATING MEALS: A LITTLE
HELP NEEDED FOR BATHING: A LITTLE
TOILETING: A LITTLE

## 2024-07-21 ASSESSMENT — PAIN SCALES - GENERAL
PAINLEVEL_OUTOF10: 0 - NO PAIN
PAINLEVEL_OUTOF10: 0 - NO PAIN

## 2024-07-21 NOTE — DISCHARGE SUMMARY
Discharge Diagnosis  Pneumonia due to infectious organism, unspecified laterality, unspecified part of lung, Acute hypoxic resp failure, A fib    Issues Requiring Follow-Up  Pneumonia due to infectious organism, unspecified laterality, unspecified part of lung, Acute hypoxic resp failure, A fib    Discharge Meds     Your medication list        START taking these medications        Instructions Last Dose Given Next Dose Due   apixaban 5 mg tablet  Commonly known as: Eliquis  Start taking on: July 21, 2024      Take 2 tablets (10 mg) by mouth 2 times a day for 6 days, THEN 1 tablet (5 mg) 2 times a day.       docusate sodium 100 mg capsule  Commonly known as: Colace      Take 1 capsule (100 mg) by mouth 2 times a day.       pantoprazole 40 mg EC tablet  Commonly known as: ProtoNix  Start taking on: July 22, 2024      Take 1 tablet (40 mg) by mouth once daily in the morning. Take before meals. Do not crush, chew, or split.              CONTINUE taking these medications        Instructions Last Dose Given Next Dose Due   atenolol 25 mg tablet  Commonly known as: Tenormin                  STOP taking these medications      gabapentin 300 mg capsule  Commonly known as: Neurontin        lidocaine 5 % patch  Commonly known as: Lidoderm        nystatin 100,000 unit/gram powder  Commonly known as: Mycostatin        PharbetoL 325 mg tablet  Generic drug: acetaminophen                  Where to Get Your Medications        These medications were sent to [id number] [address] [facility]  [address] [address] [place] [address]      Phone: [phone]   apixaban 5 mg tablet       Information about where to get these medications is not yet available    Ask your nurse or doctor about these medications  docusate sodium 100 mg capsule  pantoprazole 40 mg EC tablet         Test Results Pending At Discharge  Pending Labs       Order Current Status    Extra Urine Gray Tube Collected (07/18/24 8778)     Urinalysis with Reflex Culture and Microscopic In process    Blood Culture Preliminary result    Blood Culture Preliminary result            Hospital Course   Pt is a 89 y.o. F with PMH of A fib, not on AC, ( due to bleeding from bladder) , CAD, s/p PCI and stent, GERD, HLD, Urinary incontinence, lt femur fx, presented to ER due to hypoxia, shortness of breath and mid back pain.  Pt was treated conservatively, was dcd in a relatively stable condition.    Pertinent Physical Exam At Time of Discharge  Physical Exam  HENT:      Head: Normocephalic.      Mouth/Throat:      Pharynx: Oropharynx is clear.   Eyes:      Conjunctiva/sclera: Conjunctivae normal.   Cardiovascular:      Rate and Rhythm: Regular rhythm.   Pulmonary:      Breath sounds: Normal breath sounds.   Abdominal:      General: Bowel sounds are normal.      Palpations: Abdomen is soft.   Musculoskeletal:         General: Normal range of motion.   Skin:     General: Skin is warm and dry.   Neurological:      General: No focal deficit present.      Mental Status: She is alert.   Psychiatric:         Behavior: Behavior normal.         Outpatient Follow-Up  Follow up with PCP      Dino Retana MD

## 2024-07-21 NOTE — NURSING NOTE
Shift note - patient admitted with PNA/PE.  There were no s/s acute respiratory distress or shortness of breath reported or observed.  Pulse ox stable on supplemental oxygen via nasal cannula.  Breathing treatments given by RT.  Eliquis given as ordered and without issue, no signs of bleeding.  The patient rested quietly over night.  Her vitals were stable and call light in reach. Comfort/safety rounds completed. Continue with plan of care, including precert for DC to [facility].

## 2024-07-21 NOTE — CARE PLAN
The patient's goals for the shift include no s/s respiratory distress reported or observed.     The clinical goals for the shift include remain hemodynamically stable.

## 2024-07-21 NOTE — PROGRESS NOTES
07/21/24 1555   Discharge Planning   Type of Post Acute Facility Services Skilled nursing     Per Case management notes, patient has pre cert . The 7000 physician certification notes need co signed. Message sent to Dr. Retana to sign. Charge nurse notified.

## 2024-07-21 NOTE — PROGRESS NOTES
Spiritual Care Visit    Clinical Encounter Type  Routine Visit: Follow-up  Continue Visiting: Yes    Taoism Encounters  Taoism Needs: Prayer         Sacramental Encounters  Communion: Patient wants communion  Communion Given Indicator: Yes                             Taxonomy  Intended Effects: Establish rapport and connectedness, Moraima affirmation, Build relationship of care and support, Demonstrate caring and concern

## 2024-07-21 NOTE — PROGRESS NOTES
Noemí Hinton is a 89 y.o. female on day 4 of admission presenting with Pneumonia due to infectious organism, unspecified laterality, unspecified part of lung.    Subjective   No cp/SOB       Objective         Current Facility-Administered Medications:     acetaminophen (Tylenol) tablet 650 mg, 650 mg, oral, q4h PRN, Candice Anne PA-C, 650 mg at 07/18/24 2139    apixaban (Eliquis) tablet 10 mg, 10 mg, oral, BID, 10 mg at 07/21/24 0904 **FOLLOWED BY** [START ON 7/27/2024] apixaban (Eliquis) tablet 5 mg, 5 mg, oral, BID, Trina Stovall, APRN-CNP    atenolol (Tenormin) tablet 25 mg, 25 mg, oral, Nightly, Candice Anne PA-C, 25 mg at 07/20/24 2108    bisacodyl (Dulcolax) EC tablet 10 mg, 10 mg, oral, Daily PRN, Candice Anne PA-C    cefTRIAXone (Rocephin) 1 g in dextrose (iso) IV 50 mL, 1 g, intravenous, q24h, Zulay Sweeney MD, Stopped at 07/20/24 1907    docusate sodium (Colace) capsule 100 mg, 100 mg, oral, BID, Candice Anne PA-C, 100 mg at 07/21/24 0904    ipratropium-albuteroL (Duo-Neb) 0.5-2.5 mg/3 mL nebulizer solution 3 mL, 3 mL, nebulization, q6h, Candice Anne PA-C, 3 mL at 07/21/24 1434    ipratropium-albuteroL (Duo-Neb) 0.5-2.5 mg/3 mL nebulizer solution 3 mL, 3 mL, nebulization, q2h PRN, Dino Retana MD    melatonin tablet 3 mg, 3 mg, oral, Nightly PRN, Candice Anne PA-C, 3 mg at 07/18/24 2140    ondansetron ODT (Zofran-ODT) disintegrating tablet 4 mg, 4 mg, oral, q8h PRN **OR** ondansetron (Zofran) injection 4 mg, 4 mg, intravenous, q8h PRN, Candice Anne PA-C    oxygen (O2) therapy, , inhalation, Continuous PRN - O2/gases, Dino Retana MD, 3 L/min at 07/21/24 0307    pantoprazole (ProtoNix) EC tablet 40 mg, 40 mg, oral, Daily before breakfast, 40 mg at 07/21/24 0611 **OR** pantoprazole (ProtoNix) injection 40 mg, 40 mg, intravenous, Daily before breakfast, Candice Anne PA-C, 40 mg at 07/19/24 0644       Physical Exam  HENT:      Head: Normocephalic.      " Mouth/Throat:      Pharynx: Oropharynx is clear.   Eyes:      Conjunctiva/sclera: Conjunctivae normal.   Cardiovascular:      Rate and Rhythm: Regular rhythm.   Pulmonary:      Breath sounds: Normal breath sounds.   Abdominal:      General: Bowel sounds are normal.      Palpations: Abdomen is soft.   Musculoskeletal:         General: Normal range of motion.   Skin:     General: Skin is warm and dry.   Neurological:      General: No focal deficit present.      Mental Status: She is alert.   Psychiatric:         Behavior: Behavior normal.           Last Recorded Vitals  Blood pressure 151/74, pulse 76, temperature 36.7 °C (98.1 °F), temperature source Temporal, resp. rate 16, height 1.727 m (5' 8\"), weight 54.6 kg (120 lb 5.9 oz), SpO2 93%.  Intake/Output last 3 Shifts:  I/O last 3 completed shifts:  In: 455.7 (8.3 mL/kg) [I.V.:455.7 (8.3 mL/kg)]  Out: 825 (15.1 mL/kg) [Urine:825 (0.4 mL/kg/hr)]  Weight: 54.6 kg     Labs:       Results for orders placed or performed during the hospital encounter of 07/17/24 (from the past 24 hour(s))   CBC   Result Value Ref Range    WBC 7.6 4.4 - 11.3 x10*3/uL    nRBC 0.0 0.0 - 0.0 /100 WBCs    RBC 4.45 4.00 - 5.20 x10*6/uL    Hemoglobin 13.1 12.0 - 16.0 g/dL    Hematocrit 43.1 36.0 - 46.0 %    MCV 97 80 - 100 fL    MCH 29.4 26.0 - 34.0 pg    MCHC 30.4 (L) 32.0 - 36.0 g/dL    RDW 13.7 11.5 - 14.5 %    Platelets 189 150 - 450 x10*3/uL   Magnesium   Result Value Ref Range    Magnesium 2.14 1.60 - 2.40 mg/dL   Hepatic Function Panel   Result Value Ref Range    Albumin 3.0 (L) 3.4 - 5.0 g/dL    Bilirubin, Total 0.6 0.0 - 1.2 mg/dL    Bilirubin, Direct 0.2 0.0 - 0.3 mg/dL    Alkaline Phosphatase 59 33 - 136 U/L    ALT 6 (L) 7 - 45 U/L    AST 9 9 - 39 U/L    Total Protein 5.8 (L) 6.4 - 8.2 g/dL   Phosphorus   Result Value Ref Range    Phosphorus 3.3 2.5 - 4.9 mg/dL   Basic Metabolic Panel   Result Value Ref Range    Glucose 106 (H) 74 - 99 mg/dL    Sodium 140 136 - 145 mmol/L    Potassium " 4.0 3.5 - 5.3 mmol/L    Chloride 106 98 - 107 mmol/L    Bicarbonate 27 21 - 32 mmol/L    Anion Gap 11 10 - 20 mmol/L    Urea Nitrogen 20 6 - 23 mg/dL    Creatinine 0.67 0.50 - 1.05 mg/dL    eGFR 81 >60 mL/min/1.73m*2    Calcium 8.8 8.6 - 10.3 mg/dL              Assessment/Plan   Principal Problem:    Pneumonia due to infectious organism, unspecified laterality, unspecified part of lung  Acute LLE PE  B/l DVT   A fib  Breast mass      PLAN: Pt is slowly improving, c/w oral Apixaben, monitor CBC, med list and labs reviewed for now c/w current Rx, SS is working on dc plan, GR was signed, d/w CNP, ok to dc when bed available, sheree closely.            Dino Retana MD

## 2024-07-22 VITALS
RESPIRATION RATE: 18 BRPM | HEART RATE: 75 BPM | TEMPERATURE: 96.8 F | DIASTOLIC BLOOD PRESSURE: 77 MMHG | WEIGHT: 120.59 LBS | HEIGHT: 68 IN | OXYGEN SATURATION: 95 % | SYSTOLIC BLOOD PRESSURE: 123 MMHG | BODY MASS INDEX: 18.28 KG/M2

## 2024-07-22 LAB
ALBUMIN SERPL BCP-MCNC: 3.1 G/DL (ref 3.4–5)
ALP SERPL-CCNC: 57 U/L (ref 33–136)
ALT SERPL W P-5'-P-CCNC: 6 U/L (ref 7–45)
ANION GAP SERPL CALC-SCNC: 11 MMOL/L (ref 10–20)
AST SERPL W P-5'-P-CCNC: 9 U/L (ref 9–39)
BACTERIA BLD CULT: NORMAL
BACTERIA BLD CULT: NORMAL
BILIRUB DIRECT SERPL-MCNC: 0.1 MG/DL (ref 0–0.3)
BILIRUB SERPL-MCNC: 0.7 MG/DL (ref 0–1.2)
BUN SERPL-MCNC: 23 MG/DL (ref 6–23)
CALCIUM SERPL-MCNC: 8.9 MG/DL (ref 8.6–10.3)
CHLORIDE SERPL-SCNC: 105 MMOL/L (ref 98–107)
CO2 SERPL-SCNC: 27 MMOL/L (ref 21–32)
CREAT SERPL-MCNC: 0.62 MG/DL (ref 0.5–1.05)
EGFRCR SERPLBLD CKD-EPI 2021: 82 ML/MIN/1.73M*2
ERYTHROCYTE [DISTWIDTH] IN BLOOD BY AUTOMATED COUNT: 13.7 % (ref 11.5–14.5)
GLUCOSE SERPL-MCNC: 112 MG/DL (ref 74–99)
HCT VFR BLD AUTO: 43.5 % (ref 36–46)
HGB BLD-MCNC: 13.4 G/DL (ref 12–16)
MAGNESIUM SERPL-MCNC: 2.1 MG/DL (ref 1.6–2.4)
MCH RBC QN AUTO: 29.4 PG (ref 26–34)
MCHC RBC AUTO-ENTMCNC: 30.8 G/DL (ref 32–36)
MCV RBC AUTO: 95 FL (ref 80–100)
NRBC BLD-RTO: 0 /100 WBCS (ref 0–0)
PHOSPHATE SERPL-MCNC: 2.8 MG/DL (ref 2.5–4.9)
PLATELET # BLD AUTO: 204 X10*3/UL (ref 150–450)
POTASSIUM SERPL-SCNC: 3.7 MMOL/L (ref 3.5–5.3)
PROT SERPL-MCNC: 6 G/DL (ref 6.4–8.2)
RBC # BLD AUTO: 4.56 X10*6/UL (ref 4–5.2)
SODIUM SERPL-SCNC: 139 MMOL/L (ref 136–145)
WBC # BLD AUTO: 8.1 X10*3/UL (ref 4.4–11.3)

## 2024-07-22 PROCEDURE — 2500000001 HC RX 250 WO HCPCS SELF ADMINISTERED DRUGS (ALT 637 FOR MEDICARE OP): Performed by: PHYSICIAN ASSISTANT

## 2024-07-22 PROCEDURE — 84100 ASSAY OF PHOSPHORUS: CPT | Performed by: PHYSICIAN ASSISTANT

## 2024-07-22 PROCEDURE — 83735 ASSAY OF MAGNESIUM: CPT | Performed by: PHYSICIAN ASSISTANT

## 2024-07-22 PROCEDURE — 85027 COMPLETE CBC AUTOMATED: CPT | Performed by: PHYSICIAN ASSISTANT

## 2024-07-22 PROCEDURE — 36415 COLL VENOUS BLD VENIPUNCTURE: CPT | Performed by: PHYSICIAN ASSISTANT

## 2024-07-22 PROCEDURE — 2500000005 HC RX 250 GENERAL PHARMACY W/O HCPCS: Performed by: INTERNAL MEDICINE

## 2024-07-22 PROCEDURE — 80076 HEPATIC FUNCTION PANEL: CPT | Performed by: PHYSICIAN ASSISTANT

## 2024-07-22 PROCEDURE — 94640 AIRWAY INHALATION TREATMENT: CPT

## 2024-07-22 PROCEDURE — 2500000002 HC RX 250 W HCPCS SELF ADMINISTERED DRUGS (ALT 637 FOR MEDICARE OP, ALT 636 FOR OP/ED): Performed by: NURSE PRACTITIONER

## 2024-07-22 PROCEDURE — 94664 DEMO&/EVAL PT USE INHALER: CPT

## 2024-07-22 PROCEDURE — 2500000002 HC RX 250 W HCPCS SELF ADMINISTERED DRUGS (ALT 637 FOR MEDICARE OP, ALT 636 FOR OP/ED): Performed by: PHYSICIAN ASSISTANT

## 2024-07-22 PROCEDURE — 80053 COMPREHEN METABOLIC PANEL: CPT | Performed by: PHYSICIAN ASSISTANT

## 2024-07-22 PROCEDURE — 2500000001 HC RX 250 WO HCPCS SELF ADMINISTERED DRUGS (ALT 637 FOR MEDICARE OP): Performed by: NURSE PRACTITIONER

## 2024-07-22 RX ORDER — POTASSIUM CHLORIDE 20 MEQ/1
20 TABLET, EXTENDED RELEASE ORAL ONCE
Status: COMPLETED | OUTPATIENT
Start: 2024-07-22 | End: 2024-07-22

## 2024-07-22 ASSESSMENT — COGNITIVE AND FUNCTIONAL STATUS - GENERAL
PERSONAL GROOMING: A LITTLE
MOVING FROM LYING ON BACK TO SITTING ON SIDE OF FLAT BED WITH BEDRAILS: A LITTLE
DAILY ACTIVITIY SCORE: 16
WALKING IN HOSPITAL ROOM: A LITTLE
MOVING TO AND FROM BED TO CHAIR: A LITTLE
STANDING UP FROM CHAIR USING ARMS: A LITTLE
WALKING IN HOSPITAL ROOM: A LOT
DRESSING REGULAR LOWER BODY CLOTHING: A LITTLE
HELP NEEDED FOR BATHING: A LOT
TOILETING: A LITTLE
CLIMB 3 TO 5 STEPS WITH RAILING: TOTAL
PERSONAL GROOMING: A LITTLE
MOBILITY SCORE: 13
MOVING TO AND FROM BED TO CHAIR: A LOT
MOVING FROM LYING ON BACK TO SITTING ON SIDE OF FLAT BED WITH BEDRAILS: A LITTLE
DRESSING REGULAR LOWER BODY CLOTHING: A LOT
DRESSING REGULAR UPPER BODY CLOTHING: A LITTLE
EATING MEALS: A LITTLE
DRESSING REGULAR UPPER BODY CLOTHING: A LITTLE
CLIMB 3 TO 5 STEPS WITH RAILING: A LITTLE
DAILY ACTIVITIY SCORE: 18
MOBILITY SCORE: 18
TURNING FROM BACK TO SIDE WHILE IN FLAT BAD: A LITTLE
TURNING FROM BACK TO SIDE WHILE IN FLAT BAD: A LITTLE
HELP NEEDED FOR BATHING: A LITTLE
STANDING UP FROM CHAIR USING ARMS: A LOT
TOILETING: A LOT

## 2024-07-22 ASSESSMENT — PAIN - FUNCTIONAL ASSESSMENT
PAIN_FUNCTIONAL_ASSESSMENT: 0-10
PAIN_FUNCTIONAL_ASSESSMENT: 0-10

## 2024-07-22 ASSESSMENT — PAIN SCALES - GENERAL
PAINLEVEL_OUTOF10: 0 - NO PAIN
PAINLEVEL_OUTOF10: 8

## 2024-07-22 NOTE — PROGRESS NOTES
Transport scheduled for 12pm to [facility], [place]. Left msg for pt's dtr (Hanna).

## 2024-07-22 NOTE — CARE PLAN
The patient's goals for the shift include      The clinical goals for the shift include see care plan      Problem: Pain - Adult  Goal: Verbalizes/displays adequate comfort level or baseline comfort level  7/22/2024 1239 by Erika Sosa RN  Outcome: Met  7/22/2024 1239 by Erika Sosa RN  Outcome: Met     Problem: Safety - Adult  Goal: Free from fall injury  7/22/2024 1239 by Erika Sosa RN  Outcome: Met  7/22/2024 1239 by Erika Sosa RN  Outcome: Met     Problem: Discharge Planning  Goal: Discharge to home or other facility with appropriate resources  7/22/2024 1239 by Erika Sosa RN  Outcome: Met  7/22/2024 1239 by Erika Sosa RN  Outcome: Met     Problem: Chronic Conditions and Co-morbidities  Goal: Patient's chronic conditions and co-morbidity symptoms are monitored and maintained or improved  7/22/2024 1239 by Erika Sosa RN  Outcome: Met  7/22/2024 1239 by Erika Sosa RN  Outcome: Met     Problem: Pain  Goal: Takes deep breaths with improved pain control throughout the shift  7/22/2024 1239 by Erika Sosa RN  Outcome: Met  7/22/2024 1239 by Erika Sosa RN  Outcome: Met     Problem: Skin  Goal: Decreased wound size/increased tissue granulation at next dressing change  7/22/2024 1239 by Erika Sosa RN  Outcome: Met  7/22/2024 1239 by Erika Sosa RN  Outcome: Met

## 2024-07-22 NOTE — NURSING NOTE
1154- attempted to call the [facility] with report. Waited on phone for 20 minutes with no answer - will attempt to call again at different time    1203 - Report called to Kaitlynn at the [facility]. Provided call back number, no further questions      EOS note: pt discharging at this time. All belongings packed up. VSS. Pt remained oriented x4, pain relieved by tylenol. Very pleasant, ate all meals. Sitting up in chair for part of day with alarm on. Family is up to date on plan of care. No further concerns from RN standpoint.

## 2024-07-22 NOTE — NURSING NOTE
Pt had uneventful night with no acute changes in condition. Pt remains A&Ox4 and had no complaints of pain throughout night. Pt safety maintained at all times.

## 2024-07-22 NOTE — CARE PLAN
The patient's goals for the shift include  remaining comfortable throughout shift    The clinical goals for the shift include See POC      Problem: Pain - Adult  Goal: Verbalizes/displays adequate comfort level or baseline comfort level  Outcome: Progressing     Problem: Safety - Adult  Goal: Free from fall injury  Outcome: Progressing     Problem: Discharge Planning  Goal: Discharge to home or other facility with appropriate resources  Outcome: Progressing     Problem: Chronic Conditions and Co-morbidities  Goal: Patient's chronic conditions and co-morbidity symptoms are monitored and maintained or improved  Outcome: Progressing     Problem: Pain  Goal: Takes deep breaths with improved pain control throughout the shift  Outcome: Progressing     Problem: Skin  Goal: Decreased wound size/increased tissue granulation at next dressing change  Outcome: Progressing  Flowsheets (Taken 7/22/2024 0059)  Decreased wound size/increased tissue granulation at next dressing change: Promote sleep for wound healing

## 2024-07-24 ENCOUNTER — TELEPHONE (OUTPATIENT)
Dept: HEMATOLOGY/ONCOLOGY | Facility: HOSPITAL | Age: 89
End: 2024-07-24
Payer: MEDICARE

## 2024-07-24 DIAGNOSIS — R91.1 LUNG NODULE: ICD-10-CM

## 2024-07-24 DIAGNOSIS — N63.10 MASS OF RIGHT BREAST, UNSPECIFIED QUADRANT: Primary | ICD-10-CM

## 2024-07-24 NOTE — TELEPHONE ENCOUNTER
Referral placed to [facility] by Dr. Pineda ([facility]) for nodular opacity x RLL lung and right breast densities concerning for malignancy, discovered on CT angio chest 7/17/24. Advise referral to lung nodule clinic and referral to surgical oncology - breast team, for further evaluation and mgmt of related issues. Both referrals placed. Please call patient to advise her of above and provide lung nodule clinic contact information.  Many thanks,  KELSEA Casarez.CNP  [facility]     Orders Placed This Encounter   Procedures    Referral to Lung Nodule Center    Referral to Surgical Oncology

## 2024-12-29 ENCOUNTER — OFFICE VISIT (OUTPATIENT)
Dept: URGENT CARE | Age: 89
End: 2024-12-29
Payer: MEDICARE

## 2024-12-29 VITALS
OXYGEN SATURATION: 92 % | HEART RATE: 71 BPM | RESPIRATION RATE: 16 BRPM | SYSTOLIC BLOOD PRESSURE: 117 MMHG | TEMPERATURE: 98.8 F | WEIGHT: 120 LBS | DIASTOLIC BLOOD PRESSURE: 72 MMHG | BODY MASS INDEX: 18.25 KG/M2

## 2024-12-29 DIAGNOSIS — R05.9 COUGH, UNSPECIFIED TYPE: ICD-10-CM

## 2024-12-29 DIAGNOSIS — J01.90 ACUTE SINUSITIS, RECURRENCE NOT SPECIFIED, UNSPECIFIED LOCATION: Primary | ICD-10-CM

## 2024-12-29 LAB
POC RAPID INFLUENZA A: NEGATIVE
POC RAPID INFLUENZA B: NEGATIVE
POC SARS-COV-2 AG BINAX: NORMAL

## 2024-12-29 PROCEDURE — 99203 OFFICE O/P NEW LOW 30 MIN: CPT | Performed by: FAMILY MEDICINE

## 2024-12-29 PROCEDURE — 87811 SARS-COV-2 COVID19 W/OPTIC: CPT | Performed by: FAMILY MEDICINE

## 2024-12-29 PROCEDURE — 1157F ADVNC CARE PLAN IN RCRD: CPT | Performed by: FAMILY MEDICINE

## 2024-12-29 PROCEDURE — 3074F SYST BP LT 130 MM HG: CPT | Performed by: FAMILY MEDICINE

## 2024-12-29 PROCEDURE — 87804 INFLUENZA ASSAY W/OPTIC: CPT | Performed by: FAMILY MEDICINE

## 2024-12-29 PROCEDURE — 3078F DIAST BP <80 MM HG: CPT | Performed by: FAMILY MEDICINE

## 2024-12-29 PROCEDURE — 1159F MED LIST DOCD IN RCRD: CPT | Performed by: FAMILY MEDICINE

## 2024-12-29 PROCEDURE — 1036F TOBACCO NON-USER: CPT | Performed by: FAMILY MEDICINE

## 2024-12-29 RX ORDER — BENZONATATE 200 MG/1
200 CAPSULE ORAL 3 TIMES DAILY PRN
Qty: 42 CAPSULE | Refills: 0 | Status: SHIPPED | OUTPATIENT
Start: 2024-12-29 | End: 2025-01-28

## 2024-12-29 RX ORDER — AMOXICILLIN 875 MG/1
875 TABLET, FILM COATED ORAL 2 TIMES DAILY
Qty: 20 TABLET | Refills: 0 | Status: SHIPPED | OUTPATIENT
Start: 2024-12-29 | End: 2025-01-08

## 2024-12-29 NOTE — PATIENT INSTRUCTIONS
Medication as directed  Increase fluids and rest  Continue OTC Robitussin  Motrin or Tylenol as needed for pain or fever  Gargle with lightly salted warm water several times a day

## 2024-12-29 NOTE — PROGRESS NOTES
Subjective   Patient ID: Noemí Hinton is a 89 y.o. female. They present today with a chief complaint of Cough and Nasal Congestion.    History of Present Illness  HPI  Days of cough, congestion, postnasal drip runny nose. No fevers have been noted. Patient denies shortness of breath, chest pain, or wheezing. No red eyes, eye pain, or discharge. They deny nausea vomiting or diarrhea. No known exposures to strep mono influenza, COVID-19 or pneumonia. No skin rashes are noted. Over-the-counter medications are offering minimal relief from symptoms.  Daughter states patient was diagnosed with pneumonia over the summer, was placed on 4 L of O2 nasal cannula oxygen at that time and has been unable to discontinue the oxygen since then.      Past Medical History  Allergies as of 2024 - Reviewed 2024   Allergen Reaction Noted    Codeine Hives, Dizziness, GI intolerance, Headache, Other, and Shortness of breath 07/10/2013    Hydrocodone-acetaminophen GI intolerance, GI Upset, and Unknown 07/10/2013    Tramadol GI bleeding, Confusion, GI intolerance, and Unknown 2019    Hydrocodone Hives 2023       (Not in a hospital admission)       Past Medical History:   Diagnosis Date    Anxiety 2023    Arthritis     Atrial fibrillation (Multi) 2023    Breast mass, right 2019    Coronary artery disease due to lipid rich plaque 2023    GERD (gastroesophageal reflux disease) 2023    Hip fracture, left 2023    History of femur fracture     Left    HLD (hyperlipidemia)     Incontinence of urine     Myocardial infarction (Multi)     Neuropathy 2023    Sciatica        Past Surgical History:   Procedure Laterality Date     SECTION, CLASSIC      x 2    CORONARY ANGIOPLASTY WITH STENT PLACEMENT      4 stents in  and 2 stents in     HIP FRACTURE SURGERY Left     ORIF FEMUR FRACTURE Left         reports that she has never smoked. She has never used smokeless tobacco. She  reports that she does not drink alcohol and does not use drugs.    Review of Systems  Review of Systems     As in history of present illness                          Objective    Vitals:    12/29/24 1221   BP: 117/72   Pulse: 71   Resp: 16   Temp: 37.1 °C (98.8 °F)   SpO2: 92%   Weight: 54.4 kg (120 lb)     No LMP recorded (lmp unknown). Patient is postmenopausal.    Physical Exam  Gen.-alert cooperative and in no apparent distress  Head and face- no swelling redness, tenderness or rash  Eyes-EOMI, no redness or discharge is noted  ENT- bilateral nasal congestion with clear rhinorrhea and postnasal drip in oral pharynx  Neck- normal range of motion with no lymphadenopathy or mass.   Pulmonary- no respiratory distress noted. Lungs clear to auscultation without wheezes rhonchi or rales  Skin- no rashes discoloration or skin lesions noted  Lymphatic-- no lymph node swelling or tenderness appreciated  Procedures    Point of Care Test & Imaging Results from this visit  Results for orders placed or performed in visit on 12/29/24   POCT Covid-19 Rapid Antigen   Result Value Ref Range    POC JEOVANY-COV-2 AG  Presumptive negative test for SARS-CoV-2 (no antigen detected)     Presumptive negative test for SARS-CoV-2 (no antigen detected)   POCT Influenza A/B manually resulted   Result Value Ref Range    POC Rapid Influenza A Negative Negative    POC Rapid Influenza B Negative Negative      No results found.    Diagnostic study results (if any) were reviewed by Thom Christine MD.    Assessment/Plan   Allergies, medications, history, and pertinent labs/EKGs/Imaging reviewed by Thom Christine MD.     Medical Decision Making  At time of discharge patient was clinically well-appearing and HDS for outpatient management. The patient and/or family was educated regarding diagnosis, supportive care, OTC and Rx medications. The patient and/or family was given the opportunity to ask questions prior to discharge.  They verbalized understanding  of my discussion of the plans for treatment, expected course, indications to return to  or seek further evaluation in ED, and the need for timely follow up as directed.   They were provided with a work/school excuse if requested.    Orders and Diagnoses  Diagnoses and all orders for this visit:  Acute sinusitis, recurrence not specified, unspecified location  -     amoxicillin (Amoxil) 875 mg tablet; Take 1 tablet (875 mg) by mouth 2 times a day for 10 days.  Cough, unspecified type  -     POCT Covid-19 Rapid Antigen  -     POCT Influenza A/B manually resulted  -     benzonatate (Tessalon) 200 mg capsule; Take 1 capsule (200 mg) by mouth 3 times a day as needed for cough. Do not crush or chew.      Medical Admin Record      Patient disposition: Home    Electronically signed by Thom Christine MD  12:56 PM

## 2025-02-21 ENCOUNTER — APPOINTMENT (OUTPATIENT)
Dept: RADIOLOGY | Facility: HOSPITAL | Age: OVER 89
DRG: 543 | End: 2025-02-21
Payer: MEDICARE

## 2025-02-21 ENCOUNTER — HOSPITAL ENCOUNTER (INPATIENT)
Facility: HOSPITAL | Age: OVER 89
Discharge: NURSING, REHAB OR LONG-TERM CARE | DRG: 543 | End: 2025-02-21
Attending: STUDENT IN AN ORGANIZED HEALTH CARE EDUCATION/TRAINING PROGRAM | Admitting: INTERNAL MEDICINE
Payer: MEDICARE

## 2025-02-21 ENCOUNTER — APPOINTMENT (OUTPATIENT)
Dept: CARDIOLOGY | Facility: HOSPITAL | Age: OVER 89
DRG: 543 | End: 2025-02-21
Payer: MEDICARE

## 2025-02-21 DIAGNOSIS — R30.0 DYSURIA: ICD-10-CM

## 2025-02-21 DIAGNOSIS — N39.0 URINARY TRACT INFECTION WITHOUT HEMATURIA, SITE UNSPECIFIED: ICD-10-CM

## 2025-02-21 DIAGNOSIS — S72.431A DISPLACED FRACTURE OF MEDIAL CONDYLE OF RIGHT FEMUR, INITIAL ENCOUNTER FOR CLOSED FRACTURE: Primary | ICD-10-CM

## 2025-02-21 DIAGNOSIS — J18.9 PNEUMONIA DUE TO INFECTIOUS ORGANISM, UNSPECIFIED LATERALITY, UNSPECIFIED PART OF LUNG: ICD-10-CM

## 2025-02-21 PROBLEM — M25.461 EFFUSION OF RIGHT KNEE: Status: ACTIVE | Noted: 2025-02-21

## 2025-02-21 LAB
ABO GROUP (TYPE) IN BLOOD: NORMAL
ALBUMIN SERPL BCP-MCNC: 3.8 G/DL (ref 3.4–5)
ALP SERPL-CCNC: 55 U/L (ref 33–136)
ALT SERPL W P-5'-P-CCNC: 9 U/L (ref 7–45)
ANION GAP SERPL CALC-SCNC: 13 MMOL/L (ref 10–20)
ANTIBODY SCREEN: NORMAL
APTT PPP: 32 SECONDS (ref 27–38)
AST SERPL W P-5'-P-CCNC: 15 U/L (ref 9–39)
ATRIAL RATE: 56 BPM
BILIRUB SERPL-MCNC: 1.1 MG/DL (ref 0–1.2)
BUN SERPL-MCNC: 24 MG/DL (ref 6–23)
CALCIUM SERPL-MCNC: 9.7 MG/DL (ref 8.6–10.3)
CHLORIDE SERPL-SCNC: 111 MMOL/L (ref 98–107)
CO2 SERPL-SCNC: 24 MMOL/L (ref 21–32)
CREAT SERPL-MCNC: 0.85 MG/DL (ref 0.5–1.05)
EGFRCR SERPLBLD CKD-EPI 2021: 63 ML/MIN/1.73M*2
ERYTHROCYTE [DISTWIDTH] IN BLOOD BY AUTOMATED COUNT: 13.5 % (ref 11.5–14.5)
GLUCOSE SERPL-MCNC: 130 MG/DL (ref 74–99)
HCT VFR BLD AUTO: 43.5 % (ref 36–46)
HGB BLD-MCNC: 13.9 G/DL (ref 12–16)
HOLD SPECIMEN: NORMAL
INR PPP: 1.7 (ref 0.9–1.1)
MAGNESIUM SERPL-MCNC: 1.9 MG/DL (ref 1.6–2.4)
MCH RBC QN AUTO: 30.3 PG (ref 26–34)
MCHC RBC AUTO-ENTMCNC: 32 G/DL (ref 32–36)
MCV RBC AUTO: 95 FL (ref 80–100)
NRBC BLD-RTO: 0 /100 WBCS (ref 0–0)
P OFFSET: 186 MS
P ONSET: 137 MS
PLATELET # BLD AUTO: 185 X10*3/UL (ref 150–450)
POTASSIUM SERPL-SCNC: 4.1 MMOL/L (ref 3.5–5.3)
PR INTERVAL: 158 MS
PROT SERPL-MCNC: 6.5 G/DL (ref 6.4–8.2)
PROTHROMBIN TIME: 18.9 SECONDS (ref 9.8–12.8)
Q ONSET: 216 MS
QRS COUNT: 9 BEATS
QRS DURATION: 80 MS
QT INTERVAL: 450 MS
QTC CALCULATION(BAZETT): 434 MS
QTC FREDERICIA: 440 MS
R AXIS: 183 DEGREES
RBC # BLD AUTO: 4.59 X10*6/UL (ref 4–5.2)
RH FACTOR (ANTIGEN D): NORMAL
SODIUM SERPL-SCNC: 144 MMOL/L (ref 136–145)
T AXIS: 149 DEGREES
T OFFSET: 441 MS
VENTRICULAR RATE: 56 BPM
WBC # BLD AUTO: 10 X10*3/UL (ref 4.4–11.3)

## 2025-02-21 PROCEDURE — 93005 ELECTROCARDIOGRAM TRACING: CPT

## 2025-02-21 PROCEDURE — 73564 X-RAY EXAM KNEE 4 OR MORE: CPT | Mod: RT

## 2025-02-21 PROCEDURE — 36415 COLL VENOUS BLD VENIPUNCTURE: CPT

## 2025-02-21 PROCEDURE — 2500000004 HC RX 250 GENERAL PHARMACY W/ HCPCS (ALT 636 FOR OP/ED)

## 2025-02-21 PROCEDURE — 86850 RBC ANTIBODY SCREEN: CPT

## 2025-02-21 PROCEDURE — 73700 CT LOWER EXTREMITY W/O DYE: CPT | Mod: RT

## 2025-02-21 PROCEDURE — 2500000001 HC RX 250 WO HCPCS SELF ADMINISTERED DRUGS (ALT 637 FOR MEDICARE OP): Performed by: STUDENT IN AN ORGANIZED HEALTH CARE EDUCATION/TRAINING PROGRAM

## 2025-02-21 PROCEDURE — 73610 X-RAY EXAM OF ANKLE: CPT | Mod: RT

## 2025-02-21 PROCEDURE — 99232 SBSQ HOSP IP/OBS MODERATE 35: CPT

## 2025-02-21 PROCEDURE — 73610 X-RAY EXAM OF ANKLE: CPT | Mod: RIGHT SIDE | Performed by: STUDENT IN AN ORGANIZED HEALTH CARE EDUCATION/TRAINING PROGRAM

## 2025-02-21 PROCEDURE — 85730 THROMBOPLASTIN TIME PARTIAL: CPT

## 2025-02-21 PROCEDURE — 1200000002 HC GENERAL ROOM WITH TELEMETRY DAILY

## 2025-02-21 PROCEDURE — 73564 X-RAY EXAM KNEE 4 OR MORE: CPT | Mod: RIGHT SIDE | Performed by: STUDENT IN AN ORGANIZED HEALTH CARE EDUCATION/TRAINING PROGRAM

## 2025-02-21 PROCEDURE — 82565 ASSAY OF CREATININE: CPT

## 2025-02-21 PROCEDURE — 85027 COMPLETE CBC AUTOMATED: CPT

## 2025-02-21 PROCEDURE — 27508 TREATMENT OF THIGH FRACTURE: CPT | Performed by: ORTHOPAEDIC SURGERY

## 2025-02-21 PROCEDURE — 99285 EMERGENCY DEPT VISIT HI MDM: CPT | Performed by: STUDENT IN AN ORGANIZED HEALTH CARE EDUCATION/TRAINING PROGRAM

## 2025-02-21 PROCEDURE — 99221 1ST HOSP IP/OBS SF/LOW 40: CPT | Performed by: ORTHOPAEDIC SURGERY

## 2025-02-21 PROCEDURE — 2500000001 HC RX 250 WO HCPCS SELF ADMINISTERED DRUGS (ALT 637 FOR MEDICARE OP): Performed by: NURSE PRACTITIONER

## 2025-02-21 PROCEDURE — 2500000001 HC RX 250 WO HCPCS SELF ADMINISTERED DRUGS (ALT 637 FOR MEDICARE OP)

## 2025-02-21 PROCEDURE — 83735 ASSAY OF MAGNESIUM: CPT

## 2025-02-21 RX ORDER — ACETAMINOPHEN 325 MG/1
325 TABLET ORAL EVERY 6 HOURS PRN
COMMUNITY

## 2025-02-21 RX ORDER — ACETAMINOPHEN 325 MG/1
650 TABLET ORAL ONCE
Status: COMPLETED | OUTPATIENT
Start: 2025-02-21 | End: 2025-02-21

## 2025-02-21 RX ORDER — SODIUM CHLORIDE, SODIUM LACTATE, POTASSIUM CHLORIDE, CALCIUM CHLORIDE 600; 310; 30; 20 MG/100ML; MG/100ML; MG/100ML; MG/100ML
50 INJECTION, SOLUTION INTRAVENOUS CONTINUOUS
Status: ACTIVE | OUTPATIENT
Start: 2025-02-21 | End: 2025-02-22

## 2025-02-21 RX ORDER — MORPHINE SULFATE 2 MG/ML
2 INJECTION, SOLUTION INTRAMUSCULAR; INTRAVENOUS ONCE
Status: DISCONTINUED | OUTPATIENT
Start: 2025-02-21 | End: 2025-02-21

## 2025-02-21 RX ORDER — POLYETHYLENE GLYCOL 3350 17 G/17G
17 POWDER, FOR SOLUTION ORAL DAILY PRN
Status: DISCONTINUED | OUTPATIENT
Start: 2025-02-21 | End: 2025-02-24 | Stop reason: HOSPADM

## 2025-02-21 RX ORDER — DOCUSATE SODIUM 100 MG/1
100 CAPSULE, LIQUID FILLED ORAL 2 TIMES DAILY
Status: DISCONTINUED | OUTPATIENT
Start: 2025-02-21 | End: 2025-02-24 | Stop reason: HOSPADM

## 2025-02-21 RX ORDER — TALC
3 POWDER (GRAM) TOPICAL NIGHTLY PRN
Status: DISCONTINUED | OUTPATIENT
Start: 2025-02-21 | End: 2025-02-24 | Stop reason: HOSPADM

## 2025-02-21 RX ORDER — PANTOPRAZOLE SODIUM 40 MG/10ML
40 INJECTION, POWDER, LYOPHILIZED, FOR SOLUTION INTRAVENOUS
Status: DISCONTINUED | OUTPATIENT
Start: 2025-02-21 | End: 2025-02-24 | Stop reason: HOSPADM

## 2025-02-21 RX ORDER — PANTOPRAZOLE SODIUM 40 MG/1
40 TABLET, DELAYED RELEASE ORAL
Status: DISCONTINUED | OUTPATIENT
Start: 2025-02-21 | End: 2025-02-24 | Stop reason: HOSPADM

## 2025-02-21 RX ORDER — ACETAMINOPHEN 325 MG/1
650 TABLET ORAL EVERY 6 HOURS PRN
Status: DISCONTINUED | OUTPATIENT
Start: 2025-02-21 | End: 2025-02-24 | Stop reason: HOSPADM

## 2025-02-21 RX ORDER — TRIAMCINOLONE ACETONIDE 0.25 MG/G
1 CREAM TOPICAL 2 TIMES DAILY PRN
COMMUNITY

## 2025-02-21 RX ORDER — OXYCODONE HYDROCHLORIDE 5 MG/1
2.5 TABLET ORAL EVERY 4 HOURS PRN
Status: DISCONTINUED | OUTPATIENT
Start: 2025-02-21 | End: 2025-02-24 | Stop reason: HOSPADM

## 2025-02-21 RX ORDER — OXYCODONE HYDROCHLORIDE 5 MG/1
5 TABLET ORAL EVERY 4 HOURS PRN
Status: DISCONTINUED | OUTPATIENT
Start: 2025-02-21 | End: 2025-02-24 | Stop reason: HOSPADM

## 2025-02-21 RX ORDER — ATENOLOL 25 MG/1
25 TABLET ORAL NIGHTLY
Status: DISCONTINUED | OUTPATIENT
Start: 2025-02-21 | End: 2025-02-24 | Stop reason: HOSPADM

## 2025-02-21 RX ORDER — IBUPROFEN 400 MG/1
400 TABLET ORAL ONCE
Status: COMPLETED | OUTPATIENT
Start: 2025-02-21 | End: 2025-02-21

## 2025-02-21 RX ADMIN — OXYCODONE HYDROCHLORIDE 2.5 MG: 5 TABLET ORAL at 23:33

## 2025-02-21 RX ADMIN — APIXABAN 5 MG: 5 TABLET, FILM COATED ORAL at 20:25

## 2025-02-21 RX ADMIN — ACETAMINOPHEN 650 MG: 325 TABLET ORAL at 20:24

## 2025-02-21 RX ADMIN — SODIUM CHLORIDE, POTASSIUM CHLORIDE, SODIUM LACTATE AND CALCIUM CHLORIDE 50 ML/HR: 600; 310; 30; 20 INJECTION, SOLUTION INTRAVENOUS at 06:28

## 2025-02-21 RX ADMIN — DOCUSATE SODIUM 100 MG: 100 CAPSULE, LIQUID FILLED ORAL at 20:25

## 2025-02-21 RX ADMIN — PANTOPRAZOLE SODIUM 40 MG: 40 INJECTION, POWDER, FOR SOLUTION INTRAVENOUS at 06:28

## 2025-02-21 RX ADMIN — ACETAMINOPHEN 650 MG: 325 TABLET ORAL at 03:45

## 2025-02-21 RX ADMIN — OXYCODONE HYDROCHLORIDE 5 MG: 5 TABLET ORAL at 04:56

## 2025-02-21 RX ADMIN — IBUPROFEN 400 MG: 400 TABLET, FILM COATED ORAL at 03:45

## 2025-02-21 RX ADMIN — ATENOLOL 25 MG: 25 TABLET ORAL at 20:25

## 2025-02-21 SDOH — HEALTH STABILITY: MENTAL HEALTH
DO YOU FEEL STRESS - TENSE, RESTLESS, NERVOUS, OR ANXIOUS, OR UNABLE TO SLEEP AT NIGHT BECAUSE YOUR MIND IS TROUBLED ALL THE TIME - THESE DAYS?: NOT AT ALL

## 2025-02-21 SDOH — ECONOMIC STABILITY: HOUSING INSECURITY: AT ANY TIME IN THE PAST 12 MONTHS, WERE YOU HOMELESS OR LIVING IN A SHELTER (INCLUDING NOW)?: NO

## 2025-02-21 SDOH — HEALTH STABILITY: PHYSICAL HEALTH
HOW OFTEN DO YOU NEED TO HAVE SOMEONE HELP YOU WHEN YOU READ INSTRUCTIONS, PAMPHLETS, OR OTHER WRITTEN MATERIAL FROM YOUR DOCTOR OR PHARMACY?: NEVER

## 2025-02-21 SDOH — SOCIAL STABILITY: SOCIAL INSECURITY: DOES ANYONE TRY TO KEEP YOU FROM HAVING/CONTACTING OTHER FRIENDS OR DOING THINGS OUTSIDE YOUR HOME?: NO

## 2025-02-21 SDOH — HEALTH STABILITY: PHYSICAL HEALTH: ON AVERAGE, HOW MANY MINUTES DO YOU ENGAGE IN EXERCISE AT THIS LEVEL?: 30 MIN

## 2025-02-21 SDOH — SOCIAL STABILITY: SOCIAL INSECURITY: WITHIN THE LAST YEAR, HAVE YOU BEEN HUMILIATED OR EMOTIONALLY ABUSED IN OTHER WAYS BY YOUR PARTNER OR EX-PARTNER?: NO

## 2025-02-21 SDOH — SOCIAL STABILITY: SOCIAL INSECURITY: WITHIN THE LAST YEAR, HAVE YOU BEEN AFRAID OF YOUR PARTNER OR EX-PARTNER?: NO

## 2025-02-21 SDOH — ECONOMIC STABILITY: INCOME INSECURITY: IN THE PAST 12 MONTHS HAS THE ELECTRIC, GAS, OIL, OR WATER COMPANY THREATENED TO SHUT OFF SERVICES IN YOUR HOME?: NO

## 2025-02-21 SDOH — ECONOMIC STABILITY: FOOD INSECURITY: WITHIN THE PAST 12 MONTHS, YOU WORRIED THAT YOUR FOOD WOULD RUN OUT BEFORE YOU GOT THE MONEY TO BUY MORE.: NEVER TRUE

## 2025-02-21 SDOH — SOCIAL STABILITY: SOCIAL NETWORK
DO YOU BELONG TO ANY CLUBS OR ORGANIZATIONS SUCH AS CHURCH GROUPS, UNIONS, FRATERNAL OR ATHLETIC GROUPS, OR SCHOOL GROUPS?: NO

## 2025-02-21 SDOH — SOCIAL STABILITY: SOCIAL INSECURITY: HAVE YOU HAD THOUGHTS OF HARMING ANYONE ELSE?: YES

## 2025-02-21 SDOH — SOCIAL STABILITY: SOCIAL INSECURITY
WITHIN THE LAST YEAR, HAVE YOU BEEN KICKED, HIT, SLAPPED, OR OTHERWISE PHYSICALLY HURT BY YOUR PARTNER OR EX-PARTNER?: NO

## 2025-02-21 SDOH — SOCIAL STABILITY: SOCIAL INSECURITY: ARE YOU MARRIED, WIDOWED, DIVORCED, SEPARATED, NEVER MARRIED, OR LIVING WITH A PARTNER?: WIDOWED

## 2025-02-21 SDOH — SOCIAL STABILITY: SOCIAL INSECURITY: ARE THERE ANY APPARENT SIGNS OF INJURIES/BEHAVIORS THAT COULD BE RELATED TO ABUSE/NEGLECT?: NO

## 2025-02-21 SDOH — ECONOMIC STABILITY: FOOD INSECURITY: WITHIN THE PAST 12 MONTHS, THE FOOD YOU BOUGHT JUST DIDN'T LAST AND YOU DIDN'T HAVE MONEY TO GET MORE.: NEVER TRUE

## 2025-02-21 SDOH — ECONOMIC STABILITY: TRANSPORTATION INSECURITY: IN THE PAST 12 MONTHS, HAS LACK OF TRANSPORTATION KEPT YOU FROM MEDICAL APPOINTMENTS OR FROM GETTING MEDICATIONS?: NO

## 2025-02-21 SDOH — SOCIAL STABILITY: SOCIAL INSECURITY: DO YOU FEEL UNSAFE GOING BACK TO THE PLACE WHERE YOU ARE LIVING?: NO

## 2025-02-21 SDOH — SOCIAL STABILITY: SOCIAL NETWORK
IN A TYPICAL WEEK, HOW MANY TIMES DO YOU TALK ON THE PHONE WITH FAMILY, FRIENDS, OR NEIGHBORS?: MORE THAN THREE TIMES A WEEK

## 2025-02-21 SDOH — SOCIAL STABILITY: SOCIAL NETWORK: HOW OFTEN DO YOU GET TOGETHER WITH FRIENDS OR RELATIVES?: MORE THAN THREE TIMES A WEEK

## 2025-02-21 SDOH — SOCIAL STABILITY: SOCIAL INSECURITY: HAVE YOU HAD ANY THOUGHTS OF HARMING ANYONE ELSE?: NO

## 2025-02-21 SDOH — SOCIAL STABILITY: SOCIAL INSECURITY: ARE YOU OR HAVE YOU BEEN THREATENED OR ABUSED PHYSICALLY, EMOTIONALLY, OR SEXUALLY BY ANYONE?: NO

## 2025-02-21 SDOH — HEALTH STABILITY: PHYSICAL HEALTH: ON AVERAGE, HOW MANY DAYS PER WEEK DO YOU ENGAGE IN MODERATE TO STRENUOUS EXERCISE (LIKE A BRISK WALK)?: 2 DAYS

## 2025-02-21 SDOH — ECONOMIC STABILITY: HOUSING INSECURITY: IN THE LAST 12 MONTHS, WAS THERE A TIME WHEN YOU WERE NOT ABLE TO PAY THE MORTGAGE OR RENT ON TIME?: NO

## 2025-02-21 SDOH — SOCIAL STABILITY: SOCIAL INSECURITY
WITHIN THE LAST YEAR, HAVE YOU BEEN RAPED OR FORCED TO HAVE ANY KIND OF SEXUAL ACTIVITY BY YOUR PARTNER OR EX-PARTNER?: NO

## 2025-02-21 SDOH — SOCIAL STABILITY: SOCIAL NETWORK: HOW OFTEN DO YOU ATTEND CHURCH OR RELIGIOUS SERVICES?: NEVER

## 2025-02-21 SDOH — SOCIAL STABILITY: SOCIAL INSECURITY: ABUSE: ADULT

## 2025-02-21 SDOH — SOCIAL STABILITY: SOCIAL INSECURITY: WERE YOU ABLE TO COMPLETE ALL THE BEHAVIORAL HEALTH SCREENINGS?: YES

## 2025-02-21 SDOH — ECONOMIC STABILITY: FOOD INSECURITY: HOW HARD IS IT FOR YOU TO PAY FOR THE VERY BASICS LIKE FOOD, HOUSING, MEDICAL CARE, AND HEATING?: NOT HARD AT ALL

## 2025-02-21 SDOH — ECONOMIC STABILITY: HOUSING INSECURITY: IN THE PAST 12 MONTHS, HOW MANY TIMES HAVE YOU MOVED WHERE YOU WERE LIVING?: 1

## 2025-02-21 SDOH — SOCIAL STABILITY: SOCIAL NETWORK: HOW OFTEN DO YOU ATTEND MEETINGS OF THE CLUBS OR ORGANIZATIONS YOU BELONG TO?: NEVER

## 2025-02-21 SDOH — SOCIAL STABILITY: SOCIAL INSECURITY: HAS ANYONE EVER THREATENED TO HURT YOUR FAMILY OR YOUR PETS?: NO

## 2025-02-21 SDOH — SOCIAL STABILITY: SOCIAL INSECURITY: DO YOU FEEL ANYONE HAS EXPLOITED OR TAKEN ADVANTAGE OF YOU FINANCIALLY OR OF YOUR PERSONAL PROPERTY?: NO

## 2025-02-21 ASSESSMENT — PAIN DESCRIPTION - LOCATION
LOCATION: KNEE
LOCATION: FOOT

## 2025-02-21 ASSESSMENT — COLUMBIA-SUICIDE SEVERITY RATING SCALE - C-SSRS
2. HAVE YOU ACTUALLY HAD ANY THOUGHTS OF KILLING YOURSELF?: NO
1. IN THE PAST MONTH, HAVE YOU WISHED YOU WERE DEAD OR WISHED YOU COULD GO TO SLEEP AND NOT WAKE UP?: NO
1. IN THE PAST MONTH, HAVE YOU WISHED YOU WERE DEAD OR WISHED YOU COULD GO TO SLEEP AND NOT WAKE UP?: NO
6. HAVE YOU EVER DONE ANYTHING, STARTED TO DO ANYTHING, OR PREPARED TO DO ANYTHING TO END YOUR LIFE?: NO
6. HAVE YOU EVER DONE ANYTHING, STARTED TO DO ANYTHING, OR PREPARED TO DO ANYTHING TO END YOUR LIFE?: NO
2. HAVE YOU ACTUALLY HAD ANY THOUGHTS OF KILLING YOURSELF?: NO

## 2025-02-21 ASSESSMENT — PAIN DESCRIPTION - DESCRIPTORS: DESCRIPTORS: ACHING

## 2025-02-21 ASSESSMENT — ENCOUNTER SYMPTOMS
BACK PAIN: 0
FEVER: 0
ARTHRALGIAS: 1
ABDOMINAL DISTENTION: 0
SORE THROAT: 0
WOUND: 0
DIZZINESS: 0
FLANK PAIN: 0
COUGH: 0
DYSPHORIC MOOD: 0
CONSTIPATION: 0
AGITATION: 0
PALPITATIONS: 0
NERVOUS/ANXIOUS: 0
FREQUENCY: 0
HEMATURIA: 0
DYSURIA: 0
CONFUSION: 0
ABDOMINAL PAIN: 0
JOINT SWELLING: 1
CHILLS: 0
FATIGUE: 0
CHEST TIGHTNESS: 0
DIARRHEA: 0
COLOR CHANGE: 0
WEAKNESS: 1
SHORTNESS OF BREATH: 0
TROUBLE SWALLOWING: 0
LIGHT-HEADEDNESS: 0

## 2025-02-21 ASSESSMENT — COGNITIVE AND FUNCTIONAL STATUS - GENERAL
TURNING FROM BACK TO SIDE WHILE IN FLAT BAD: A LOT
TOILETING: A LOT
TOILETING: A LOT
WALKING IN HOSPITAL ROOM: A LOT
MOVING FROM LYING ON BACK TO SITTING ON SIDE OF FLAT BED WITH BEDRAILS: A LOT
HELP NEEDED FOR BATHING: A LOT
MOVING FROM LYING ON BACK TO SITTING ON SIDE OF FLAT BED WITH BEDRAILS: A LOT
DRESSING REGULAR UPPER BODY CLOTHING: A LOT
STANDING UP FROM CHAIR USING ARMS: A LOT
DRESSING REGULAR LOWER BODY CLOTHING: A LOT
DAILY ACTIVITIY SCORE: 14
STANDING UP FROM CHAIR USING ARMS: A LOT
WALKING IN HOSPITAL ROOM: A LOT
PATIENT BASELINE BEDBOUND: NO
DRESSING REGULAR UPPER BODY CLOTHING: A LOT
MOBILITY SCORE: 12
MOVING TO AND FROM BED TO CHAIR: A LOT
PERSONAL GROOMING: A LOT
MOVING TO AND FROM BED TO CHAIR: A LOT
DRESSING REGULAR LOWER BODY CLOTHING: A LOT
TURNING FROM BACK TO SIDE WHILE IN FLAT BAD: A LOT
HELP NEEDED FOR BATHING: A LOT
EATING MEALS: A LITTLE
EATING MEALS: A LITTLE
CLIMB 3 TO 5 STEPS WITH RAILING: A LOT
CLIMB 3 TO 5 STEPS WITH RAILING: A LOT
PERSONAL GROOMING: A LITTLE

## 2025-02-21 ASSESSMENT — PAIN SCALES - GENERAL
PAINLEVEL_OUTOF10: 0 - NO PAIN
PAINLEVEL_OUTOF10: 4
PAINLEVEL_OUTOF10: 8
PAINLEVEL_OUTOF10: 6
PAINLEVEL_OUTOF10: 8
PAINLEVEL_OUTOF10: 5 - MODERATE PAIN
PAINLEVEL_OUTOF10: 3
PAINLEVEL_OUTOF10: 0 - NO PAIN
PAINLEVEL_OUTOF10: 3

## 2025-02-21 ASSESSMENT — LIFESTYLE VARIABLES
HOW OFTEN DO YOU HAVE A DRINK CONTAINING ALCOHOL: NEVER
EVER FELT BAD OR GUILTY ABOUT YOUR DRINKING: NO
PRESCIPTION_ABUSE_PAST_12_MONTHS: NO
EVER HAD A DRINK FIRST THING IN THE MORNING TO STEADY YOUR NERVES TO GET RID OF A HANGOVER: NO
HOW OFTEN DO YOU HAVE 6 OR MORE DRINKS ON ONE OCCASION: NEVER
AUDIT-C TOTAL SCORE: 0
HOW MANY STANDARD DRINKS CONTAINING ALCOHOL DO YOU HAVE ON A TYPICAL DAY: PATIENT DOES NOT DRINK
AUDIT-C TOTAL SCORE: 0
SKIP TO QUESTIONS 9-10: 1
HAVE PEOPLE ANNOYED YOU BY CRITICIZING YOUR DRINKING: NO
HAVE YOU EVER FELT YOU SHOULD CUT DOWN ON YOUR DRINKING: NO
TOTAL SCORE: 0
SUBSTANCE_ABUSE_PAST_12_MONTHS: NO

## 2025-02-21 ASSESSMENT — ACTIVITIES OF DAILY LIVING (ADL)
HEARING - RIGHT EAR: HEARING AID
FEEDING YOURSELF: NEEDS ASSISTANCE
JUDGMENT_ADEQUATE_SAFELY_COMPLETE_DAILY_ACTIVITIES: YES
HEARING - LEFT EAR: HEARING AID
PATIENT'S MEMORY ADEQUATE TO SAFELY COMPLETE DAILY ACTIVITIES?: YES
ASSISTIVE_DEVICE: WALKER;CANE
ADEQUATE_TO_COMPLETE_ADL: YES
GROOMING: NEEDS ASSISTANCE
DRESSING YOURSELF: NEEDS ASSISTANCE
LACK_OF_TRANSPORTATION: NO
TOILETING: NEEDS ASSISTANCE
WALKS IN HOME: NEEDS ASSISTANCE
BATHING: NEEDS ASSISTANCE

## 2025-02-21 ASSESSMENT — PAIN DESCRIPTION - ORIENTATION: ORIENTATION: RIGHT;LOWER

## 2025-02-21 ASSESSMENT — PATIENT HEALTH QUESTIONNAIRE - PHQ9
1. LITTLE INTEREST OR PLEASURE IN DOING THINGS: NOT AT ALL
2. FEELING DOWN, DEPRESSED OR HOPELESS: NOT AT ALL
SUM OF ALL RESPONSES TO PHQ9 QUESTIONS 1 & 2: 0

## 2025-02-21 ASSESSMENT — PAIN - FUNCTIONAL ASSESSMENT
PAIN_FUNCTIONAL_ASSESSMENT: 0-10

## 2025-02-21 ASSESSMENT — VISUAL ACUITY: OU: 1

## 2025-02-21 NOTE — CONSULTS
Orthopaedic Surgery Consultation    Patient is a 89-year-old female history of some coronary artery disease with prior stent placement also has a history of atrial fibrillation who is on Eliquis.  She was going up the stairs her daughter was helping her and she had severe pain and difficulty ambulating was noted to have a nondisplaced fracture of her medial femoral condyle and secondary hemarthrosis.  She was admitted to medicine based on difficulty ambulating and we were consulted regarding her knee.    She has a history of a complex fracture of the contralateral leg and does have a leg length discrepancy.    She lives with her daughter.      Past Medical History:   Diagnosis Date    Anxiety 2023    Arthritis     Atrial fibrillation (Multi) 2023    Breast mass, right 2019    Coronary artery disease due to lipid rich plaque 2023    GERD (gastroesophageal reflux disease) 2023    Hip fracture, left 2023    History of femur fracture     Left    HLD (hyperlipidemia)     Hypertension     Incontinence of urine     Myocardial infarction (Multi)     Neuropathy 2023    Sciatica       Past Surgical History:   Procedure Laterality Date     SECTION, CLASSIC      x 2    CORONARY ANGIOPLASTY WITH STENT PLACEMENT      4 stents in  and 2 stents in     HIP FRACTURE SURGERY Left     ORIF FEMUR FRACTURE Left       Allergies   Allergen Reactions    Codeine Hives, Dizziness, GI intolerance, Headache, Other and Shortness of breath     Dizzy, diaphoresis    Hydrocodone-Acetaminophen GI intolerance, GI Upset and Unknown     sts dizziness    Tramadol GI bleeding, Confusion, GI intolerance and Unknown    Morphine Hallucinations    Hydrocodone Hives     Vicodin        Current Facility-Administered Medications:     acetaminophen (Tylenol) tablet 650 mg, 650 mg, oral, q6h PRN, Johanna Ramos APRN-CNP    [Held by provider] apixaban (Eliquis) tablet 5 mg, 5 mg, oral, BID, Naa Verdugo,  APRN-CNP    atenolol (Tenormin) tablet 25 mg, 25 mg, oral, Nightly, KELSEA Hoang-CNP    docusate sodium (Colace) capsule 100 mg, 100 mg, oral, BID, KELSEA Hoang-CNP    lactated Ringer's infusion, 50 mL/hr, intravenous, Continuous, Johanna Rachel, APRN-CNP, Last Rate: 50 mL/hr at 02/21/25 0628, 50 mL/hr at 02/21/25 0628    melatonin tablet 3 mg, 3 mg, oral, Nightly PRN, Johanna Rachel, APRN-CNP    oxyCODONE (Roxicodone) immediate release tablet 2.5 mg, 2.5 mg, oral, q4h PRN, Johanna Rachel, APRN-CNP    oxyCODONE (Roxicodone) immediate release tablet 5 mg, 5 mg, oral, q4h PRN, Johanna Rachel, APRN-CNP, 5 mg at 02/21/25 0456    oxygen (O2) therapy, , inhalation, Continuous PRN - O2/gases, Brenton Swan MD    pantoprazole (ProtoNix) EC tablet 40 mg, 40 mg, oral, Daily before breakfast **OR** pantoprazole (ProtoNix) injection 40 mg, 40 mg, intravenous, Daily before breakfast, Johanna Rachel, APRN-CNP, 40 mg at 02/21/25 0628    polyethylene glycol (Glycolax, Miralax) packet 17 g, 17 g, oral, Daily PRN, Johanna Rachel, APRN-CNP   Social Drivers of Health     Tobacco Use: Low Risk  (2/21/2025)    Patient History     Smoking Tobacco Use: Never     Smokeless Tobacco Use: Never     Passive Exposure: Not on file   Alcohol Use: Not At Risk (2/21/2025)    AUDIT-C     Frequency of Alcohol Consumption: Never     Average Number of Drinks: Patient does not drink     Frequency of Binge Drinking: Never   Financial Resource Strain: Low Risk  (2/21/2025)    Overall Financial Resource Strain (CARDIA)     Difficulty of Paying Living Expenses: Not hard at all   Food Insecurity: No Food Insecurity (2/21/2025)    Hunger Vital Sign     Worried About Running Out of Food in the Last Year: Never true     Ran Out of Food in the Last Year: Never true   Transportation Needs: No Transportation Needs (2/21/2025)    PRAPARE - Transportation     Lack of Transportation (Medical): No     Lack of Transportation (Non-Medical): No   Physical Activity:  Insufficiently Active (2/21/2025)    Exercise Vital Sign     Days of Exercise per Week: 2 days     Minutes of Exercise per Session: 30 min   Stress: No Stress Concern Present (2/21/2025)    [employer] - Occupational Stress Questionnaire     Feeling of Stress : Not at all   Social Connections: Socially Isolated (2/21/2025)    Social Connection and Isolation Panel [NHANES]     Frequency of Communication with Friends and Family: More than three times a week     Frequency of Social Gatherings with Friends and Family: More than three times a week     Attends Mu-ism Services: Never     Active Member of Clubs or Organizations: No     Attends Club or Organization Meetings: Never     Marital Status:    Intimate Partner Violence: Not At Risk (2/21/2025)    Humiliation, Afraid, Rape, and Kick questionnaire     Fear of Current or Ex-Partner: No     Emotionally Abused: No     Physically Abused: No     Sexually Abused: No   Depression: Not at risk (2/21/2025)    PHQ-2     PHQ-2 Score: 0   Housing Stability: Low Risk  (2/21/2025)    Housing Stability Vital Sign     Unable to Pay for Housing in the Last Year: No     Number of Times Moved in the Last Year: 1     Homeless in the Last Year: No   Utilities: Not At Risk (2/21/2025)    [facility] Utilities     Threatened with loss of utilities: No   Digital Equity: Not on file   Health Literacy: Adequate Health Literacy (2/21/2025)     Health Literacy     Frequency of need for help with medical instructions: Never       Physical Examination:  Vitals:    02/21/25 1200   BP: 129/56   Pulse: 66   Resp: 15   Temp: 36.1 °C (97 °F)   SpO2: 94%       Constitutional: Well developed, awake/alert/oriented x3, no distress, alert and cooperative  Eyes: PERRL, EOMI, clear sclera  ENMT: mucous membranes moist, no apparent injury, no lesions seen  Head/Neck: Neck supple, no apparent injury, thyroid without mass or tenderness, No JVD, trachea  midline  Musculoskeletal: Left lower extremity shortened, right knee moderate effusion, skin soft, tenderness over the medial femoral condyle, no laxity to gentle varus valgus stress  Extremities: normal extremities, no cyanosis edema, contusions, no clubbing  Neurological: alert and oriented x3, intact senses, motor, normal strength  Psychological: Appropriate mood and behavior  Skin: Warm and dry, no lesions, no rashes    Imaging:  Nondisplaced fracture of the medial femoral condyle right femur in the setting of some osteoarthritis and decreased bone density    Assessment:   Patient with right distal femur fracture, medial femoral condyle    Plan:   We reviewed with patient and her daughters that this fracture is likely pathologic in the sense that she has decreased bone density and had a fracture in the setting of minimal trauma.    We discussed we anticipate good healing with nonsurgical management.  Knee immobilizer when up out of bed.  Would minimize weightbearing to touchdown weightbearing for transfers.  Would recommend continue Eliquis as anticoagulant.    Aspiration could be performed for the hemarthrosis but on Eliquis will likely reaccumulate and will lose tamponade effect.  Okay to take brace off when in bed and seated, okay for gentle range of motion.  Ice, rest.    Follow-up with me in 3 weeks for repeat x-rays.

## 2025-02-21 NOTE — NURSING NOTE
Pt arrived from ed, pt alert and oriented x3  with [place] wears hearing aides in bilateral ears, pt has glasses and upper dentures, pt states she walks with cane at daughters home and has been trying to use steps and has had increase pain in r lower leg, which has brought her to the hospital pt personal belonging include  her nightgown,socks,pair of yellow earings, 2 yellow rings and a yellow watch, pt is heavily incontinent, purewick applied, educated on safety and use of ncl bed alarm applied,

## 2025-02-21 NOTE — CARE PLAN
Problem: Pain - Adult  Goal: Verbalizes/displays adequate comfort level or baseline comfort level  Outcome: Met     Problem: Safety - Adult  Goal: Free from fall injury  Outcome: Met     Problem: Fall/Injury  Goal: Not fall by end of shift  Outcome: Met     Problem: Pain  Goal: Takes deep breaths with improved pain control throughout the shift  Outcome: Met

## 2025-02-21 NOTE — ED PROVIDER NOTES
HPI   Chief Complaint   Patient presents with    Ankle Pain       89-year-old female presenting with daughter from home.  Complaining of right knee and ankle pain.  Reports that for the last several days her chairlift on her stairs at home has been broken.  She attempted to go with her 7 stairs.  Made at 6 stairs up the flight when she crossed over her legs twisting her right knee.  Daughter was able to assist her down.  She has since been not been able to ambulate.  Tylenol taken approximately 6 hours prior to my assessment.  Denies any pain at the hip does use Eliquis but had no fall.  She is on her normal mental baseline per the daughter..  She otherwise has no acute complaints including fever, chills, chest pain, urinary symptoms.              Patient History   Past Medical History:   Diagnosis Date    Anxiety 2023    Arthritis     Atrial fibrillation (Multi) 2023    Breast mass, right 2019    Coronary artery disease due to lipid rich plaque 2023    GERD (gastroesophageal reflux disease) 2023    Hip fracture, left 2023    History of femur fracture     Left    HLD (hyperlipidemia)     Incontinence of urine     Myocardial infarction (Multi)     Neuropathy 2023    Sciatica      Past Surgical History:   Procedure Laterality Date     SECTION, CLASSIC      x 2    CORONARY ANGIOPLASTY WITH STENT PLACEMENT      4 stents in  and 2 stents in     HIP FRACTURE SURGERY Left     ORIF FEMUR FRACTURE Left      Family History   Problem Relation Name Age of Onset    Heart disease Mother          CAD, CHF    Coronary artery disease Mother      Heart failure Mother      Heart disease Father          had MI, CAD    Coronary artery disease Father      Heart attack Father       Social History     Tobacco Use    Smoking status: Never    Smokeless tobacco: Never   Vaping Use    Vaping status: Never Used   Substance Use Topics    Alcohol use: Never    Drug use: Never       Physical Exam    ED Triage Vitals [02/21/25 0238]   Temperature Heart Rate Respirations BP   37.2 °C (99 °F) 65 18 138/65      Pulse Ox Temp Source Heart Rate Source Patient Position   95 % Temporal Monitor Lying      BP Location FiO2 (%)     Left arm --       Physical Exam  Vitals and nursing note reviewed.   Constitutional:       General: She is not in acute distress.     Appearance: She is well-developed. She is not ill-appearing.   HENT:      Head: Normocephalic and atraumatic.      Comments: No raccoon eyes, Davidson sign, otorrhea or rhinorrhea.  Midface stable.  No oropharyngeal trauma.     Nose: No congestion or rhinorrhea.      Mouth/Throat:      Mouth: Mucous membranes are moist.      Pharynx: No oropharyngeal exudate or posterior oropharyngeal erythema.   Eyes:      Conjunctiva/sclera: Conjunctivae normal.   Cardiovascular:      Rate and Rhythm: Normal rate and regular rhythm.      Pulses: Normal pulses.      Heart sounds: No murmur heard.     No gallop.   Pulmonary:      Effort: Pulmonary effort is normal. No respiratory distress.      Breath sounds: Normal breath sounds. No stridor. No wheezing, rhonchi or rales.   Abdominal:      General: Bowel sounds are normal. There is no distension.      Palpations: Abdomen is soft.      Tenderness: There is no abdominal tenderness. There is no guarding or rebound.   Musculoskeletal:         General: No swelling.      Cervical back: Neck supple.      Comments: 2+ dorsalis pedis, posterior tibial pulses.  2+ radial pulses.  All compartments are soft.  No joint effusions.  No bony tenderness over the ankle.  There is pain with range of motion of the right ankle.  No bony tenderness of the feet bilaterally.  Left leg is atraumatic.  Pelvis stable lateral and AP compressions.  Full range of motion of the bilateral hips without pain.  Patient does have significant pain with range of motion of the right knee.  Anterior/posterior drawer and lachman's test WNL. All other provocative  testing negative (Lizett's, varus/valgus test) no midline C-spine, T-spine, L-spine step-off deformity or tenderness.   Skin:     General: Skin is warm and dry.      Capillary Refill: Capillary refill takes less than 2 seconds.      Findings: No rash.   Neurological:      General: No focal deficit present.      Mental Status: She is alert and oriented to person, place, and time.      Cranial Nerves: No cranial nerve deficit.      Sensory: No sensory deficit.      Gait: Gait normal.   Psychiatric:         Mood and Affect: Mood normal.         Behavior: Behavior normal.         Thought Content: Thought content normal.           ED Course & MDM   ED Course as of 02/22/25 2116 Fri Feb 21, 2025   0344 XR ankle right 3+ views [TL]      ED Course User Index  [TL] Phi Hamilton,          Diagnoses as of 02/22/25 2116   Displaced fracture of medial condyle of right femur, initial encounter for closed fracture                 No data recorded     [place] Coma Scale Score: 15 (02/21/25 0241 : Moraima Wynn RN)                           Medical Decision Making  Overall well-appearing 89-year-old female presenting for right knee and ankle pain.  Crossed her leg over and became stuck on steps in her home.  Daughter was able to assist her and she did not have any actual fall.  Has been unable to ambulate the last 6 hours despite use of Tylenol at home.  Is on Eliquis but there is no sign of other traumatic injury.  Ribs are nontender.  Spine is nontender with no step-off or deformity and head is atraumatic.  At her neurologic baseline.  Vital signs are reassuring.  X-ray of the knee and ankle to be obtained.  Case discussed with her daughter who does not live that she is currently able to return home due to her ambulatory dysfunction and her inability to care for her in the state.  She has been to acute rehab before and they wish to speak to case management about this possibility.  Given it is approximately 3 in the  morning no social work, case management or physical therapy available for consultation at this time.  Patient will likely require hospitalization for further discussion while under observation status in the hospital.    Patient found to have distal femur fracture.  Neurovascular intact.  The wound is closed.  Will plan for hospitalization to the internal medicine team for inpatient orthopedic surgery consultation and pain control.  Unclear exact cause for the patient's fracture due to no fall and very minimal mechanism.  Possibility of pathologic fracture remains.  Patient's pain was well-controlled in the emergency department.  Daughter and patient updated on plan and agreeable.  Admitted to the internal medicine team.    Procedure  Procedures     Phi Hamilton DO  02/22/25 6519

## 2025-02-21 NOTE — H&P
History Of Present Illness  Noemí Hinton is a 89 y.o. female presenting with past medical history of coronary artery disease status post 4 stent hypertension paroxysmal atrial fibrillation admitted to the hospital status post mechanical fall patient had x-ray consistent right femur fracture.     Past Medical History  She has a past medical history of Anxiety (2023), Arthritis, Atrial fibrillation (Multi) (2023), Breast mass, right (), Coronary artery disease due to lipid rich plaque (2023), GERD (gastroesophageal reflux disease) (2023), Hip fracture, left (2023), History of femur fracture, HLD (hyperlipidemia), Hypertension, Incontinence of urine, Myocardial infarction (Multi), Neuropathy (2023), and Sciatica.    Surgical History  She has a past surgical history that includes Coronary angioplasty with stent; Hip fracture surgery (Left); ORIF femur fracture (Left); and  section, classic.     Social History  She reports that she has never smoked. She has never used smokeless tobacco. She reports that she does not drink alcohol and does not use drugs.    Family History  Family History   Problem Relation Name Age of Onset    Heart disease Mother          CAD, CHF    Coronary artery disease Mother      Heart failure Mother      Heart disease Father          had MI, CAD    Coronary artery disease Father      Heart attack Father          Allergies  Codeine, Hydrocodone-acetaminophen, Tramadol, and Hydrocodone    Review of Systems   Constitutional:  Negative for diaphoresis and fatigue.   HENT:  Negative for ear pain, facial swelling, tinnitus and trouble swallowing.    Eyes:  Negative for photophobia and visual disturbance.   Respiratory:  Negative for choking and stridor.    Cardiovascular:  Negative for chest pain and palpitations.   Gastrointestinal:  Negative for abdominal pain, blood in stool and diarrhea.   Endocrine: Negative for cold intolerance, heat intolerance,  "polydipsia and polyuria.   Musculoskeletal: Positive right leg pain skin:  Negative for color change and rash.   Allergic/Immunologic: Negative for food allergies.   Neurological:  Negative for tremors, facial asymmetry and weakness.   Psychiatric/Behavioral:  The patient is not hyperactive.       Physical Exam  HENT:      Right Ear: External ear normal.      Left Ear: External ear normal.      Mouth/Throat:      Mouth: Mucous membranes are moist.   Cardiovascular:      Rate and Rhythm: Normal rate and regular rhythm.      Heart sounds: No murmur heard.     No friction rub. No gallop.   Pulmonary:      Effort: No accessory muscle usage or respiratory distress.      Breath sounds: No stridor. No wheezing or rhonchi.   Chest:      Chest wall: No tenderness.   Abdominal:      General: There is no distension.      Palpations: There is no mass.      Tenderness: There is no abdominal tenderness. There is no guarding or rebound.   Musculoskeletal:      Limited range of motion over the right hip  Skin:     Coloration: Skin is not jaundiced or pale.      Findings: No lesion.   Neurological:      General: No focal deficit present.      Mental Status: He is alert, oriented to person, place, and time and easily aroused.      Cranial Nerves: No cranial nerve deficit.      Sensory: No sensory deficit.      Motor: No weakness.        Last Recorded Vitals  Blood pressure 111/52, pulse 65, temperature 36.3 °C (97.3 °F), temperature source Temporal, resp. rate 14, height 1.626 m (5' 4.02\"), weight 54.4 kg (120 lb), SpO2 94%.    Labs    Admission on 02/21/2025   Component Date Value Ref Range Status    WBC 02/21/2025 10.0  4.4 - 11.3 x10*3/uL Final    nRBC 02/21/2025 0.0  0.0 - 0.0 /100 WBCs Final    RBC 02/21/2025 4.59  4.00 - 5.20 x10*6/uL Final    Hemoglobin 02/21/2025 13.9  12.0 - 16.0 g/dL Final    Hematocrit 02/21/2025 43.5  36.0 - 46.0 % Final    MCV 02/21/2025 95  80 - 100 fL Final    MCH 02/21/2025 30.3  26.0 - 34.0 pg Final "    MCHC 02/21/2025 32.0  32.0 - 36.0 g/dL Final    RDW 02/21/2025 13.5  11.5 - 14.5 % Final    Platelets 02/21/2025 185  150 - 450 x10*3/uL Final    Glucose 02/21/2025 130 (H)  74 - 99 mg/dL Final    Sodium 02/21/2025 144  136 - 145 mmol/L Final    Potassium 02/21/2025 4.1  3.5 - 5.3 mmol/L Final    Chloride 02/21/2025 111 (H)  98 - 107 mmol/L Final    Bicarbonate 02/21/2025 24  21 - 32 mmol/L Final    Anion Gap 02/21/2025 13  10 - 20 mmol/L Final    Urea Nitrogen 02/21/2025 24 (H)  6 - 23 mg/dL Final    Creatinine 02/21/2025 0.85  0.50 - 1.05 mg/dL Final    eGFR 02/21/2025 63  >60 mL/min/1.73m*2 Final    Calculations of estimated GFR are performed using the 2021 CKD-EPI Study Refit equation without the race variable for the IDMS-Traceable creatinine methods.  https://jasn.asnjournals.org/content/early/2021/09/22/ASN.3925380745    Calcium 02/21/2025 9.7  8.6 - 10.3 mg/dL Final    Albumin 02/21/2025 3.8  3.4 - 5.0 g/dL Final    Alkaline Phosphatase 02/21/2025 55  33 - 136 U/L Final    Total Protein 02/21/2025 6.5  6.4 - 8.2 g/dL Final    AST 02/21/2025 15  9 - 39 U/L Final    Bilirubin, Total 02/21/2025 1.1  0.0 - 1.2 mg/dL Final    ALT 02/21/2025 9  7 - 45 U/L Final    Patients treated with Sulfasalazine may generate falsely decreased results for ALT.    Magnesium 02/21/2025 1.90  1.60 - 2.40 mg/dL Final    ABO TYPE 02/21/2025 A   Final    Rh TYPE 02/21/2025 POS   Final    ANTIBODY SCREEN 02/21/2025 NEG   Final    Extra Tube 02/21/2025 Hold for add-ons.   Final    Auto resulted.    Extra Tube 02/21/2025 Hold for add-ons.   Final    Auto resulted.    Protime 02/21/2025 18.9 (H)  9.8 - 12.8 seconds Final    INR 02/21/2025 1.7 (H)  0.9 - 1.1 Final    aPTT 02/21/2025 32  27 - 38 seconds Final    Extra Tube 02/21/2025 Hold for add-ons.   Final    Auto resulted.    Extra Tube 02/21/2025 Hold for add-ons.   Final    Auto resulted.         Imaging     XR knee right 4+ views  Narrative: Interpreted By:  Thuan  Samuel,   STUDY:  XR KNEE RIGHT 4+ VIEWS; ;  2/21/2025 3:30 am      INDICATION:  Signs/Symptoms:atraumatic right knee and ankle pain.          COMPARISON:  None.      ACCESSION NUMBER(S):  [id number]      ORDERING CLINICIAN:  ROJELIO FOUNTAIN      FINDINGS:  Four views of the right knee are provided for interpretation.      There is a large suprapatellar joint effusion with fluid fluid level  suggestive of hemo arthrosis. There is irregular appearance of the  medial condyle of the right femur suspicious for a mildly displaced  fracture.      Tibial plateau appears intact.      Mild joint space narrowing is present at the medial and lateral  condyles with some chondrocalcinosis, which may be seen in the  setting of CPPD.      Impression: Large suprapatellar joint effusion with hemarthrosis and irregular  appearance of the medial condyle of the right femur, suspicious for a  mildly displaced fracture. Tibial plateau appears intact. Mild joint  space narrowing is present in the medial and lateral compartments  with some chondrocalcinosis, which may be seen in CPPD arthrosis.      MACRO:  None      Signed by: Samuel Larose 2/21/2025 3:49 AM  Dictation workstation:   [id number]  XR ankle right 3+ views  Narrative: Interpreted By:  Samuel Larose,   STUDY:  XR ANKLE RIGHT 3+ VIEWS; ;  2/21/2025 3:30 am      INDICATION:  Signs/Symptoms:atraumatic right knee and ankle pain.          COMPARISON:  None.      ACCESSION NUMBER(S):  [id number]      ORDERING CLINICIAN:  ROJELIO FOUNTAIN      FINDINGS:  Three views of the right ankle are provided for interpretation.      No fracture or traumatic malalignment is identified. Diffuse bony  osteopenia is present. Ankle mortise is intact. Soft tissues do not  demonstrate any acute abnormality. Mild degenerative changes are  present at the ankle with slight joint space narrowing.      Impression: No evidence of fracture or traumatic malalignment of the right ankle.           MACRO:  None      Signed by: Samuel Larose 2/21/2025 3:40 AM  Dictation workstation:   [id number]       Patient Active Problem List   Diagnosis    Coronary artery disease due to lipid rich plaque    Primary hypertension    Atrial fibrillation (Multi)    GERD (gastroesophageal reflux disease)    Hip fracture, left    Shoulder pain, left    Diarrhea    Urinary frequency    Anxiety    Neuropathy    Supratherapeutic INR    Unable to ambulate    Pressure injury of skin of buttock    Hematuria    Dysuria    Hematoma of bladder wall    Lower abdominal pain    Other hydronephrosis    Pneumonia due to infectious organism, unspecified laterality, unspecified part of lung    Displaced fracture of medial condyle of right femur, initial encounter for closed fracture    Effusion of right knee         Assessment/Plan   Assessment & Plan  Displaced fracture of medial condyle of right femur, initial encounter for closed fracture    Effusion of right knee    Unable to ambulate      Right femur fracture displaced consult orthopedic on the patient  History of atrial fibrillation coronary artery disease consult cardiology in case surgical intervention is planned patient relatively moderate to high risk       I spent 50 minutes in the professional and overall care of this patient.      SHAUNA Chaudhry MD

## 2025-02-21 NOTE — PROGRESS NOTES
Physical Therapy                 Therapy Communication Note    Patient Name: Noemí Hinton  MRN: [id number]  Today's Date: 2/21/2025     Discipline: Physical Therapy    Missed Visit Reason: PT order received, chart reviewed. CT of right LE pending. Will hold PT evaluation at this time and complete as appropriate following further work up.     Missed Time: Attempt

## 2025-02-21 NOTE — H&P
History Of Present Illness  Noemí Hinton is a 89 y.o. female with past medical history significant for CAD s/p PCI with coronary stents, hypertension paroxysmal atrial fibrillation, hyperlipidemia, GERD, anxiety, arthritis, Hx left hip fracture s/p arthroplasty, and Hx left femur fracture s/p ORIF  presented to [facility] on 2/21/2025 from home for complaints of right knee and ankle pain.     Patient states she her chairlift at home has been broken for the last several days.  Yesterday evening she was attempting to walk up 7 stairs in her house.  She was able to make it up 6 stairs, but was unable to get onto the last step and thought she was going to fall.  Her daughter was able to lower her to the ground so that she did not fall.  Patient states she twisted her right knee prior to being assisted to the ground.  Since this incident she has been unable to ambulate and pain in the right knee has been unrelieved with taking Tylenol at home, hence her daughter called EMS to have her brought in.  Patient denies any head injury/trauma from the fall, is on Eliquis for anticoagulation related to atrial fibrillation.  Currently complaining of 8 out of 10 right knee pain describing it as throbbing/sharp and she is unable to bear weight on the right lower extremity.  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  She denies any recent fever, chills, headache, dizziness, chest pain, palpitations, shortness of breath, cough, abdominal pain, nausea, vomiting, diarrhea, dysuria, or hematuria.      ED Course:  Vital signs: Temp. 99 F, HR 65 bpm, RR 18, /65, SPO2 95% on 2 L O2 per nasal cannula  XR right ankle: No evidence of fracture or traumatic malalignment of the right ankle  XR right knee: Large suprapatellar joint effusion with hemarthrosis and irregular appearance of medial condyle of right femur, suspicious for mildly displaced fracture.  Tibial plateau appears intact.  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  Medications Given: Acetaminophen 650 mg p.o. x 1, ibuprofen 400 mg p.o. x 1  Disposition: Patient admitted for mildly displaced fracture of medial condyle of right femur with large suprapatellar joint effusion    Past Medical History  Past Medical History:   Diagnosis Date    Anxiety 2023    Arthritis     Atrial fibrillation (Multi) 2023    Breast mass, right 2019    Coronary artery disease due to lipid rich plaque 2023    GERD (gastroesophageal reflux disease) 2023    Hip fracture, left 2023    History of femur fracture     Left    HLD (hyperlipidemia)     Hypertension     Incontinence of urine     Myocardial infarction (Multi)     Neuropathy 2023    Sciatica      Surgical History  She has a past surgical history that includes Coronary angioplasty with stent; Hip fracture surgery (Left); ORIF femur fracture (Left); and  section, classic.     Social History  She reports that she has never smoked. She has never used smokeless tobacco. She reports that she does not drink alcohol and does not use drugs.    Family History  Family History   Problem Relation Name Age of Onset    Heart disease Mother          CAD, CHF    Coronary artery disease Mother      Heart failure Mother      Heart disease Father          had MI, CAD    Coronary artery disease Father      Heart attack Father          Allergies  Codeine, Hydrocodone-acetaminophen, Tramadol, and Hydrocodone    Review of Systems   Constitutional:  Negative for chills, fatigue and fever.   HENT:  Negative for hearing loss, sore throat and trouble swallowing.    Eyes:  Negative for visual disturbance.   Respiratory:  Negative for cough, chest tightness and shortness of breath.    Cardiovascular:  Negative for chest pain and palpitations.   Gastrointestinal:  Negative for abdominal distention, abdominal pain,  constipation and diarrhea.   Genitourinary:  Negative for dysuria, flank pain, frequency, hematuria and urgency.   Musculoskeletal:  Positive for arthralgias (Right knee pain), gait problem and joint swelling (Right knee). Negative for back pain.   Skin:  Negative for color change, rash and wound.   Neurological:  Positive for weakness. Negative for dizziness, syncope and light-headedness.   Psychiatric/Behavioral:  Negative for agitation, confusion and dysphoric mood. The patient is not nervous/anxious.        Physical Exam  Vitals reviewed.   Constitutional:       General: She is not in acute distress.     Appearance: She is ill-appearing.   HENT:      Head: Normocephalic.      Right Ear: External ear normal.      Left Ear: External ear normal.      Nose: Nose normal.      Mouth/Throat:      Mouth: Mucous membranes are dry.   Eyes:      General: Lids are normal. Vision grossly intact.      Pupils: Pupils are equal, round, and reactive to light.   Cardiovascular:      Rate and Rhythm: Normal rate and regular rhythm.      Pulses:           Radial pulses are 2+ on the right side and 2+ on the left side.        Dorsalis pedis pulses are 1+ on the right side and 1+ on the left side.      Heart sounds: S1 normal and S2 normal. No murmur heard.  Pulmonary:      Effort: Pulmonary effort is normal. No respiratory distress.      Breath sounds: Normal breath sounds. No wheezing, rhonchi or rales.   Abdominal:      General: Abdomen is flat. Bowel sounds are normal. There is no distension.      Palpations: Abdomen is soft.      Tenderness: There is no abdominal tenderness.   Musculoskeletal:      Right knee: Swelling and effusion present. Decreased range of motion. Tenderness present.      Left knee: Normal.      Right lower leg: Edema present.      Left lower leg: No edema.      Comments: Patient states she has a shortened left leg due to previous femur fracture that required surgery   Skin:     General: Skin is warm and dry.  "     Capillary Refill: Capillary refill takes less than 2 seconds.   Neurological:      Mental Status: She is alert and oriented to person, place, and time. Mental status is at baseline.      Sensory: Sensation is intact.      Motor: Weakness (right lower extremity) present.   Psychiatric:         Attention and Perception: Attention normal.         Mood and Affect: Mood is anxious.         Speech: Speech normal.         Behavior: Behavior normal. Behavior is cooperative.       Last Recorded Vitals  Blood pressure 122/65, pulse 53, temperature 37 °C (98.6 °F), temperature source Temporal, resp. rate 17, height 1.626 m (5' 4\"), weight 52.2 kg (115 lb), SpO2 95%.    Pain Assessment: 0-10  0-10 (Numeric) Pain Score: 8  Pain Type: Acute pain  Pain Location: Foot  Pain Orientation: Right; Lower    Relevant Results  Results for orders placed or performed during the hospital encounter of 02/21/25 (from the past 24 hours)   CBC   Result Value Ref Range    WBC 10.0 4.4 - 11.3 x10*3/uL    nRBC 0.0 0.0 - 0.0 /100 WBCs    RBC 4.59 4.00 - 5.20 x10*6/uL    Hemoglobin 13.9 12.0 - 16.0 g/dL    Hematocrit 43.5 36.0 - 46.0 %    MCV 95 80 - 100 fL    MCH 30.3 26.0 - 34.0 pg    MCHC 32.0 32.0 - 36.0 g/dL    RDW 13.5 11.5 - 14.5 %    Platelets 185 150 - 450 x10*3/uL   Comprehensive Metabolic Panel   Result Value Ref Range    Glucose 130 (H) 74 - 99 mg/dL    Sodium 144 136 - 145 mmol/L    Potassium 4.1 3.5 - 5.3 mmol/L    Chloride 111 (H) 98 - 107 mmol/L    Bicarbonate 24 21 - 32 mmol/L    Anion Gap 13 10 - 20 mmol/L    Urea Nitrogen 24 (H) 6 - 23 mg/dL    Creatinine 0.85 0.50 - 1.05 mg/dL    eGFR 63 >60 mL/min/1.73m*2    Calcium 9.7 8.6 - 10.3 mg/dL    Albumin 3.8 3.4 - 5.0 g/dL    Alkaline Phosphatase 55 33 - 136 U/L    Total Protein 6.5 6.4 - 8.2 g/dL    AST 15 9 - 39 U/L    Bilirubin, Total 1.1 0.0 - 1.2 mg/dL    ALT 9 7 - 45 U/L   Magnesium   Result Value Ref Range    Magnesium 1.90 1.60 - 2.40 mg/dL   Type And Screen   Result Value " Ref Range    ABO TYPE A     Rh TYPE POS     ANTIBODY SCREEN NEG    Light Blue Top   Result Value Ref Range    Extra Tube Hold for add-ons.    PST Top   Result Value Ref Range    Extra Tube Hold for add-ons.       XR knee right 4+ views    Result Date: 2/21/2025  Interpreted By:  Samuel Larose, STUDY: XR KNEE RIGHT 4+ VIEWS; ;  2/21/2025 3:30 am   INDICATION: Signs/Symptoms:atraumatic right knee and ankle pain.     COMPARISON: None.   ACCESSION NUMBER(S): [id number]   ORDERING CLINICIAN: ROJELIO FOUNTAIN   FINDINGS: Four views of the right knee are provided for interpretation.   There is a large suprapatellar joint effusion with fluid fluid level suggestive of hemo arthrosis. There is irregular appearance of the medial condyle of the right femur suspicious for a mildly displaced fracture.   Tibial plateau appears intact.   Mild joint space narrowing is present at the medial and lateral condyles with some chondrocalcinosis, which may be seen in the setting of CPPD.       Large suprapatellar joint effusion with hemarthrosis and irregular appearance of the medial condyle of the right femur, suspicious for a mildly displaced fracture. Tibial plateau appears intact. 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   MACRO: None   Signed by: Samuel Larose 2/21/2025 3:49 AM Dictation workstation:   [id number]    XR ankle right 3+ views    Result Date: 2/21/2025  Interpreted By:  Samuel Larose, STUDY: XR ANKLE RIGHT 3+ VIEWS; ;  2/21/2025 3:30 am   INDICATION: Signs/Symptoms:atraumatic right knee and ankle pain.     COMPARISON: None.   ACCESSION NUMBER(S): [id number]   ORDERING CLINICIAN: ROJELIO FOUNTAIN   FINDINGS: Three views of the right ankle are provided for interpretation.   No fracture or traumatic malalignment is identified. Diffuse bony osteopenia is present. Ankle mortise is intact. Soft tissues do  not demonstrate any acute abnormality. Mild degenerative changes are present at the ankle with slight joint space narrowing.       No evidence of fracture or traumatic malalignment of the right ankle.     MACRO: None   Signed by: Samuel Larose 2/21/2025 3:40 AM Dictation workstation:   [id number]        Home Medications  Prior to Admission medications    Medication Sig Start Date End Date Taking? Authorizing Provider   apixaban (Eliquis) 5 mg tablet Take 2 tablets (10 mg) by mouth 2 times a day for 6 days, THEN 1 tablet (5 mg) 2 times a day. 7/21/24 8/26/24  ARMEN Hoang   atenolol (Tenormin) 25 mg tablet Take 1 tablet (25 mg) by mouth once daily at bedtime.    Historical Provider, MD   docusate sodium (Colace) 100 mg capsule Take 1 capsule (100 mg) by mouth 2 times a day. 7/20/24   ARMEN Hoang   pantoprazole (ProtoNix) 40 mg EC tablet Take 1 tablet (40 mg) by mouth once daily in the morning. Take before meals. Do not crush, chew, or split. 7/22/24   ARMEN Hoang       Medications  Scheduled medications  [Held by provider] apixaban, 5 mg, oral, BID  pantoprazole, 40 mg, oral, Daily before breakfast   Or  pantoprazole, 40 mg, intravenous, Daily before breakfast      Continuous medications  lactated Ringer's, 50 mL/hr, Last Rate: 50 mL/hr (02/21/25 0628)      PRN medications  PRN medications: acetaminophen, melatonin, oxyCODONE, oxyCODONE, oxygen, polyethylene glycol        Assessment/Plan   Assessment & Plan  Displaced fracture of medial condyle of right femur, initial encounter for closed fracture    Effusion of right knee    Unable to ambulate        Plan:  Code Status: DNR and No Intubation.  Discussed with patient at bedside and POA/daughter Hanna Hinton via telephone who states patient has an active DNRCCA in place.  CODE STATUS verified once again with POA.  [place] DNR form completed and attached to EMR    Consult: Orthopedic surgery. POA would like to discuss  surgical vs non-surgical options. Cardiology will need to be consulted for pre-op clearance if surgery is recommended.    IVFs: LR at 50 mL/h continuous while n.p.o.    Meds: Home dose of Eliquis placed on hold.  Daily Protonix  Tylenol 650 mg p.o. every 6 hours as needed for pain 1-3/headaches, melatonin 3 milligrams p.o. daily as needed for insomnia, MiraLAX 17 gms p.o daily as needed for constipation.  Additional pain medications discussed with POA he would prefer to avoid IV narcotics at this time and start patient with lowest dose of oxycodone for pain greater than 3/10.  Oxycodone 2.5 mg p.o. every 4 hours as needed for moderate pain, oxycodone 5 mg p.o. every 4 hours as needed for severe pain.    Diet: N.p.o. except sips with medications    Labs ordered: CBC, CMP, Mg.  Coagulation screen.  Type and screen  Monitor / trend labs while inpatient.  Replace electrolytes as needed.  Notify provider of K+ less than 4.0 or Mg2+ less than 2.0  Transfuse with PRBC for hemoglobin<7.0     Test ordered: Deferred to attending and consults    Additional Orders:  Telemetry monitoring   Vital signs with BP, HR, & RR Q 4 hours.  Pulse ox Q 4 hours.   Neurovascular checks Q4H  Admission height and weight.  Oxygen via nasal cannula at 1-6 LPM as needed. Titrate for SPO2 92%.  Currently wearing oxygen for comfort.  Wean to room air as tolerated  Fall precautions   Strict bedrest  Apply ice to right knee as required  PT/OT eval and treat as indicated-hold until cleared by Ortho surgery  Call physician for temperature < 36.5 or >38.0 degrees celsius.  Call physician for pulse <50 or >120.  Call physician for respiratory rate <10 or >22.  Call physician for systolic blood pressure <90 or >170.  Call physician for diastolic blood pressure < 50 or >100.  Call physician for pulse ox <92%.    VTE prophylaxis:   On Eliquis twice daily, currently placed on hold pending orthopedic surgery evaluation.  BLE SCDs.  Monitor for s/s of  bleeding    Medication reconciliation to be completed when nursing/pharmacy  are able to verify patient's accurate home medications.    See additional orders for further plan of care. Any further evaluation and management per attending and consulting physicians.      This note has been transcribed using Dragon voice recognition system and there is a possibility of unintentional typing misprints.  Any information found to be copied from previous providers is done in the best interest of the patient to provide accurate, quality, and continuity of care.     I spent 45 minutes in the professional and overall care of this patient.      KELSEA Lam-CNP  [facility]

## 2025-02-21 NOTE — PROGRESS NOTES
Occupational Therapy                 Therapy Communication Note    Patient Name: Noemí Hinton  MRN: [id number]  Department: [facility]  Room: [facility]7/4107-A  Today's Date: 2/21/2025     Discipline: Occupational Therapy    OT Missed Visit: Yes     Missed Visit Reason:  OT orders received.  Chart reviewed.  Patient getting CT of right LE to determine plan of care.  Ortho consult pending.  Will follow up when medically appropriate for therapy evaluation.    Missed Time: Attempt

## 2025-02-21 NOTE — PROGRESS NOTES
02/21/25 1151   Discharge Planning   Living Arrangements Children   Support Systems Children   Type of Residence Private residence   Number of Stairs to Enter Residence 3   Number of Stairs Within Residence 6  (split level home)   Home or Post Acute Services Post acute facilities (Rehab/SNF/etc)   Type of Post Acute Facility Services Skilled nursing   Expected Discharge Disposition SNF   Does the patient need discharge transport arranged? Yes   RoundTrip coordination needed? Yes   Financial Resource Strain   How hard is it for you to pay for the very basics like food, housing, medical care, and heating? Not hard   Stroke Family Assessment   Stroke Family Assessment Needed No   Intensity of Service   Intensity of Service 0-30 min     Met with pt to review her dc plan. Pt lives with her daughter who provides for all of her needs. The home is a split level, pt stays in the lower level where there is a bathroom. Sleeps and sits in her recliner. Uses a stair lift to get from the lower level to the main portion of the home. The stair lift is currently broken, scheduled to be repaired today or tomorrow. Uses a walker at home and has her daughter standby for all transfers/ambulation. Ortho on consult, pt/ot val pending. Pt states we can reach out to her daughter as well.

## 2025-02-22 LAB
ANION GAP SERPL CALC-SCNC: 11 MMOL/L (ref 10–20)
BUN SERPL-MCNC: 21 MG/DL (ref 6–23)
CALCIUM SERPL-MCNC: 8.7 MG/DL (ref 8.6–10.3)
CHLORIDE SERPL-SCNC: 110 MMOL/L (ref 98–107)
CO2 SERPL-SCNC: 25 MMOL/L (ref 21–32)
CREAT SERPL-MCNC: 0.73 MG/DL (ref 0.5–1.05)
EGFRCR SERPLBLD CKD-EPI 2021: 75 ML/MIN/1.73M*2
ERYTHROCYTE [DISTWIDTH] IN BLOOD BY AUTOMATED COUNT: 13.8 % (ref 11.5–14.5)
GLUCOSE SERPL-MCNC: 100 MG/DL (ref 74–99)
HCT VFR BLD AUTO: 35.3 % (ref 36–46)
HGB BLD-MCNC: 11.2 G/DL (ref 12–16)
MAGNESIUM SERPL-MCNC: 1.84 MG/DL (ref 1.6–2.4)
MCH RBC QN AUTO: 29.9 PG (ref 26–34)
MCHC RBC AUTO-ENTMCNC: 31.7 G/DL (ref 32–36)
MCV RBC AUTO: 94 FL (ref 80–100)
NRBC BLD-RTO: 0 /100 WBCS (ref 0–0)
PLATELET # BLD AUTO: 130 X10*3/UL (ref 150–450)
POTASSIUM SERPL-SCNC: 3.8 MMOL/L (ref 3.5–5.3)
RBC # BLD AUTO: 3.74 X10*6/UL (ref 4–5.2)
SODIUM SERPL-SCNC: 142 MMOL/L (ref 136–145)
WBC # BLD AUTO: 8.8 X10*3/UL (ref 4.4–11.3)

## 2025-02-22 PROCEDURE — 85027 COMPLETE CBC AUTOMATED: CPT

## 2025-02-22 PROCEDURE — 97161 PT EVAL LOW COMPLEX 20 MIN: CPT | Mod: GP

## 2025-02-22 PROCEDURE — 2500000001 HC RX 250 WO HCPCS SELF ADMINISTERED DRUGS (ALT 637 FOR MEDICARE OP)

## 2025-02-22 PROCEDURE — 36415 COLL VENOUS BLD VENIPUNCTURE: CPT

## 2025-02-22 PROCEDURE — 80048 BASIC METABOLIC PNL TOTAL CA: CPT

## 2025-02-22 PROCEDURE — 83735 ASSAY OF MAGNESIUM: CPT

## 2025-02-22 PROCEDURE — 2500000001 HC RX 250 WO HCPCS SELF ADMINISTERED DRUGS (ALT 637 FOR MEDICARE OP): Performed by: NURSE PRACTITIONER

## 2025-02-22 PROCEDURE — 1200000002 HC GENERAL ROOM WITH TELEMETRY DAILY

## 2025-02-22 PROCEDURE — 97166 OT EVAL MOD COMPLEX 45 MIN: CPT | Mod: GO

## 2025-02-22 PROCEDURE — 97530 THERAPEUTIC ACTIVITIES: CPT | Mod: GP

## 2025-02-22 RX ORDER — OXYCODONE HYDROCHLORIDE 5 MG/1
5 TABLET ORAL EVERY 4 HOURS PRN
Qty: 10 TABLET | Refills: 0 | Status: SHIPPED | OUTPATIENT
Start: 2025-02-22

## 2025-02-22 RX ADMIN — DOCUSATE SODIUM 100 MG: 100 CAPSULE, LIQUID FILLED ORAL at 20:40

## 2025-02-22 RX ADMIN — APIXABAN 5 MG: 5 TABLET, FILM COATED ORAL at 09:13

## 2025-02-22 RX ADMIN — APIXABAN 5 MG: 5 TABLET, FILM COATED ORAL at 20:40

## 2025-02-22 RX ADMIN — ACETAMINOPHEN 650 MG: 325 TABLET ORAL at 12:21

## 2025-02-22 RX ADMIN — ACETAMINOPHEN 650 MG: 325 TABLET ORAL at 20:41

## 2025-02-22 RX ADMIN — PANTOPRAZOLE SODIUM 40 MG: 40 TABLET, DELAYED RELEASE ORAL at 06:09

## 2025-02-22 RX ADMIN — ATENOLOL 25 MG: 25 TABLET ORAL at 20:40

## 2025-02-22 RX ADMIN — ACETAMINOPHEN 650 MG: 325 TABLET ORAL at 06:09

## 2025-02-22 RX ADMIN — DOCUSATE SODIUM 100 MG: 100 CAPSULE, LIQUID FILLED ORAL at 09:13

## 2025-02-22 ASSESSMENT — PAIN - FUNCTIONAL ASSESSMENT
PAIN_FUNCTIONAL_ASSESSMENT: 0-10

## 2025-02-22 ASSESSMENT — COGNITIVE AND FUNCTIONAL STATUS - GENERAL
CLIMB 3 TO 5 STEPS WITH RAILING: TOTAL
MOVING TO AND FROM BED TO CHAIR: TOTAL
MOVING FROM LYING ON BACK TO SITTING ON SIDE OF FLAT BED WITH BEDRAILS: A LOT
MOBILITY SCORE: 8
DRESSING REGULAR UPPER BODY CLOTHING: A LOT
DRESSING REGULAR LOWER BODY CLOTHING: A LOT
TOILETING: A LOT
PERSONAL GROOMING: A LOT
HELP NEEDED FOR BATHING: A LOT
WALKING IN HOSPITAL ROOM: TOTAL
TURNING FROM BACK TO SIDE WHILE IN FLAT BAD: A LOT
DAILY ACTIVITIY SCORE: 12
STANDING UP FROM CHAIR USING ARMS: TOTAL
EATING MEALS: A LOT

## 2025-02-22 ASSESSMENT — PAIN SCALES - GENERAL
PAINLEVEL_OUTOF10: 3
PAINLEVEL_OUTOF10: 3
PAINLEVEL_OUTOF10: 9
PAINLEVEL_OUTOF10: 5 - MODERATE PAIN
PAINLEVEL_OUTOF10: 9
PAINLEVEL_OUTOF10: 5 - MODERATE PAIN
PAINLEVEL_OUTOF10: 0 - NO PAIN
PAINLEVEL_OUTOF10: 3
PAINLEVEL_OUTOF10: 0 - NO PAIN

## 2025-02-22 ASSESSMENT — PAIN DESCRIPTION - LOCATION: LOCATION: KNEE

## 2025-02-22 ASSESSMENT — PAIN DESCRIPTION - ORIENTATION
ORIENTATION: RIGHT

## 2025-02-22 ASSESSMENT — ACTIVITIES OF DAILY LIVING (ADL): BATHING_ASSISTANCE: MAXIMAL

## 2025-02-22 NOTE — CARE PLAN
The patient's goals for the shift include pain control    The clinical goals for the shift include pain will be managed    Medicated with Tylenol and 2.5mg of Oxycodone for pain. Rt leg placed In immobilizer, heels elevated on pillow.     She is forgetful at times. Bed alarm on.

## 2025-02-22 NOTE — PROGRESS NOTES
02/22/25 1328   Discharge Planning   Home or Post Acute Services Post acute facilities (Rehab/SNF/etc)   Type of Post Acute Facility Services Skilled nursing   Expected Discharge Disposition SNF     Await therapy eval.

## 2025-02-22 NOTE — CARE PLAN
The patient's goals for the shift include pain control    The clinical goals for the shift include pain will be managed      Problem: Pain  Goal: Takes deep breaths with improved pain control throughout the shift  Outcome: Progressing  Goal: Turns in bed with improved pain control throughout the shift  Outcome: Progressing  Goal: Walks with improved pain control throughout the shift  Outcome: Progressing  Goal: Performs ADL's with improved pain control throughout shift  Outcome: Progressing  Goal: Participates in PT with improved pain control throughout the shift  Outcome: Progressing  Goal: Free from opioid side effects throughout the shift  Outcome: Progressing  Goal: Free from acute confusion related to pain meds throughout the shift  Outcome: Progressing     Pt has required PRN pain medication x 1 this shift with good relief.

## 2025-02-22 NOTE — PROGRESS NOTES
Occupational Therapy    Evaluation    Patient Name: Noemí Hinton  MRN: [id number]  Department: [address]  Room: [address] [place], [place] [address]  Today's Date: 2/22/2025       Assessment  IP OT Assessment  OT Assessment: Patient would benefit from additional skilled rehab at a moderate intensity to fully maximize independence in adls, activity tolerance for adls, and functional mobility tasks for adls.  Prognosis: Good  Evaluation/Treatment Tolerance: Patient limited by pain  End of Session Communication: Bedside nurse  End of Session Patient Position: Bed, 3 rail up, Alarm on  Plan:  Treatment Interventions: ADL retraining, Functional transfer training  OT Frequency: 3 times per week  OT Discharge Recommendations: Moderate intensity level of continued care  OT - OK to Discharge: Yes (to next level of care when cleared by medical team)    Subjective   Current Problem:  1. Displaced fracture of medial condyle of right femur, initial encounter for closed fracture  oxyCODONE (Roxicodone) 5 mg immediate release tablet      2. Pneumonia due to infectious organism, unspecified laterality, unspecified part of lung  oxygen (O2) gas therapy        General:  General  Reason for Referral: activities of daily living  Referred By: Richa  Past Medical History Relevant to Rehab: (+) right medial femoral condyle fracture, PMH: CAD, PCI, HTN, AFIB, HLD, GERD, anxiety, arthritis, l hip fx w/arthroplasty  Family/Caregiver Present: Yes  Caregiver Feedback: supportive  Co-Treatment: PT  Co-Treatment Reason: safety  Prior to Session Communication: Bedside nurse  Patient Position Received: Bed, 3 rail up, Alarm on  Preferred Learning Style: verbal, visual  General Comment: Agreeable to participate  Precautions:  LE Weight Bearing Status: Right Toe-Touch Weight Bearing  Medical Precautions: Fall precautions  Precautions Comment: R knee immobilizer on when up OOB.     Date/Time Vitals Session Patient Position Pulse Resp SpO2 BP MAP (mmHg)    02/22/25 8415 --   --  --  --  96 %  --  --     02/22/25 1557 --  --  64  18  95 %  112/63  --                Pain:  Pain Assessment  Pain Assessment: 0-10  0-10 (Numeric) Pain Score: 9  Pain Type: Acute pain  Pain Location: Knee  Pain Orientation: Right  Pain Interventions: Medication (See MAR)    Objective   Cognition:  Overall Cognitive Status: Within Functional Limits  Orientation Level: Oriented X4           Home Living:  Type of Home: House  Lives With: Adult children (Daughter)  Home Living Comments: Split level home, 3 entry steps with B rails. Inside has chair lifts between levels. Sleeps in recliner on lowest level and uses bathroom on lowest level.   Prior Function:  Prior Function Comments: Daughter and patient report that patient receives assist with UE and LE adls prn by daughter. Daughter appears to complete the majority of home management and driving/shopping pta.  IADL History:     ADL:  Eating Assistance: Minimal  Grooming Assistance: Moderate  Bathing Assistance: Maximal  UE Dressing Assistance: Maximal  LE Dressing Assistance: Maximal  Activity Tolerance:  Endurance: Decreased tolerance for upright activites  Bed Mobility/Transfers: Bed Mobility  Bed Mobility: Yes  Bed Mobility 1  Bed Mobility 1:  (MAX X 2 sup to sit eob. MAX X 2 sit to stand w/belt/RW. Unable to achieve full upright posture due to pain and returned to sitting eob with MAX X2. MAX X 2 sit to supine and reposition for increased comfort in bed. All needs within reach.)     Vision:     and Vision - Complex Assessment  Ocular Range of Motion: Within Functional Limits  Sensation:  Light Touch: No apparent deficits  Strength:  Strength Comments: B UE functionally assessed, appears at least 3+/5 overall  Perception:     Coordination:  Movements are Fluid and Coordinated: No  Coordination Comment: Pain limiting factor   Hand Function:  Hand Function  Gross Grasp: Functional    Outcome Measures: [facility] Daily Activity  Putting on and taking off regular lower  body clothing: A lot  Bathing (including washing, rinsing, drying): A lot  Putting on and taking off regular upper body clothing: A lot  Toileting, which includes using toilet, bedpan or urinal: A lot  Taking care of personal grooming such as brushing teeth: A lot  Eating Meals: A lot  Daily Activity - Total Score: 12         and    Education Documentation  No documentation found.  Education Comments  No comments found.      Goals:   Encounter Problems       Encounter Problems (Active)       OT Goals       Patient will complete UE adls with MOD I (Progressing)       Start:  02/22/25    Expected End:  03/08/25            Patient will complete LE adls with MOD I (Progressing)       Start:  02/22/25    Expected End:  03/08/25            Patient will complete transfers with MOD I (Progressing)       Start:  02/22/25    Expected End:  03/08/25            Patient will complete functional mobility tasks with MOD I (Progressing)       Start:  02/22/25    Expected End:  03/08/25

## 2025-02-22 NOTE — DISCHARGE SUMMARY
Discharge Diagnosis  Displaced fracture of medial condyle of right femur, initial encounter for closed fracture    Issues Requiring Follow-Up  Discharge to skilled nursing facility    Discharge Meds     Medication List      ASK your doctor about these medications     acetaminophen 325 mg tablet; Commonly known as: Tylenol   apixaban 5 mg tablet; Commonly known as: Eliquis; Take 2 tablets (10 mg)   by mouth 2 times a day for 6 days, THEN 1 tablet (5 mg) 2 times a day.;   Start taking on: July 21, 2024   atenolol 25 mg tablet; Commonly known as: Tenormin   CRANBERRY CONCENTRATE ORAL   docusate sodium 100 mg capsule; Commonly known as: Colace; Take 1   capsule (100 mg) by mouth 2 times a day.   * MSM ORAL   * MSM ORAL   pantoprazole 40 mg EC tablet; Commonly known as: ProtoNix; Take 1 tablet   (40 mg) by mouth once daily in the morning. Take before meals. Do not   crush, chew, or split.   triamcinolone 0.025 % cream; Commonly known as: Kenalog  * This list has 2 medication(s) that are the same as other medications   prescribed for you. Read the directions carefully, and ask your doctor or   other care provider to review them with you.       Test Results Pending At Discharge  Pending Labs       No current pending labs.            Hospital Course   Patient was admitted to the hospital status post fall patient was found to have right femoral fracture orthopedic evaluated the patient and because of her age and poor condition the plan to treat her conservatively with knee immobilizer physical therapy evaluated the patient and plan to discharge her to skilled nursing facility patient had a history of atrial fibrillation she is on Eliquis  Patient developed hematoma related to the right femur fracture continue to monitor    Pertinent Physical Exam At Time of Discharge  Physical Exam  HENT:      Right Ear: External ear normal.      Left Ear: External ear normal.      Mouth/Throat:      Mouth: Mucous membranes are moist.    Cardiovascular:      Rate and Rhythm: Normal rate and regular rhythm.      Heart sounds: No murmur heard.     No friction rub. No gallop.   Pulmonary:      Effort: No accessory muscle usage or respiratory distress.      Breath sounds: No stridor. No wheezing or rhonchi.   Chest:      Chest wall: No tenderness.   Abdominal:      General: There is no distension.      Palpations: There is no mass.      Tenderness: There is no abdominal tenderness. There is no guarding or rebound.   Musculoskeletal:         General: No deformity or signs of injury.      Cervical back: No rigidity or tenderness. Normal range of motion.      Right lower leg: No edema.      Left lower leg: No edema.      Comments: Limited range of motion over the right hip and right knee   Skin:     Coloration: Skin is not jaundiced or pale.      Findings: No lesion.   Neurological:      General: No focal deficit present.      Mental Status: She is alert, oriented to person, place, and time and easily aroused.      Cranial Nerves: No cranial nerve deficit.      Sensory: No sensory deficit.      Motor: No weakness.       2/23/2025 doing okay patient seen and examined blood work was reviewed medically stable to be discharged to skilled nursing facility  Outpatient Follow-Up  No future appointments.      SHAUNA Chaudhry MD

## 2025-02-22 NOTE — PROGRESS NOTES
Physical Therapy    Physical Therapy Evaluation    Patient Name: Noemí Hinton  MRN: [id number]  Today's Date: 2/22/2025   Time Calculation  Start Time: 1152  Stop Time: 1214  Time Calculation (min): 22 min  4107/4107-A    Assessment/Plan   PT Assessment  PT Assessment Results: Decreased strength, Decreased range of motion, Decreased endurance, Impaired balance, Decreased mobility, Decreased coordination, Pain  Rehab Prognosis: Fair  Evaluation/Treatment Tolerance: Patient limited by pain  Medical Staff Made Aware: Yes  Strengths: Ability to acquire knowledge, Attitude of self  End of Session Communication: Bedside nurse  Assessment Comment: Will likely need a lengthy rehab pgm..  End of Session Patient Position: Bed, 3 rail up, Alarm on  IP OR SWING BED PT PLAN  Inpatient or Swing Bed: Inpatient  PT Plan  Treatment/Interventions: Bed mobility, Transfer training, Gait training, Strengthening, Therapeutic exercise, Therapeutic activity  PT Plan: Ongoing PT  PT Frequency: Daily  PT Discharge Recommendations: Moderate intensity level of continued care  Equipment Recommended upon Discharge: Wheeled walker  PT Recommended Transfer Status: Assist x2, Total assist  PT - OK to Discharge: Yes (When medically allowed.)    Subjective     Current Problem:  1. Displaced fracture of medial condyle of right femur, initial encounter for closed fracture  oxyCODONE (Roxicodone) 5 mg immediate release tablet      2. Pneumonia due to infectious organism, unspecified laterality, unspecified part of lung  oxygen (O2) gas therapy        Patient Active Problem List   Diagnosis    Coronary artery disease due to lipid rich plaque    Primary hypertension    Atrial fibrillation (Multi)    GERD (gastroesophageal reflux disease)    Hip fracture, left    Shoulder pain, left    Diarrhea    Urinary frequency    Anxiety    Neuropathy    Supratherapeutic INR    Unable to ambulate    Pressure injury of skin of buttock    Hematuria    Dysuria     Hematoma of bladder wall    Lower abdominal pain    Other hydronephrosis    Pneumonia due to infectious organism, unspecified laterality, unspecified part of lung    Displaced fracture of medial condyle of right femur, initial encounter for closed fracture    Effusion of right knee       General Visit Information:  General  Reason for Referral: Fall / Displaced fx. of R femur medial condyle.  Referred By: DESHAUN Chaudhry  Past Medical History Relevant to Rehab: Admit 2/21 after a fall at home.  No surgery required.  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  Family/Caregiver Present: Yes  Co-Treatment: OT  Co-Treatment Reason: safety.  Prior to Session Communication: Bedside nurse  Patient Position Received: Bed, 3 rail up, Alarm on  Preferred Learning Style: verbal, visual, written  General Comment: Has SCD's..    Home Living:  Home Living  Type of Home: House  Lives With: Adult children  Home Adaptive Equipment: Walker rolling or standard, Cane  Home Layout: Multi-level (Split)  Home Access: Stairs to enter with rails  Entrance Stairs-Number of Steps: 3  Bathroom Shower/Tub:  (DTR. assists with bathing.)  Home Living Comments: Has chair/stair lifts on all stairs.    Prior Level of Function:  Prior Function Per Pt/Caregiver Report  Level of [place]: Independent with homemaking with ambulation  Ambulatory Assistance: Independent  Vocational: Retired  Prior Function Comments: Walks in the home with a 2  w/w.      Dtr. assists with food prep., dressing, and bathing.    Precautions:  Precautions  LE Weight Bearing Status: Right Toe-Touch Weight Bearing  Medical Precautions: Fall precautions  Precautions Comment: R knee immobilizer on when up.    Vital Signs:     Objective     Pain:  Pain Assessment  Pain Assessment: 0-10  0-10 (Numeric) Pain Score: 9  Pain Type: Acute pain  Pain Location: Knee  Pain Orientation: Right    Cognition:  Cognition  Overall Cognitive Status: Within Functional Limits  Orientation Level:  Oriented X4    General Assessments:  General Observation  General Observation: very pleasant and gratious.   Activity Tolerance  Endurance: Decreased tolerance for upright activites  Sensation  Light Touch: No apparent deficits  Strength  Strength Comments: WFL in L le. R le in an immobilizer.     Coordination  Movements are Fluid and Coordinated: No  Coordination Comment: Pain is main obstacle to fluid movement.  Postural Control  Postural Control: Impaired  Posture Comment: 5'5''          Functional Assessments:     Bed Mobility  Bed Mobility: Yes  Bed Mobility 1  Bed Mobility 1: Supine to sitting, Sitting to supine  Level of Assistance 1: Maximum assistance (X2.)  Bed Mobility Comments 1: Max Ax1-2 to roll to R and L to change covidien.  Transfers  Transfer: Yes  Transfer 1  Transfer From 1: Sit to, Stand to  Transfer Device 1: Walker  Transfer Level of Assistance 1: Maximum assistance (x2.)  Trials/Comments 1: to only 1/2 stand with TTWB R maintained by therapist.  Just too much pain.  Reports a 10/10 after transfer attempt.  Ambulation/Gait Training  Ambulation/Gait Training Performed: No  Stairs  Stairs: No       Extremity/Trunk Assessments:        RLE   RLE : Exceptions to WFL  LLE   LLE : Within Functional Limits    Outcome Measures:     [facility] Basic Mobility  Turning from your back to your side while in a flat bed without using bedrails: A lot  Moving from lying on your back to sitting on the side of a flat bed without using bedrails: A lot  Moving to and from bed to chair (including a wheelchair): Total  Standing up from a chair using your arms (e.g. wheelchair or bedside chair): Total  To walk in hospital room: Total  Climbing 3-5 steps with railing: Total  Basic Mobility - Total Score: 8   Goals:  Encounter Problems       Encounter Problems (Active)       Mobility       STG - Patient will ambulate x 1-2 steps in a walker, Mod Ax2.   (Progressing)       Start:  02/22/25    Expected End:  03/08/25                PT Transfers       STG - Patient will perform bed mobility with Mod Ax1-2.   (Progressing)       Start:  02/22/25    Expected End:  03/08/25            STG - Patient will transfer sit to and from stand into a w/w with Mod Ax2.   (Progressing)       Start:  02/22/25    Expected End:  03/08/25               Pain - Adult          Safety       STG - Patient uses gait belt during all transfers and amb. trng..  (Progressing)       Start:  02/22/25    Expected End:  03/08/25                 Education Documentation  Handouts, taught by Hebert Dyer, PT at 2/22/2025  2:05 PM.  Learner: Family, Patient  Readiness: Acceptance  Method: Demonstration, Explanation  Response: Demonstrated Understanding, Needs Reinforcement    Precautions, taught by Hebert Dyer, PT at 2/22/2025  2:05 PM.  Learner: Family, Patient  Readiness: Acceptance  Method: Demonstration, Explanation  Response: Demonstrated Understanding, Needs Reinforcement    Body Mechanics, taught by Hebert Dyer, PT at 2/22/2025  2:05 PM.  Learner: Family, Patient  Readiness: Acceptance  Method: Demonstration, Explanation  Response: Demonstrated Understanding, Needs Reinforcement    Home Exercise Program, taught by Hebert Dyer, PT at 2/22/2025  2:05 PM.  Learner: Family, Patient  Readiness: Acceptance  Method: Demonstration, Explanation  Response: Demonstrated Understanding, Needs Reinforcement    Mobility Training, taught by Hebert Dyer, PT at 2/22/2025  2:05 PM.  Learner: Family, Patient  Readiness: Acceptance  Method: Demonstration, Explanation  Response: Demonstrated Understanding, Needs Reinforcement    Education Comments  No comments found.

## 2025-02-23 VITALS
HEART RATE: 72 BPM | BODY MASS INDEX: 20.49 KG/M2 | OXYGEN SATURATION: 96 % | WEIGHT: 120 LBS | TEMPERATURE: 97.2 F | RESPIRATION RATE: 16 BRPM | SYSTOLIC BLOOD PRESSURE: 130 MMHG | DIASTOLIC BLOOD PRESSURE: 62 MMHG | HEIGHT: 64 IN

## 2025-02-23 LAB
ANION GAP SERPL CALC-SCNC: 11 MMOL/L (ref 10–20)
APPEARANCE UR: CLEAR
BACTERIA #/AREA URNS AUTO: ABNORMAL /HPF
BILIRUB UR STRIP.AUTO-MCNC: NEGATIVE MG/DL
BUN SERPL-MCNC: 22 MG/DL (ref 6–23)
CALCIUM SERPL-MCNC: 8.6 MG/DL (ref 8.6–10.3)
CHLORIDE SERPL-SCNC: 108 MMOL/L (ref 98–107)
CO2 SERPL-SCNC: 23 MMOL/L (ref 21–32)
COLOR UR: YELLOW
CREAT SERPL-MCNC: 0.73 MG/DL (ref 0.5–1.05)
EGFRCR SERPLBLD CKD-EPI 2021: 75 ML/MIN/1.73M*2
ERYTHROCYTE [DISTWIDTH] IN BLOOD BY AUTOMATED COUNT: 13.5 % (ref 11.5–14.5)
GLUCOSE SERPL-MCNC: 94 MG/DL (ref 74–99)
GLUCOSE UR STRIP.AUTO-MCNC: NORMAL MG/DL
HCT VFR BLD AUTO: 34.3 % (ref 36–46)
HGB BLD-MCNC: 10.8 G/DL (ref 12–16)
KETONES UR STRIP.AUTO-MCNC: NEGATIVE MG/DL
LEUKOCYTE ESTERASE UR QL STRIP.AUTO: ABNORMAL
MAGNESIUM SERPL-MCNC: 1.97 MG/DL (ref 1.6–2.4)
MCH RBC QN AUTO: 29.8 PG (ref 26–34)
MCHC RBC AUTO-ENTMCNC: 31.5 G/DL (ref 32–36)
MCV RBC AUTO: 95 FL (ref 80–100)
MUCOUS THREADS #/AREA URNS AUTO: ABNORMAL /LPF
NITRITE UR QL STRIP.AUTO: ABNORMAL
NRBC BLD-RTO: 0 /100 WBCS (ref 0–0)
PH UR STRIP.AUTO: 5 [PH]
PLATELET # BLD AUTO: 121 X10*3/UL (ref 150–450)
POTASSIUM SERPL-SCNC: 3.5 MMOL/L (ref 3.5–5.3)
PROT UR STRIP.AUTO-MCNC: NEGATIVE MG/DL
RBC # BLD AUTO: 3.63 X10*6/UL (ref 4–5.2)
RBC # UR STRIP.AUTO: NEGATIVE MG/DL
RBC #/AREA URNS AUTO: ABNORMAL /HPF
SODIUM SERPL-SCNC: 138 MMOL/L (ref 136–145)
SP GR UR STRIP.AUTO: 1.02
SQUAMOUS #/AREA URNS AUTO: ABNORMAL /HPF
UROBILINOGEN UR STRIP.AUTO-MCNC: NORMAL MG/DL
WBC # BLD AUTO: 7.7 X10*3/UL (ref 4.4–11.3)
WBC #/AREA URNS AUTO: ABNORMAL /HPF

## 2025-02-23 PROCEDURE — 81001 URINALYSIS AUTO W/SCOPE: CPT | Performed by: NURSE PRACTITIONER

## 2025-02-23 PROCEDURE — 97530 THERAPEUTIC ACTIVITIES: CPT | Mod: GP,CQ

## 2025-02-23 PROCEDURE — 2500000001 HC RX 250 WO HCPCS SELF ADMINISTERED DRUGS (ALT 637 FOR MEDICARE OP)

## 2025-02-23 PROCEDURE — 2500000002 HC RX 250 W HCPCS SELF ADMINISTERED DRUGS (ALT 637 FOR MEDICARE OP, ALT 636 FOR OP/ED): Performed by: NURSE PRACTITIONER

## 2025-02-23 PROCEDURE — 36415 COLL VENOUS BLD VENIPUNCTURE: CPT

## 2025-02-23 PROCEDURE — 97110 THERAPEUTIC EXERCISES: CPT | Mod: GP,CQ

## 2025-02-23 PROCEDURE — 1200000002 HC GENERAL ROOM WITH TELEMETRY DAILY

## 2025-02-23 PROCEDURE — 85027 COMPLETE CBC AUTOMATED: CPT

## 2025-02-23 PROCEDURE — 83735 ASSAY OF MAGNESIUM: CPT

## 2025-02-23 PROCEDURE — 2500000001 HC RX 250 WO HCPCS SELF ADMINISTERED DRUGS (ALT 637 FOR MEDICARE OP): Performed by: NURSE PRACTITIONER

## 2025-02-23 PROCEDURE — 80048 BASIC METABOLIC PNL TOTAL CA: CPT

## 2025-02-23 RX ORDER — POTASSIUM CHLORIDE 20 MEQ/1
40 TABLET, EXTENDED RELEASE ORAL ONCE
Status: COMPLETED | OUTPATIENT
Start: 2025-02-23 | End: 2025-02-23

## 2025-02-23 RX ORDER — NITROFURANTOIN 25; 75 MG/1; MG/1
100 CAPSULE ORAL 2 TIMES DAILY
Status: DISCONTINUED | OUTPATIENT
Start: 2025-02-23 | End: 2025-02-24 | Stop reason: HOSPADM

## 2025-02-23 RX ADMIN — ATENOLOL 25 MG: 25 TABLET ORAL at 20:04

## 2025-02-23 RX ADMIN — APIXABAN 5 MG: 5 TABLET, FILM COATED ORAL at 20:04

## 2025-02-23 RX ADMIN — POTASSIUM CHLORIDE 40 MEQ: 1500 TABLET, EXTENDED RELEASE ORAL at 08:13

## 2025-02-23 RX ADMIN — PANTOPRAZOLE SODIUM 40 MG: 40 TABLET, DELAYED RELEASE ORAL at 06:02

## 2025-02-23 RX ADMIN — DOCUSATE SODIUM 100 MG: 100 CAPSULE, LIQUID FILLED ORAL at 20:04

## 2025-02-23 RX ADMIN — ACETAMINOPHEN 650 MG: 325 TABLET ORAL at 20:04

## 2025-02-23 RX ADMIN — DOCUSATE SODIUM 100 MG: 100 CAPSULE, LIQUID FILLED ORAL at 08:13

## 2025-02-23 RX ADMIN — ACETAMINOPHEN 650 MG: 325 TABLET ORAL at 06:01

## 2025-02-23 RX ADMIN — NITROFURANTOIN MONOHYDRATE/MACROCRYSTALS 100 MG: 25; 75 CAPSULE ORAL at 23:52

## 2025-02-23 RX ADMIN — APIXABAN 5 MG: 5 TABLET, FILM COATED ORAL at 08:13

## 2025-02-23 RX ADMIN — ACETAMINOPHEN 650 MG: 325 TABLET ORAL at 11:15

## 2025-02-23 ASSESSMENT — PAIN - FUNCTIONAL ASSESSMENT
PAIN_FUNCTIONAL_ASSESSMENT: 0-10

## 2025-02-23 ASSESSMENT — COGNITIVE AND FUNCTIONAL STATUS - GENERAL
MOVING TO AND FROM BED TO CHAIR: A LOT
MOVING FROM LYING ON BACK TO SITTING ON SIDE OF FLAT BED WITH BEDRAILS: A LOT
MOBILITY SCORE: 9
WALKING IN HOSPITAL ROOM: TOTAL
STANDING UP FROM CHAIR USING ARMS: TOTAL
TURNING FROM BACK TO SIDE WHILE IN FLAT BAD: A LOT
CLIMB 3 TO 5 STEPS WITH RAILING: TOTAL

## 2025-02-23 ASSESSMENT — PAIN SCALES - GENERAL
PAINLEVEL_OUTOF10: 5 - MODERATE PAIN
PAINLEVEL_OUTOF10: 2
PAINLEVEL_OUTOF10: 4
PAINLEVEL_OUTOF10: 0 - NO PAIN
PAINLEVEL_OUTOF10: 5 - MODERATE PAIN
PAINLEVEL_OUTOF10: 3

## 2025-02-23 ASSESSMENT — PAIN DESCRIPTION - LOCATION: LOCATION: HIP

## 2025-02-23 ASSESSMENT — PAIN DESCRIPTION - ORIENTATION
ORIENTATION: RIGHT
ORIENTATION: RIGHT

## 2025-02-23 NOTE — NURSING NOTE
Pt has not voided this shift, bladder scan done for 117mls.  Updated Yelena Fletcher NP via secure chat.  No new orders at this time.  Pt encouraged to increase PO intake.

## 2025-02-23 NOTE — PROGRESS NOTES
Placed a call to daughter Hanna regarding therapy recommendations and discharge planning and left message on voicemail for a call back.

## 2025-02-23 NOTE — CARE PLAN
The patient's goals for the shift include pain control    The clinical goals for the shift include pain will be managed    Over the shift, the patient did make progress toward the following goals.     Problem: Chronic Conditions and Co-morbidities  Goal: Patient's chronic conditions and co-morbidity symptoms are monitored and maintained or improved  Outcome: Progressing     Problem: Pain  Goal: Takes deep breaths with improved pain control throughout the shift  Outcome: Progressing     Problem: Skin  Goal: Promote skin healing  Outcome: Progressing  Flowsheets (Taken 2/23/2025 0418)  Promote skin healing: Turn/reposition every 2 hours/use positioning/transfer devices     Problem: Skin  Goal: Prevent/manage excess moisture  Recent Flowsheet Documentation  Taken 2/23/2025 0418 by Sonja Tellez RN  Prevent/manage excess moisture: Cleanse incontinence/protect with barrier cream

## 2025-02-23 NOTE — PROGRESS NOTES
Physical Therapy    Physical Therapy Treatment    Patient Name: Noemí Hintno  MRN: [id number]  Today's Date: 2/23/2025  Time Calculation  Start Time: 1000  Stop Time: 1030  Time Calculation (min): 30 min     4107/4107-A       02/23/25 1000   PT  Visit   PT Received On 02/23/25   Response to Previous Treatment Patient with no complaints from previous session.   General   Referred By Richa   Past Medical History Relevant to Rehab (+) right medial femoral condyle fracture, PMH: CAD, PCI, HTN, AFIB, HLD, GERD, anxiety, arthritis, l hip fx w/arthroplasty   Family/Caregiver Present No   Prior to Session Communication Bedside nurse   Patient Position Received Bed, 3 rail up;Alarm on   Preferred Learning Style verbal;visual   General Comment Agreeable to participate dispite severe pain in RLE.   Precautions   LE Weight Bearing Status Right Toe-Touch Weight Bearing   Medical Precautions Fall precautions   Precautions Comment R knee immobilizer on when up OOB.   Pain Assessment   Pain Assessment 0-10   0-10 (Numeric) Pain Score   (Pt did not rate.)   Pain Type Acute pain   Pain Location Hip   Pain Orientation Right   Pain Interventions Repositioned   Response to Interventions No change in pain   Cognition   Overall Cognitive Status WFL   Orientation Level Disoriented to time   Processing Speed WFL   Coordination   Movements are Fluid and Coordinated No   Postural Control   Postural Control Impaired   Static Sitting Balance   Static Sitting-Balance Support Bilateral upper extremity supported   Static Sitting-Level of Assistance Close supervision   Static Sitting-Comment/Number of Minutes x5 minutes   Dynamic Sitting Balance   Dynamic Sitting-Balance Support Bilateral upper extremity supported   Dynamic Sitting-Level of Assistance Close supervision   Dynamic Sitting-Balance Forward lean;Reaching across midline   Dynamic Sitting-Comments Pt displays good dynamic sitting balance with reaching activities.   Therapeutic Exercise    Therapeutic Exercise Performed Yes   Therapeutic Exercise Activity 1 PF/DF LLE x15   Therapeutic Exercise Activity 2 LAQ LLE x15   Therapeutic Exercise Activity 3 Marches LLE x15   Therapeutic Activity   Therapeutic Activity Performed Yes  (donning knee immobilizer)   Bed Mobility   Bed Mobility Yes   Bed Mobility 1   Bed Mobility 1 Supine to sitting;Sitting to supine   Level of Assistance 1 +2;Dependent   Bed Mobility Comments 1 Use of bed pad to bring legs over edge of bed. Two person assistance required at this time.   Activity Tolerance   Endurance Tolerates 10 - 20 min exercise with multiple rests   Early Mobility/Exercise Safety Screen Proceed with mobilization - No exclusion criteria met   PT Assessment   PT Assessment Results Decreased strength;Decreased range of motion;Decreased endurance;Impaired balance;Decreased mobility;Decreased coordination;Pain   Rehab Prognosis Fair   Evaluation/Treatment Tolerance Patient limited by pain   Strengths Ability to acquire knowledge   End of Session Communication Bedside nurse   Assessment Comment Pain in RLE is a significant limiting factor for exercises and bed mobility. Pt will continue to beenfit from PT interventions to improve independence with functional activities.   End of Session Patient Position Bed, 3 rail up;Alarm on   Outpatient Education   Individual(s) Educated Patient   Education Provided POC;Posture  (Pt educated on the importance of movement and mobility dispite pain.)   Risk and Benefits Discussed with Patient/Caregiver/Other yes   Patient/Caregiver Demonstrated Understanding yes   Plan of Care Discussed and Agreed Upon yes   Patient Response to Education Patient/Caregiver Verbalized Understanding of Information   PT Plan   Inpatient/Swing Bed or Outpatient Inpatient   PT Plan   Treatment/Interventions Bed mobility;Therapeutic exercise   PT Plan Ongoing PT   PT Frequency Daily   PT Discharge Recommendations Moderate intensity level of continued  care   Equipment Recommended upon Discharge Wheeled walker   PT Recommended Transfer Status Assist x2         Outcome Measures:  [facility] Basic Mobility  Turning from your back to your side while in a flat bed without using bedrails: A lot  Moving from lying on your back to sitting on the side of a flat bed without using bedrails: A lot  Moving to and from bed to chair (including a wheelchair): A lot  Standing up from a chair using your arms (e.g. wheelchair or bedside chair): Total  To walk in hospital room: Total  Climbing 3-5 steps with railing: Total  Basic Mobility - Total Score: 9           EDUCATION:  Outpatient Education  Individual(s) Educated: Patient  Education Provided: POC, Posture (Pt educated on the importance of movement and mobility dispite pain.)  Risk and Benefits Discussed with Patient/Caregiver/Other: yes  Patient/Caregiver Demonstrated Understanding: yes  Plan of Care Discussed and Agreed Upon: yes  Patient Response to Education: Patient/Caregiver Verbalized Understanding of Information      GOALS:  Encounter Problems       Encounter Problems (Active)       Mobility       STG - Patient will ambulate x 1-2 steps in a walker, Mod Ax2.   (Progressing)       Start:  02/22/25    Expected End:  03/08/25               PT Transfers       STG - Patient will perform bed mobility with Mod Ax1-2.   (Progressing)       Start:  02/22/25    Expected End:  03/08/25            STG - Patient will transfer sit to and from stand into a w/w with Mod Ax2.   (Progressing)       Start:  02/22/25    Expected End:  03/08/25               Pain - Adult          Safety       STG - Patient uses gait belt during all transfers and amb. trng..  (Progressing)       Start:  02/22/25    Expected End:  03/08/25                   I personally have reviewed data entered by the student into the flowsheet and agree with this treatment session of this patient.    Kvng Pro, PTA

## 2025-02-23 NOTE — PROGRESS NOTES
Spiritual Care Visit  Spiritual Care Request    Reason for Visit:  Routine Visit: Introduction  Continue Visiting: Yes     Request Received From:       Focus of Care:  Visited With: Patient, Health care provider         Refer to :          Spiritual Care Assessment    Spiritual Assessment:                      Care Provided:  Intended Effects: Establish rapport and connectedness, Meaning-making, [place] affirmation, Build relationship of care and support, Helping someone feel comforted, Convey a calming presence, Promote sense of peace, Journeying with someone in the grief process, Demonstrate caring and concern, Lessen someone's feelings of isolation    Sense of Community and or Rastafari Affiliation:  Buddhism         Addressed Needs/Concerns and/or Bea Through:  Rastafari Encounters  Rastafari Needs: Prayer  Sacramental Encounters  Communion: Patient wants communion  Communion Given Indicator: Yes  Sacrament of Sick-Anointing: Anointed, Patient requested anointing    Outcome:  Outcome of Spiritual Care Visit: [place], Affirmation, Comfort/healing presence, Peace/gratitude, Identifying spiritual/emotional issues, Personal disclosure/revelation, Emotional release     Advance Directives:         Spiritual Care Annotation    Annotation:  Her  of 68 years, Andre,  in 2023, and she is grieving. I comforted her as best I could.

## 2025-02-24 VITALS
BODY MASS INDEX: 20.49 KG/M2 | HEIGHT: 64 IN | RESPIRATION RATE: 18 BRPM | TEMPERATURE: 97.9 F | DIASTOLIC BLOOD PRESSURE: 63 MMHG | SYSTOLIC BLOOD PRESSURE: 142 MMHG | WEIGHT: 120 LBS | HEART RATE: 72 BPM | OXYGEN SATURATION: 95 %

## 2025-02-24 LAB
ANION GAP SERPL CALC-SCNC: 9 MMOL/L (ref 10–20)
BUN SERPL-MCNC: 17 MG/DL (ref 6–23)
CALCIUM SERPL-MCNC: 8.9 MG/DL (ref 8.6–10.3)
CHLORIDE SERPL-SCNC: 108 MMOL/L (ref 98–107)
CO2 SERPL-SCNC: 25 MMOL/L (ref 21–32)
CREAT SERPL-MCNC: 0.69 MG/DL (ref 0.5–1.05)
EGFRCR SERPLBLD CKD-EPI 2021: 80 ML/MIN/1.73M*2
ERYTHROCYTE [DISTWIDTH] IN BLOOD BY AUTOMATED COUNT: 13.5 % (ref 11.5–14.5)
GLUCOSE SERPL-MCNC: 99 MG/DL (ref 74–99)
HCT VFR BLD AUTO: 36.5 % (ref 36–46)
HGB BLD-MCNC: 11.4 G/DL (ref 12–16)
HOLD SPECIMEN: NORMAL
MAGNESIUM SERPL-MCNC: 2.04 MG/DL (ref 1.6–2.4)
MCH RBC QN AUTO: 30 PG (ref 26–34)
MCHC RBC AUTO-ENTMCNC: 31.2 G/DL (ref 32–36)
MCV RBC AUTO: 96 FL (ref 80–100)
NRBC BLD-RTO: 0 /100 WBCS (ref 0–0)
PLATELET # BLD AUTO: 146 X10*3/UL (ref 150–450)
POTASSIUM SERPL-SCNC: 4.3 MMOL/L (ref 3.5–5.3)
RBC # BLD AUTO: 3.8 X10*6/UL (ref 4–5.2)
SODIUM SERPL-SCNC: 138 MMOL/L (ref 136–145)
WBC # BLD AUTO: 8.3 X10*3/UL (ref 4.4–11.3)

## 2025-02-24 PROCEDURE — 80048 BASIC METABOLIC PNL TOTAL CA: CPT

## 2025-02-24 PROCEDURE — 36415 COLL VENOUS BLD VENIPUNCTURE: CPT

## 2025-02-24 PROCEDURE — 83735 ASSAY OF MAGNESIUM: CPT

## 2025-02-24 PROCEDURE — 97535 SELF CARE MNGMENT TRAINING: CPT | Mod: GO,CO

## 2025-02-24 PROCEDURE — 2500000001 HC RX 250 WO HCPCS SELF ADMINISTERED DRUGS (ALT 637 FOR MEDICARE OP)

## 2025-02-24 PROCEDURE — 85027 COMPLETE CBC AUTOMATED: CPT

## 2025-02-24 PROCEDURE — 97110 THERAPEUTIC EXERCISES: CPT | Mod: GP,CQ

## 2025-02-24 PROCEDURE — 97530 THERAPEUTIC ACTIVITIES: CPT | Mod: GP,CQ

## 2025-02-24 PROCEDURE — 2500000001 HC RX 250 WO HCPCS SELF ADMINISTERED DRUGS (ALT 637 FOR MEDICARE OP): Performed by: NURSE PRACTITIONER

## 2025-02-24 PROCEDURE — 2500000002 HC RX 250 W HCPCS SELF ADMINISTERED DRUGS (ALT 637 FOR MEDICARE OP, ALT 636 FOR OP/ED): Performed by: NURSE PRACTITIONER

## 2025-02-24 RX ORDER — NITROFURANTOIN 25; 75 MG/1; MG/1
100 CAPSULE ORAL 2 TIMES DAILY
Qty: 8 CAPSULE | Refills: 0 | Status: SHIPPED | OUTPATIENT
Start: 2025-02-24 | End: 2025-02-28

## 2025-02-24 RX ADMIN — APIXABAN 5 MG: 5 TABLET, FILM COATED ORAL at 08:58

## 2025-02-24 RX ADMIN — NITROFURANTOIN MONOHYDRATE/MACROCRYSTALS 100 MG: 25; 75 CAPSULE ORAL at 08:58

## 2025-02-24 RX ADMIN — PANTOPRAZOLE SODIUM 40 MG: 40 TABLET, DELAYED RELEASE ORAL at 06:12

## 2025-02-24 RX ADMIN — ACETAMINOPHEN 650 MG: 325 TABLET ORAL at 12:00

## 2025-02-24 RX ADMIN — DOCUSATE SODIUM 100 MG: 100 CAPSULE, LIQUID FILLED ORAL at 08:58

## 2025-02-24 RX ADMIN — ACETAMINOPHEN 650 MG: 325 TABLET ORAL at 06:12

## 2025-02-24 ASSESSMENT — COGNITIVE AND FUNCTIONAL STATUS - GENERAL
EATING MEALS: A LOT
PERSONAL GROOMING: A LOT
STANDING UP FROM CHAIR USING ARMS: A LOT
MOBILITY SCORE: 10
WALKING IN HOSPITAL ROOM: TOTAL
TOILETING: A LOT
TURNING FROM BACK TO SIDE WHILE IN FLAT BAD: A LOT
CLIMB 3 TO 5 STEPS WITH RAILING: TOTAL
HELP NEEDED FOR BATHING: A LOT
DRESSING REGULAR UPPER BODY CLOTHING: A LOT
MOVING FROM LYING ON BACK TO SITTING ON SIDE OF FLAT BED WITH BEDRAILS: A LOT
MOVING TO AND FROM BED TO CHAIR: A LOT
DAILY ACTIVITIY SCORE: 12
DRESSING REGULAR LOWER BODY CLOTHING: A LOT

## 2025-02-24 ASSESSMENT — PAIN SCALES - GENERAL
PAINLEVEL_OUTOF10: 3
PAINLEVEL_OUTOF10: 5 - MODERATE PAIN
PAINLEVEL_OUTOF10: 3
PAINLEVEL_OUTOF10: 2
PAINLEVEL_OUTOF10: 3

## 2025-02-24 ASSESSMENT — PAIN DESCRIPTION - LOCATION: LOCATION: KNEE

## 2025-02-24 ASSESSMENT — PAIN DESCRIPTION - ORIENTATION: ORIENTATION: LEFT

## 2025-02-24 ASSESSMENT — ACTIVITIES OF DAILY LIVING (ADL): HOME_MANAGEMENT_TIME_ENTRY: 45

## 2025-02-24 ASSESSMENT — PAIN - FUNCTIONAL ASSESSMENT: PAIN_FUNCTIONAL_ASSESSMENT: 0-10

## 2025-02-24 NOTE — CARE PLAN
Problem: Discharge Planning  Goal: Discharge to home or other facility with appropriate resources  Outcome: Progressing     Problem: Chronic Conditions and Co-morbidities  Goal: Patient's chronic conditions and co-morbidity symptoms are monitored and maintained or improved  Outcome: Progressing     Problem: Nutrition  Goal: Nutrient intake appropriate for maintaining nutritional needs  Outcome: Progressing     Problem: Pain  Goal: Walks with improved pain control throughout the shift  Outcome: Progressing     Problem: Skin  Goal: Decreased wound size/increased tissue granulation at next dressing change  Outcome: Progressing

## 2025-02-24 NOTE — CARE PLAN
The patient's goals for the shift include pain control    The clinical goals for the shift include Patient will have pain managed at a tolerable level this shift. Patient will remain free from falls. Patient will have increased mobility/ROM this shift.    Pain managed with tylenol only. Urine output increasing. Urine sample sent and positive UTI, started on Macrobid. VSS this shift. No acute events overnight. Discharge plans pending.    Problem: Discharge Planning  Goal: Discharge to home or other facility with appropriate resources  Outcome: Progressing     Problem: Chronic Conditions and Co-morbidities  Goal: Patient's chronic conditions and co-morbidity symptoms are monitored and maintained or improved  Outcome: Progressing     Problem: Nutrition  Goal: Nutrient intake appropriate for maintaining nutritional needs  Outcome: Progressing     Problem: Pain  Goal: Takes deep breaths with improved pain control throughout the shift  Outcome: Met  Goal: Turns in bed with improved pain control throughout the shift  Outcome: Met  Goal: Walks with improved pain control throughout the shift  Outcome: Not Progressing

## 2025-02-24 NOTE — PROGRESS NOTES
Left message for patient's daughter, Hanna, regarding need for skilled choice. Per chart review, they wanted referral to an [facility] and patient was at [facility] in the past. Awaiting return call.     1011  Received call back from Hanna. She said she just talked to patient and patient told her she is all set up to go to the [facility] today. Explained referrals have not been send yet and precert needed.   First choice is [facility], second is [facility] and third is [facility]. Asked [facility] to send referrals.    1417  [facility] cannot commit to a bed. [facility] has a bed. Updated patient's daughter Hanna and she is agreeable to [facility]. Asked precert team to start precert.     1515  Precert obtained. Transport arranged and patient's daughter Hanna is aware.

## 2025-02-24 NOTE — PROGRESS NOTES
"Physical Therapy    Physical Therapy    Physical Therapy Treatment    Patient Name: Noemí Hinton  MRN: [id number]  Today's Date: 2/24/2025  Time Calculation  Start Time: 1044  Stop Time: 1128  Time Calculation (min): 44 min     4107/4107-A     02/24/25 1044   PT  Visit   PT Received On 02/24/25   Response to Previous Treatment Patient with no complaints from previous session.   General   Reason for Referral activities of daily living   Referred By Richa   Past Medical History Relevant to Rehab (+) right medial femoral condyle fracture, PMH: CAD, PCI, HTN, AFIB, HLD, GERD, anxiety, arthritis, l hip fx w/arthroplasty   Prior to Session Communication Bedside nurse   Patient Position Received Bed, 3 rail up;Alarm on   Preferred Learning Style verbal;visual   General Comment Pt agreeable to tx, cleared for participation.   Precautions   LE Weight Bearing Status Right Toe-Touch Weight Bearing   Medical Precautions Fall precautions   Precautions Comment R knee immobilizer on when up OOB.   Pain Assessment   Pain Assessment 0-10   0-10 (Numeric) Pain Score   (Pt states \" a lot\" of pain in R knee with activity. Did not rate)   Patient's Stated Pain Goal 3   Pain Interventions Repositioned   Cognition   Overall Cognitive Status WFL   Orientation Level Oriented X4   Therapeutic Exercise   Therapeutic Exercise Performed Yes   Therapeutic Exercise Activity 1 AP x20, LAQ, marching, abd/add LLE x10   Therapeutic Activity   Therapeutic Activity Performed No   Balance/Neuromuscular Re-Education   Balance/Neuromuscular Re-Education Activity Performed Yes   Balance/Neuromuscular Re-Education Activity 1 Mutliple standing balance trials. MaxAx2 with third person to hold WW steady and RLE to maintain TTWB status. Pt able to clear bed with max Ax2 and cues, however, continues to demo inceased flexed posture unable to stand upright despite max v/t cues. Seated balance at EOB with SBA for safety. Mildly retropulsive with setaed " therex.Able to self correct   Bed Mobility   Bed Mobility Yes   Bed Mobility 1   Bed Mobility 1 Supine to sitting;Sitting to supine   Level of Assistance 1 Maximum assistance;+2;Moderate verbal cues;Moderate tactile cues   Bed Mobility Comments 1 HOB elevated, cues for technique, increased time and effort to complete.   Bed Mobility 2   Bed Mobility  2 Rolling right   Level of Assistance 2 Maximum assistance;Moderate verbal cues;Moderate tactile cues;+2   Bed Mobility Comments 2 max encouragement d/t increased anxiety and pain with bed mobility.   Transfers   Transfer Yes   Transfer 1   Transfer From 1 Sit to;Stand to   Technique 1 Sit to stand;Stand to sit   Transfer Device 1 Gait belt;Walker   Transfer Level of Assistance 1 Maximum assistance;x 3;Moderate tactile cues;Moderate verbal cues   Trials/Comments 1 x3 functional STS from bed level. Flexed posture, unable to stand upright. Third person to assist RLE while maintaining TTWB. Max encouragement to  maintain standing d/t incontinence and aditya care.   Activity Tolerance   Endurance Tolerates 10 - 20 min exercise with multiple rests   PT Assessment   PT Assessment Results Decreased strength;Decreased range of motion;Decreased endurance;Impaired balance;Decreased mobility;Decreased coordination;Pain   Rehab Prognosis Fair   End of Session Communication Bedside nurse   Assessment Comment Pain in RLE is a significant limiting factor for exercises and bed mobility. Pt will continue to benenfit from PT interventions to improve independence with functional activities.   End of Session Patient Position Bed, 3 rail up;Alarm on       Outcome Measures:  [facility] Basic Mobility  Turning from your back to your side while in a flat bed without using bedrails: A lot  Moving from lying on your back to sitting on the side of a flat bed without using bedrails: A lot  Moving to and from bed to chair (including a wheelchair): A lot  Standing up from a chair using your arms (e.g.  wheelchair or bedside chair): A lot  To walk in hospital room: Total  Climbing 3-5 steps with railing: Total  Basic Mobility - Total Score: 10                             EDUCATION:  Outpatient Education  Individual(s) Educated: Patient  Education Provided: POC, Posture (Pt educated on the importance of movement and mobility dispite pain.)  Risk and Benefits Discussed with Patient/Caregiver/Other: yes  Patient/Caregiver Demonstrated Understanding: yes  Plan of Care Discussed and Agreed Upon: yes  Patient Response to Education: Patient/Caregiver Verbalized Understanding of Information  Education Documentation  Handouts, taught by Naa Horta PTA at 2/24/2025 12:39 PM.  Learner: Patient  Readiness: Acceptance  Method: Explanation, Demonstration  Response: Verbalizes Understanding, Needs Reinforcement    Precautions, taught by Naa Horta PTA at 2/24/2025 12:39 PM.  Learner: Patient  Readiness: Acceptance  Method: Explanation, Demonstration  Response: Verbalizes Understanding, Needs Reinforcement    Body Mechanics, taught by Naa Horta PTA at 2/24/2025 12:39 PM.  Learner: Patient  Readiness: Acceptance  Method: Explanation, Demonstration  Response: Verbalizes Understanding, Needs Reinforcement    Home Exercise Program, taught by Naa Hotra PTA at 2/24/2025 12:39 PM.  Learner: Patient  Readiness: Acceptance  Method: Explanation, Demonstration  Response: Verbalizes Understanding, Needs Reinforcement    Mobility Training, taught by Naa Horta PTA at 2/24/2025 12:39 PM.  Learner: Patient  Readiness: Acceptance  Method: Explanation, Demonstration  Response: Verbalizes Understanding, Needs Reinforcement    Education Comments  No comments found.        GOALS:  Encounter Problems       Encounter Problems (Active)       Mobility       STG - Patient will ambulate x 1-2 steps in a walker, Mod Ax2.   (Progressing)       Start:  02/22/25    Expected End:  03/08/25               PT Transfers       STG - Patient  will perform bed mobility with Mod Ax1-2.   (Progressing)       Start:  02/22/25    Expected End:  03/08/25            STG - Patient will transfer sit to and from stand into a w/w with Mod Ax2.   (Progressing)       Start:  02/22/25    Expected End:  03/08/25               Pain - Adult          Safety       STG - Patient uses gait belt during all transfers and amb. trng..  (Progressing)       Start:  02/22/25    Expected End:  03/08/25

## 2025-02-24 NOTE — PROGRESS NOTES
Occupational Therapy    Occupational Therapy    OT Treatment    Patient Name: Noemí Hinton  MRN: [id number]  Today's Date: 2/24/2025          4107/4107-A    Assessment:  End of Session Communication: Bedside nurse  End of Session Patient Position: Bed, 3 rail up, Alarm on       Plan:  OT Frequency: 3 times per week  OT Discharge Recommendations: Moderate intensity level of continued care     Subjective      02/24/25 1047   OT Last Visit   OT Received On 02/24/25   General   Reason for Referral activities of daily living   Referred By Richa   Past Medical History Relevant to Rehab (+) right medial femoral condyle fracture, PMH: CAD, PCI, HTN, AFIB, HLD, GERD, anxiety, arthritis, l hip fx w/arthroplasty   Prior to Session Communication Bedside nurse   Patient Position Received Bed, 3 rail up;Alarm on   General Comment Pt agreeable to tx, cleared for participation.   Precautions   LE Weight Bearing Status Right Toe-Touch Weight Bearing   Medical Precautions Fall precautions   Precautions Comment R knee immobilizer on when up OOB.   Pain Assessment   Pain Assessment   (did not rate, stated a lot of pain in R knee.)   Pain Interventions Rest;Repositioned; cues for breathing techniques.    Cognition   Orientation Level Oriented X4   Grooming   Grooming Level of Assistance Close supervision   Grooming Where Assessed Bed level   Toileting   Toileting Level of Assistance Dependent    Where Assessed Bed level   Toileting Comments Pt required anterior and posterior aditya care at EOB while in stance and at bed level supine.   Bed Mobility 1   Bed Mobility 1 Supine to sitting;Sitting to supine   Level of Assistance 1 Maximum assistance;+2;Moderate verbal cues;Moderate tactile cues   Bed Mobility Comments 1 HOB elevated, draw sheet used to elevate trunk. Cues for proper hand placement.   Transfer 1   Technique 1 Sit to stand;Stand to sit   Transfer Device 1 Gait belt;Walker   Transfer Level of Assistance 1 Maximum assistance;x  3;Moderate tactile cues;Moderate verbal cues   Trials/Comments 1 STS 3x, pt with limited standing tolerance, third person needed to maintain precautions, cues for upright stance.   IP OT Assessment   End of Session Communication Bedside nurse   End of Session Patient Position Bed, 3 rail up;Alarm on   Inpatient Plan   OT Frequency 3 times per week   OT Discharge Recommendations Moderate intensity level of continued care     Outcome Measures:[facility] Daily Activity  Putting on and taking off regular lower body clothing: A lot  Bathing (including washing, rinsing, drying): A lot  Putting on and taking off regular upper body clothing: A lot  Toileting, which includes using toilet, bedpan or urinal: A lot  Taking care of personal grooming such as brushing teeth: A lot  Eating Meals: A lot  Daily Activity - Total Score: 12  Education Documentation  No documentation found.  Education Comments  No comments found.            Goals:  Encounter Problems       Encounter Problems (Active)       OT Goals       Patient will complete UE adls with MOD I (Progressing)       Start:  02/22/25    Expected End:  03/08/25            Patient will complete LE adls with MOD I (Progressing)       Start:  02/22/25    Expected End:  03/08/25            Patient will complete transfers with MOD I (Progressing)       Start:  02/22/25    Expected End:  03/08/25            Patient will complete functional mobility tasks with MOD I (Progressing)       Start:  02/22/25    Expected End:  03/08/25                       DEBBI CAAL was present for supervision of this student during the treatment and documentation of this patient. BRIDGET Calabrese/TENZIN

## 2025-02-24 NOTE — CARE PLAN
Problem: Discharge Planning  Goal: Discharge to home or other facility with appropriate resources  2/24/2025 1706 by Lidia Davis RN  Outcome: Adequate for Discharge  2/24/2025 1016 by Lidia Davis RN  Outcome: Progressing     Problem: Chronic Conditions and Co-morbidities  Goal: Patient's chronic conditions and co-morbidity symptoms are monitored and maintained or improved  2/24/2025 1706 by Lidia Davis RN  Outcome: Adequate for Discharge  2/24/2025 1016 by Lidia Davis RN  Outcome: Progressing     Problem: Nutrition  Goal: Nutrient intake appropriate for maintaining nutritional needs  2/24/2025 1706 by Lidia Davis RN  Outcome: Adequate for Discharge  2/24/2025 1016 by Lidia Davis RN  Outcome: Progressing     Problem: Pain  Goal: Walks with improved pain control throughout the shift  2/24/2025 1706 by Lidia Davis RN  Outcome: Adequate for Discharge  2/24/2025 1016 by Lidia Davis RN  Outcome: Progressing  Goal: Performs ADL's with improved pain control throughout shift  2/24/2025 1706 by Lidia Davis RN  Outcome: Adequate for Discharge  2/24/2025 1016 by Lidia Davis RN  Outcome: Progressing  Goal: Participates in PT with improved pain control throughout the shift  2/24/2025 1706 by Lidia Davis RN  Outcome: Adequate for Discharge  2/24/2025 1016 by Lidia Davis RN  Outcome: Progressing  Goal: Free from opioid side effects throughout the shift  2/24/2025 1706 by Lidia Davis RN  Outcome: Adequate for Discharge  2/24/2025 1016 by Lidia Davis RN  Outcome: Progressing  Goal: Free from acute confusion related to pain meds throughout the shift  2/24/2025 1706 by Lidia Davis RN  Outcome: Adequate for Discharge  2/24/2025 1016 by Lidia Davis RN  Outcome: Progressing     Problem: Skin  Goal: Decreased wound size/increased tissue granulation at next dressing change  2/24/2025 1706 by Lidia Dvais RN  Outcome: Adequate for Discharge  2/24/2025 1016 by Lidia  MARTHA Davis  Outcome: Progressing  Goal: Participates in plan/prevention/treatment measures  2/24/2025 1706 by Lidia Davis RN  Outcome: Adequate for Discharge  2/24/2025 1016 by Lidia Davis RN  Outcome: Progressing  Goal: Prevent/manage excess moisture  2/24/2025 1706 by Lidia Davis RN  Outcome: Adequate for Discharge  2/24/2025 1016 by Lidia Davis RN  Outcome: Progressing  Goal: Prevent/minimize sheer/friction injuries  2/24/2025 1706 by Lidia Davis RN  Outcome: Adequate for Discharge  2/24/2025 1016 by Lidia Davis RN  Outcome: Progressing  Goal: Promote/optimize nutrition  2/24/2025 1706 by Lidia Davis RN  Outcome: Adequate for Discharge  2/24/2025 1016 by Ldiia Davis RN  Outcome: Progressing  Goal: Promote skin healing  2/24/2025 1706 by Lidia Davis RN  Outcome: Adequate for Discharge  2/24/2025 1016 by Lidia Davis RN  Outcome: Progressing

## 2025-02-25 ENCOUNTER — NURSING HOME VISIT (OUTPATIENT)
Dept: POST ACUTE CARE | Facility: EXTERNAL LOCATION | Age: OVER 89
End: 2025-02-25
Payer: MEDICARE

## 2025-02-25 DIAGNOSIS — I48.91 ATRIAL FIBRILLATION, UNSPECIFIED TYPE (MULTI): ICD-10-CM

## 2025-02-25 DIAGNOSIS — S72.431A DISPLACED FRACTURE OF MEDIAL CONDYLE OF RIGHT FEMUR, INITIAL ENCOUNTER FOR CLOSED FRACTURE: ICD-10-CM

## 2025-02-25 DIAGNOSIS — I10 PRIMARY HYPERTENSION: ICD-10-CM

## 2025-02-25 DIAGNOSIS — I25.83 CORONARY ARTERY DISEASE DUE TO LIPID RICH PLAQUE: Primary | ICD-10-CM

## 2025-02-25 DIAGNOSIS — I25.10 CORONARY ARTERY DISEASE DUE TO LIPID RICH PLAQUE: Primary | ICD-10-CM

## 2025-02-25 DIAGNOSIS — R26.2 UNABLE TO AMBULATE: ICD-10-CM

## 2025-02-25 PROCEDURE — 99306 1ST NF CARE HIGH MDM 50: CPT | Performed by: STUDENT IN AN ORGANIZED HEALTH CARE EDUCATION/TRAINING PROGRAM

## 2025-02-25 NOTE — DOCUMENTATION CLARIFICATION NOTE
PATIENT:               SOCORRO HOWARD  ACCT #:                  [id number]  MRN:                       [id number]  :                       1928  ADMIT DATE:       2025 2:28 AM  DISCH DATE:        2025 7:03 PM  RESPONDING PROVIDER #:        [id number]          PROVIDER RESPONSE TEXT:    UTI present and required treatment/monitoring    CDI QUERY TEXT:    Clarification    Instruction:    Based on your assessment of the patient and the clinical information, please provide the requested documentation by clicking on the appropriate radio button and enter any additional information if prompted.    Question: Is there a diagnosis indicative of the lab values and treatment    When answering this query, please exercise your independent professional judgment. The fact that a question is being asked, does not imply that any particular answer is desired or expected.    The patient's clinical indicators include:  Clinical Information:  Admitted for Right Femur Fracture.    Clinical Indicators:  UA : nitrite 1 plus, leukocyte esterase 250, WBC 6-10.    Started on Macrobid PO on .    Treatment: UA w/culture.  Macrobid 100 mg PO two times daily -.    Risk Factors: Elderly female w/urinary frequency, abnormal UA.  Options provided:  -- UTI present and required treatment/monitoring  -- Other - I will add my own diagnosis  -- Refer to Clinical Documentation Reviewer    Query created by: Jessi Friedman on 2025 2:24 PM      Electronically signed by:  SHAUNA REILLY MD 2025 6:55 AM

## 2025-02-25 NOTE — DOCUMENTATION CLARIFICATION NOTE
"    PATIENT:               SOCORRO HOWARD  ACCT #:                  [id number]  MRN:                       [id number]  :                       1928  ADMIT DATE:       2025 2:28 AM  DISCH DATE:        2025 7:03 PM  RESPONDING PROVIDER #:        [id number]          PROVIDER RESPONSE TEXT:    Pathological nondisplaced fracture of right femur ruled in for this admission    CDI QUERY TEXT:    Clarification    Instruction:    Based on your assessment of the patient and the clinical information, please provide the requested documentation by clicking on the appropriate radio button and enter any additional information if prompted.    Question: Please clarify in regard to documentation of likely pathological fracture    When answering this query, please exercise your independent professional judgment. The fact that a question is being asked, does not imply that any particular answer is desired or expected.    The patient's clinical indicators include:  Clinical Information:  Presenting w/right ankle, knee pain without fall.    Clinical Indicators:  Orthopedic note 7 PM states \"noted to have a nondisplaced fracture of her medial femoral condyle and secondary hemarthrosis\", \"this fracture is likely pathologic in the sense that she has decreased bone density\", \"We discussed we anticipate good healing with nonsurgical management\".    Tentative discharge summary by IM  1042 AM states \"Displaced fracture of medial condyle of right femur, initial encounter for closed fracture\".    Treatment: CT Right Femur.  Orthopedic consult, no surgical intervention planned.    Risk Factors: Elderly female w/decreased bone density presenting w/new fracture.  Options provided:  -- Pathological nondisplaced fracture of right femur ruled in for this admission  -- Pathological nondisplaced fracture of right femur ruled out for this admission  -- Other - I will add my own diagnosis  -- Refer to Clinical Documentation " Reviewer    Query created by: Jessi Friedman on 2/24/2025 2:17 PM      Electronically signed by:  SHAUNA REILLY MD 2/25/2025 6:55 AM

## 2025-02-25 NOTE — LETTER
Patient: Noemí Hinton  : 1928    Encounter Date: 2025    Subjective  Patient ID: Noemí Hinton is a 89 y.o. female.    89-year-old female history of some coronary artery disease with prior stent placement also has a history of atrial fibrillation who is on Eliquis. hypertension paroxysmal atrial fibrillation, hyperlipidemia, GERD, anxiety, arthritis, Hx left hip fracture s/p arthroplasty, and Hx left femur fracture s/p ORIF Patient presents to the hospital after she was trying to walk up stairs in her house, and was unable to make up the last stair, her daughter was able to lower her to the ground and did not fall.  Patient unfortunately twisted her knee prior to being assisted to the ground, and had significant pain secondary to this.  Due to this, EMS was called, and patient was brought to the ED for further evaluation.  Patient was found to have a right femur fracture of the femoral condyle.  Orthopedics was consulted who discussed surgical as well as nonsurgical options.  Ultimately, elected for nonsurgical option and transferred to SNF in stable condition.  On evaluation, patient regards that she is overall feeling well, states pain is overall well-controlled however still weak from her fall.  Otherwise, states transfer to the facility overall went well.  Otherwise, is excited to work with PT OT, denies any additional issues or concerns at this time.         Review of Systems   Constitutional:  Positive for fatigue.   HENT: Negative.     Respiratory: Negative.     Cardiovascular: Negative.    Gastrointestinal: Negative.    Musculoskeletal:  Positive for arthralgias.   Neurological:  Positive for weakness.   Psychiatric/Behavioral: Negative.         ObjectiveVitals Reviewed via facility EMR   Physical Exam  Constitutional:       General: She is not in acute distress.     Appearance: She is not ill-appearing.      Comments: Pleasant elderly female   Eyes:      Pupils: Pupils are equal, round, and  reactive to light.   Cardiovascular:      Rate and Rhythm: Normal rate and regular rhythm.      Pulses: Normal pulses.      Heart sounds: No murmur heard.  Pulmonary:      Effort: No respiratory distress.      Breath sounds: No wheezing.   Abdominal:      General: Abdomen is flat. Bowel sounds are normal. There is no distension.   Musculoskeletal:      Right lower leg: No edema.      Left lower leg: No edema.   Skin:     General: Skin is warm and dry.   Neurological:      Mental Status: She is alert. Mental status is at baseline.      Cranial Nerves: No cranial nerve deficit.      Motor: Weakness present.   Psychiatric:         Mood and Affect: Mood normal.         Behavior: Behavior normal.         Assessment/Plan  Diagnoses and all orders for this visit:  Coronary artery disease due to lipid rich plaque  Atrial fibrillation, unspecified type (Multi)  Primary hypertension  Displaced fracture of medial condyle of right femur, initial encounter for closed fracture  Unable to ambulate  Patient seen and examined at bedside.  Hospital course reviewed and medications reviewed and reconciled, continue optimization with physical therapy and monitor pain control due to recent fall and fracture.  Due to recent fall at home, will need to assess living situation prior to discharge potentially could consider assisted living or long-term care pending how patient recovers from physical therapy.  Otherwise continue supportive care no additional issues at this time.     Reviewed and approved by TALON CAMARA on 2/26/25 at 8:59 PM.         Electronically Signed By: Talon Camara DO   2/26/25  9:00 PM

## 2025-02-26 ASSESSMENT — ENCOUNTER SYMPTOMS
GASTROINTESTINAL NEGATIVE: 1
RESPIRATORY NEGATIVE: 1
PSYCHIATRIC NEGATIVE: 1
WEAKNESS: 1
ARTHRALGIAS: 1
FATIGUE: 1
CARDIOVASCULAR NEGATIVE: 1

## 2025-02-26 NOTE — DOCUMENTATION CLARIFICATION NOTE
"    PATIENT:               SOCORRO HOWARD  ACCT #:                  [id number]  MRN:                       [id number]  :                       1928  ADMIT DATE:       2025 2:28 AM  DISCH DATE:        2025 7:03 PM  RESPONDING PROVIDER #:        [id number]          PROVIDER RESPONSE TEXT:    Hemarthrosis not due to or enhanced by Eliquis use    CDI QUERY TEXT:    Clarification    Instruction:    Based on your assessment of the patient and the clinical information, please provide the requested documentation by clicking on the appropriate radio button and enter any additional information if prompted.    Question: Please further clarify if a relationship exists between Hemarthrosis and use of Eliquis    When answering this query, please exercise your independent professional judgment. The fact that a question is being asked, does not imply that any particular answer is desired or expected.    The patient's clinical indicators include:  Clinical Information:  Admitted for Right Femur Fracture w/Hemarthrosis.    Clinical Indicators:  PT/INR: 18.9/1.7 on .    Hgb: 13.9, 11.2, 10.8, 11.4 on -.    Orthopedic note 2829 1911 PM states \"history of atrial fibrillation who is on Eliquis\", \"Aspiration could be performed for the hemarthrosis but on Eliquis will likely reaccumulate and will lose tamponade effect.\"    Treatment: Lab monitoring of CBC, PT/INR.  Eliquis held on admission.    Risk Factors: Elderly female w/hx of Paroxysmal AFib on Eliquis w/Right Femur Fracture, Hemarthrosis on presentation.  Options provided:  -- Hemarthrosis due to or enhanced by Eliquis use  -- Hemarthrosis not due to or enhanced by Eliquis use  -- Other - I will add my own diagnosis  -- Refer to Clinical Documentation Reviewer    Query created by: Jessi Friedman on 2025 2:34 PM      Electronically signed by:  SHAUNA REILLY MD 2025 7:06 AM          "

## 2025-02-27 NOTE — PROGRESS NOTES
Subjective   Patient ID: Noemí Hinton is a 89 y.o. female.    89-year-old female history of some coronary artery disease with prior stent placement also has a history of atrial fibrillation who is on Eliquis. hypertension paroxysmal atrial fibrillation, hyperlipidemia, GERD, anxiety, arthritis, Hx left hip fracture s/p arthroplasty, and Hx left femur fracture s/p ORIF Patient presents to the hospital after she was trying to walk up stairs in her house, and was unable to make up the last stair, her daughter was able to lower her to the ground and did not fall.  Patient unfortunately twisted her knee prior to being assisted to the ground, and had significant pain secondary to this.  Due to this, EMS was called, and patient was brought to the ED for further evaluation.  Patient was found to have a right femur fracture of the femoral condyle.  Orthopedics was consulted who discussed surgical as well as nonsurgical options.  Ultimately, elected for nonsurgical option and transferred to SNF in stable condition.  On evaluation, patient regards that she is overall feeling well, states pain is overall well-controlled however still weak from her fall.  Otherwise, states transfer to the facility overall went well.  Otherwise, is excited to work with PT OT, denies any additional issues or concerns at this time.         Review of Systems   Constitutional:  Positive for fatigue.   HENT: Negative.     Respiratory: Negative.     Cardiovascular: Negative.    Gastrointestinal: Negative.    Musculoskeletal:  Positive for arthralgias.   Neurological:  Positive for weakness.   Psychiatric/Behavioral: Negative.         Objective Vitals Reviewed via facility EMR   Physical Exam  Constitutional:       General: She is not in acute distress.     Appearance: She is not ill-appearing.      Comments: Pleasant elderly female   Eyes:      Pupils: Pupils are equal, round, and reactive to light.   Cardiovascular:      Rate and Rhythm: Normal rate  and regular rhythm.      Pulses: Normal pulses.      Heart sounds: No murmur heard.  Pulmonary:      Effort: No respiratory distress.      Breath sounds: No wheezing.   Abdominal:      General: Abdomen is flat. Bowel sounds are normal. There is no distension.   Musculoskeletal:      Right lower leg: No edema.      Left lower leg: No edema.   Skin:     General: Skin is warm and dry.   Neurological:      Mental Status: She is alert. Mental status is at baseline.      Cranial Nerves: No cranial nerve deficit.      Motor: Weakness present.   Psychiatric:         Mood and Affect: Mood normal.         Behavior: Behavior normal.         Assessment/Plan   Diagnoses and all orders for this visit:  Coronary artery disease due to lipid rich plaque  Atrial fibrillation, unspecified type (Multi)  Primary hypertension  Displaced fracture of medial condyle of right femur, initial encounter for closed fracture  Unable to ambulate  Patient seen and examined at bedside.  Hospital course reviewed and medications reviewed and reconciled, continue optimization with physical therapy and monitor pain control due to recent fall and fracture.  Due to recent fall at home, will need to assess living situation prior to discharge potentially could consider assisted living or long-term care pending how patient recovers from physical therapy.  Otherwise continue supportive care no additional issues at this time.     Reviewed and approved by RICHARD CAMARA on 2/26/25 at 8:59 PM.

## 2025-02-28 ENCOUNTER — NURSING HOME VISIT (OUTPATIENT)
Dept: POST ACUTE CARE | Facility: EXTERNAL LOCATION | Age: OVER 89
End: 2025-02-28
Payer: MEDICARE

## 2025-02-28 DIAGNOSIS — R26.2 UNABLE TO AMBULATE: ICD-10-CM

## 2025-02-28 DIAGNOSIS — M25.461 EFFUSION OF RIGHT KNEE: ICD-10-CM

## 2025-02-28 DIAGNOSIS — S72.431A DISPLACED FRACTURE OF MEDIAL CONDYLE OF RIGHT FEMUR, INITIAL ENCOUNTER FOR CLOSED FRACTURE: ICD-10-CM

## 2025-02-28 DIAGNOSIS — I48.91 ATRIAL FIBRILLATION, UNSPECIFIED TYPE (MULTI): Primary | ICD-10-CM

## 2025-02-28 DIAGNOSIS — I10 PRIMARY HYPERTENSION: ICD-10-CM

## 2025-02-28 PROCEDURE — 99308 SBSQ NF CARE LOW MDM 20: CPT | Performed by: STUDENT IN AN ORGANIZED HEALTH CARE EDUCATION/TRAINING PROGRAM

## 2025-02-28 NOTE — LETTER
Patient: Noemí Hinton  : 1928    Encounter Date: 2025    Subjective  Patient ID: Noemí Hinton is a 89 y.o. female.    Patient seen and examined on routine follow-up.  Regards that she is overall doing well, is working well with therapy, pain is overall well-controlled.  Is still having difficulties with activities of daily living.  Otherwise, denies any acute issues or concerns and overall feels well.         Review of Systems   Constitutional: Negative.    HENT: Negative.     Respiratory: Negative.     Cardiovascular: Negative.    Gastrointestinal: Negative.    Musculoskeletal: Negative.    Neurological: Negative.    Psychiatric/Behavioral: Negative.         ObjectiveVitals Reviewed via facility EMR   Physical Exam  Constitutional:       General: She is not in acute distress.     Appearance: She is not ill-appearing.      Comments: Elderly female, in bed   Eyes:      Pupils: Pupils are equal, round, and reactive to light.   Cardiovascular:      Rate and Rhythm: Normal rate and regular rhythm.      Pulses: Normal pulses.      Heart sounds: No murmur heard.  Pulmonary:      Effort: No respiratory distress.      Breath sounds: No wheezing.   Abdominal:      General: Abdomen is flat. Bowel sounds are normal. There is no distension.   Musculoskeletal:      Right lower leg: No edema.      Left lower leg: No edema.   Skin:     General: Skin is warm and dry.   Neurological:      Mental Status: She is alert. Mental status is at baseline.      Cranial Nerves: No cranial nerve deficit.      Motor: Weakness present.   Psychiatric:         Mood and Affect: Mood normal.         Behavior: Behavior normal.         Assessment/Plan  Diagnoses and all orders for this visit:  Atrial fibrillation, unspecified type (Multi)  Primary hypertension  Displaced fracture of medial condyle of right femur, initial encounter for closed fracture  Effusion of right knee  Unable to ambulate      Patient seen and examined at bedside.   Overall medically stable continue optimization with physical therapy.  Will need to see how patient does with activities of daily living and overall level of functioning.  May require higher level of need moving forwards such as assisted living or long-term care.  Otherwise, no additional issues at this time.  Continue supportive care discussed care plan with staff.     Reviewed and approved by TALON CAMARA on 3/1/25 at 1:53 PM.       Electronically Signed By: Talon Camara DO   3/1/25  1:53 PM

## 2025-03-01 ASSESSMENT — ENCOUNTER SYMPTOMS
RESPIRATORY NEGATIVE: 1
CARDIOVASCULAR NEGATIVE: 1
PSYCHIATRIC NEGATIVE: 1
MUSCULOSKELETAL NEGATIVE: 1
NEUROLOGICAL NEGATIVE: 1
CONSTITUTIONAL NEGATIVE: 1
GASTROINTESTINAL NEGATIVE: 1

## 2025-03-01 NOTE — PROGRESS NOTES
Subjective   Patient ID: Noemí Hinton is a 89 y.o. female.    Patient seen and examined on routine follow-up.  Regards that she is overall doing well, is working well with therapy, pain is overall well-controlled.  Is still having difficulties with activities of daily living.  Otherwise, denies any acute issues or concerns and overall feels well.         Review of Systems   Constitutional: Negative.    HENT: Negative.     Respiratory: Negative.     Cardiovascular: Negative.    Gastrointestinal: Negative.    Musculoskeletal: Negative.    Neurological: Negative.    Psychiatric/Behavioral: Negative.         Objective Vitals Reviewed via facility EMR   Physical Exam  Constitutional:       General: She is not in acute distress.     Appearance: She is not ill-appearing.      Comments: Elderly female, in bed   Eyes:      Pupils: Pupils are equal, round, and reactive to light.   Cardiovascular:      Rate and Rhythm: Normal rate and regular rhythm.      Pulses: Normal pulses.      Heart sounds: No murmur heard.  Pulmonary:      Effort: No respiratory distress.      Breath sounds: No wheezing.   Abdominal:      General: Abdomen is flat. Bowel sounds are normal. There is no distension.   Musculoskeletal:      Right lower leg: No edema.      Left lower leg: No edema.   Skin:     General: Skin is warm and dry.   Neurological:      Mental Status: She is alert. Mental status is at baseline.      Cranial Nerves: No cranial nerve deficit.      Motor: Weakness present.   Psychiatric:         Mood and Affect: Mood normal.         Behavior: Behavior normal.         Assessment/Plan   Diagnoses and all orders for this visit:  Atrial fibrillation, unspecified type (Multi)  Primary hypertension  Displaced fracture of medial condyle of right femur, initial encounter for closed fracture  Effusion of right knee  Unable to ambulate      Patient seen and examined at bedside.  Overall medically stable continue optimization with physical  therapy.  Will need to see how patient does with activities of daily living and overall level of functioning.  May require higher level of need moving forwards such as assisted living or long-term care.  Otherwise, no additional issues at this time.  Continue supportive care discussed care plan with staff.     Reviewed and approved by RICHARD CAMARA on 3/1/25 at 1:53 PM.

## 2025-03-05 ENCOUNTER — NURSING HOME VISIT (OUTPATIENT)
Dept: POST ACUTE CARE | Facility: EXTERNAL LOCATION | Age: OVER 89
End: 2025-03-05
Payer: MEDICARE

## 2025-03-05 DIAGNOSIS — R26.2 UNABLE TO AMBULATE: ICD-10-CM

## 2025-03-05 DIAGNOSIS — S72.431A DISPLACED FRACTURE OF MEDIAL CONDYLE OF RIGHT FEMUR, INITIAL ENCOUNTER FOR CLOSED FRACTURE: ICD-10-CM

## 2025-03-05 DIAGNOSIS — I48.91 ATRIAL FIBRILLATION, UNSPECIFIED TYPE (MULTI): Primary | ICD-10-CM

## 2025-03-05 DIAGNOSIS — I10 PRIMARY HYPERTENSION: ICD-10-CM

## 2025-03-05 DIAGNOSIS — K21.9 GASTROESOPHAGEAL REFLUX DISEASE WITHOUT ESOPHAGITIS: ICD-10-CM

## 2025-03-05 PROCEDURE — 99308 SBSQ NF CARE LOW MDM 20: CPT | Performed by: STUDENT IN AN ORGANIZED HEALTH CARE EDUCATION/TRAINING PROGRAM

## 2025-03-05 ASSESSMENT — ENCOUNTER SYMPTOMS
MUSCULOSKELETAL NEGATIVE: 1
GASTROINTESTINAL NEGATIVE: 1
RESPIRATORY NEGATIVE: 1
CARDIOVASCULAR NEGATIVE: 1
PSYCHIATRIC NEGATIVE: 1
WEAKNESS: 1
FATIGUE: 1

## 2025-03-05 NOTE — LETTER
Patient: Noemí Hinton  : 1928    Encounter Date: 2025    Subjective  Patient ID: Noemí Hinton is a 89 y.o. female.    Patient seen and examined on routine evaluation, regards that she is overall doing well and slowly improving with physical therapy, states that her pain is overall well-controlled still having difficulty with transferring but is slowly improving.  Otherwise, no recent falls as of late, states no additional issues at this time.         Review of Systems   Constitutional:  Positive for fatigue.   HENT: Negative.     Respiratory: Negative.     Cardiovascular: Negative.    Gastrointestinal: Negative.    Musculoskeletal: Negative.    Neurological:  Positive for weakness.   Psychiatric/Behavioral: Negative.         ObjectiveVitals Reviewed via facility EMR   Physical Exam  Constitutional:       General: She is not in acute distress.     Appearance: She is not ill-appearing.   Eyes:      Pupils: Pupils are equal, round, and reactive to light.   Cardiovascular:      Rate and Rhythm: Normal rate and regular rhythm.      Pulses: Normal pulses.      Heart sounds: No murmur heard.  Pulmonary:      Effort: No respiratory distress.      Breath sounds: No wheezing.   Abdominal:      General: Abdomen is flat. Bowel sounds are normal. There is no distension.   Musculoskeletal:      Right lower leg: No edema.      Left lower leg: No edema.   Skin:     General: Skin is warm and dry.   Neurological:      Mental Status: She is alert. Mental status is at baseline.      Cranial Nerves: No cranial nerve deficit.      Motor: Weakness present.   Psychiatric:         Mood and Affect: Mood normal.         Behavior: Behavior normal.         Assessment/Plan  Diagnoses and all orders for this visit:  Atrial fibrillation, unspecified type (Multi)  Primary hypertension  Gastroesophageal reflux disease without esophagitis  Unable to ambulate  Displaced fracture of medial condyle of right femur, initial encounter for  closed fracture    Patient seen and examined at bedside.  Overall medically stable, continue optimization with PT OT.  Ultimately, patient is making slow gains with therapy may benefit from assisted living or long-term care moving forward.  Otherwise closely monitor labs and vitals continue supportive care.     Reviewed and approved by TALON CAMARA on 3/5/25 at 8:29 PM.       Electronically Signed By: Talon Camara DO   3/5/25  8:29 PM

## 2025-03-06 NOTE — PROGRESS NOTES
Subjective   Patient ID: Noemí Hinton is a 89 y.o. female.    Patient seen and examined on routine evaluation, regards that she is overall doing well and slowly improving with physical therapy, states that her pain is overall well-controlled still having difficulty with transferring but is slowly improving.  Otherwise, no recent falls as of late, states no additional issues at this time.         Review of Systems   Constitutional:  Positive for fatigue.   HENT: Negative.     Respiratory: Negative.     Cardiovascular: Negative.    Gastrointestinal: Negative.    Musculoskeletal: Negative.    Neurological:  Positive for weakness.   Psychiatric/Behavioral: Negative.         Objective Vitals Reviewed via facility EMR   Physical Exam  Constitutional:       General: She is not in acute distress.     Appearance: She is not ill-appearing.   Eyes:      Pupils: Pupils are equal, round, and reactive to light.   Cardiovascular:      Rate and Rhythm: Normal rate and regular rhythm.      Pulses: Normal pulses.      Heart sounds: No murmur heard.  Pulmonary:      Effort: No respiratory distress.      Breath sounds: No wheezing.   Abdominal:      General: Abdomen is flat. Bowel sounds are normal. There is no distension.   Musculoskeletal:      Right lower leg: No edema.      Left lower leg: No edema.   Skin:     General: Skin is warm and dry.   Neurological:      Mental Status: She is alert. Mental status is at baseline.      Cranial Nerves: No cranial nerve deficit.      Motor: Weakness present.   Psychiatric:         Mood and Affect: Mood normal.         Behavior: Behavior normal.         Assessment/Plan   Diagnoses and all orders for this visit:  Atrial fibrillation, unspecified type (Multi)  Primary hypertension  Gastroesophageal reflux disease without esophagitis  Unable to ambulate  Displaced fracture of medial condyle of right femur, initial encounter for closed fracture    Patient seen and examined at bedside.  Overall  medically stable, continue optimization with PT OT.  Ultimately, patient is making slow gains with therapy may benefit from assisted living or long-term care moving forward.  Otherwise closely monitor labs and vitals continue supportive care.     Reviewed and approved by RICHARD CAMARA on 3/5/25 at 8:29 PM.

## 2025-03-12 ENCOUNTER — NURSING HOME VISIT (OUTPATIENT)
Dept: POST ACUTE CARE | Facility: EXTERNAL LOCATION | Age: OVER 89
End: 2025-03-12
Payer: MEDICARE

## 2025-03-12 DIAGNOSIS — I25.83 CORONARY ARTERY DISEASE DUE TO LIPID RICH PLAQUE: ICD-10-CM

## 2025-03-12 DIAGNOSIS — I25.10 CORONARY ARTERY DISEASE DUE TO LIPID RICH PLAQUE: ICD-10-CM

## 2025-03-12 DIAGNOSIS — K21.9 GASTROESOPHAGEAL REFLUX DISEASE WITHOUT ESOPHAGITIS: ICD-10-CM

## 2025-03-12 DIAGNOSIS — I48.91 ATRIAL FIBRILLATION, UNSPECIFIED TYPE (MULTI): Primary | ICD-10-CM

## 2025-03-12 DIAGNOSIS — R26.2 UNABLE TO AMBULATE: ICD-10-CM

## 2025-03-12 DIAGNOSIS — I10 PRIMARY HYPERTENSION: ICD-10-CM

## 2025-03-12 DIAGNOSIS — S72.431A DISPLACED FRACTURE OF MEDIAL CONDYLE OF RIGHT FEMUR, INITIAL ENCOUNTER FOR CLOSED FRACTURE: ICD-10-CM

## 2025-03-12 PROCEDURE — 99308 SBSQ NF CARE LOW MDM 20: CPT | Performed by: STUDENT IN AN ORGANIZED HEALTH CARE EDUCATION/TRAINING PROGRAM

## 2025-03-12 NOTE — LETTER
Patient: Noemí Hinton  : 1928    Encounter Date: 2025    Subjective  Patient ID: Noemí Hinton is a 89 y.o. female.    Patient seen and examined on routine evaluation.  Regards that she is overall doing well, but still having some difficulty with transfers.  Her pain is overall well-controlled and she regards no issues with regards to her pain and does not require any pain medications very frequently.  Otherwise, states no additional issues or concerns at this time and feels overall well.         Review of Systems   Constitutional: Negative.    HENT: Negative.     Respiratory: Negative.     Cardiovascular: Negative.    Gastrointestinal: Negative.    Musculoskeletal: Negative.    Neurological:  Positive for weakness.   Psychiatric/Behavioral: Negative.         ObjectiveVitals Reviewed via facility EMR   Physical Exam  Constitutional:       General: She is not in acute distress.     Appearance: She is not ill-appearing.      Comments: Elderly female, in bed, pleasant   Eyes:      Pupils: Pupils are equal, round, and reactive to light.   Cardiovascular:      Rate and Rhythm: Normal rate and regular rhythm.      Pulses: Normal pulses.      Heart sounds: No murmur heard.  Pulmonary:      Effort: No respiratory distress.      Breath sounds: No wheezing.   Abdominal:      General: Abdomen is flat. Bowel sounds are normal. There is no distension.   Musculoskeletal:      Right lower leg: No edema.      Left lower leg: No edema.   Skin:     General: Skin is warm and dry.   Neurological:      Mental Status: She is alert. Mental status is at baseline.      Cranial Nerves: No cranial nerve deficit.      Motor: No weakness.   Psychiatric:         Mood and Affect: Mood normal.         Behavior: Behavior normal.         Assessment/Plan  Diagnoses and all orders for this visit:  Atrial fibrillation, unspecified type (Multi)  Primary hypertension  Coronary artery disease due to lipid rich plaque  Gastroesophageal  reflux disease without esophagitis  Unable to ambulate  Displaced fracture of medial condyle of right femur, initial encounter for closed fracture      Patient seen and examined at bedside.  Overall medically stable, continue optimization of physical therapy.  Unfortunately, I do suspect that the patient has overall plateaued with regards to level of functioning with therapy.  Otherwise, likely would benefit from long-term care moving forward due to a significant gait since the patient requires.  Otherwise no additional issues or concerns at this time.     Reviewed and approved by TALON CAMARA on 3/14/25 at 9:05 PM.       Electronically Signed By: Talon Camara DO   3/14/25  9:05 PM

## 2025-03-13 ENCOUNTER — HOSPITAL ENCOUNTER (OUTPATIENT)
Dept: RADIOLOGY | Facility: CLINIC | Age: OVER 89
Discharge: HOME | End: 2025-03-13
Payer: MEDICARE

## 2025-03-13 ENCOUNTER — OFFICE VISIT (OUTPATIENT)
Dept: ORTHOPEDIC SURGERY | Facility: CLINIC | Age: OVER 89
End: 2025-03-13
Payer: MEDICARE

## 2025-03-13 DIAGNOSIS — S72.431A DISPLACED FRACTURE OF MEDIAL CONDYLE OF RIGHT FEMUR, INITIAL ENCOUNTER FOR CLOSED FRACTURE: ICD-10-CM

## 2025-03-13 PROCEDURE — 73560 X-RAY EXAM OF KNEE 1 OR 2: CPT | Mod: RT

## 2025-03-13 PROCEDURE — 99211 OFF/OP EST MAY X REQ PHY/QHP: CPT | Performed by: ORTHOPAEDIC SURGERY

## 2025-03-13 NOTE — PROGRESS NOTES
History of Present Illness:   Patient with known right medial femoral condyle fracture returns today for repeat evaluation.  She endorses minimal pain at rest but has significant pain with any motion or ambulation.    Review of Systems   GENERAL: Negative for malaise, significant weight loss, fever  MUSCULOSKELETAL: see HPI  NEURO:  Negative    Physical Examination:  Right knee  Trace effusion  Pain with varus valgus stress  Pain with range of motion    Imaging:  No evidence of displacement mild consolidation noted    Assessment:   Patient with a fracture of the right medial femoral condyle, nonsurgical management    Plan:  Discussed 3 weeks of additional nonweightbearing, then okay to weight-bear as tolerated.  Okay for touchdown weightbearing for transfers.  New knee brace placed today  Okay for range of motion and institution of physical therapy  Follow-up 3 weeks with x-rays of knee

## 2025-03-14 ASSESSMENT — ENCOUNTER SYMPTOMS
RESPIRATORY NEGATIVE: 1
MUSCULOSKELETAL NEGATIVE: 1
CARDIOVASCULAR NEGATIVE: 1
GASTROINTESTINAL NEGATIVE: 1
WEAKNESS: 1
CONSTITUTIONAL NEGATIVE: 1
PSYCHIATRIC NEGATIVE: 1

## 2025-03-15 NOTE — PROGRESS NOTES
Subjective   Patient ID: Noemí Hinton is a 89 y.o. female.    Patient seen and examined on routine evaluation.  Regards that she is overall doing well, but still having some difficulty with transfers.  Her pain is overall well-controlled and she regards no issues with regards to her pain and does not require any pain medications very frequently.  Otherwise, states no additional issues or concerns at this time and feels overall well.         Review of Systems   Constitutional: Negative.    HENT: Negative.     Respiratory: Negative.     Cardiovascular: Negative.    Gastrointestinal: Negative.    Musculoskeletal: Negative.    Neurological:  Positive for weakness.   Psychiatric/Behavioral: Negative.         Objective Vitals Reviewed via facility EMR   Physical Exam  Constitutional:       General: She is not in acute distress.     Appearance: She is not ill-appearing.      Comments: Elderly female, in bed, pleasant   Eyes:      Pupils: Pupils are equal, round, and reactive to light.   Cardiovascular:      Rate and Rhythm: Normal rate and regular rhythm.      Pulses: Normal pulses.      Heart sounds: No murmur heard.  Pulmonary:      Effort: No respiratory distress.      Breath sounds: No wheezing.   Abdominal:      General: Abdomen is flat. Bowel sounds are normal. There is no distension.   Musculoskeletal:      Right lower leg: No edema.      Left lower leg: No edema.   Skin:     General: Skin is warm and dry.   Neurological:      Mental Status: She is alert. Mental status is at baseline.      Cranial Nerves: No cranial nerve deficit.      Motor: No weakness.   Psychiatric:         Mood and Affect: Mood normal.         Behavior: Behavior normal.         Assessment/Plan   Diagnoses and all orders for this visit:  Atrial fibrillation, unspecified type (Multi)  Primary hypertension  Coronary artery disease due to lipid rich plaque  Gastroesophageal reflux disease without esophagitis  Unable to ambulate  Displaced fracture  of medial condyle of right femur, initial encounter for closed fracture      Patient seen and examined at bedside.  Overall medically stable, continue optimization of physical therapy.  Unfortunately, I do suspect that the patient has overall plateaued with regards to level of functioning with therapy.  Otherwise, likely would benefit from long-term care moving forward due to a significant gait since the patient requires.  Otherwise no additional issues or concerns at this time.     Reviewed and approved by RICHARD CAMARA on 3/14/25 at 9:05 PM.

## 2025-03-19 ENCOUNTER — NURSING HOME VISIT (OUTPATIENT)
Dept: POST ACUTE CARE | Facility: EXTERNAL LOCATION | Age: OVER 89
End: 2025-03-19
Payer: MEDICARE

## 2025-03-19 DIAGNOSIS — R26.2 UNABLE TO AMBULATE: ICD-10-CM

## 2025-03-19 DIAGNOSIS — S72.431A DISPLACED FRACTURE OF MEDIAL CONDYLE OF RIGHT FEMUR, INITIAL ENCOUNTER FOR CLOSED FRACTURE: ICD-10-CM

## 2025-03-19 DIAGNOSIS — I50.9 CONGESTIVE HEART FAILURE, UNSPECIFIED HF CHRONICITY, UNSPECIFIED HEART FAILURE TYPE: Primary | ICD-10-CM

## 2025-03-19 DIAGNOSIS — I25.83 CORONARY ARTERY DISEASE DUE TO LIPID RICH PLAQUE: ICD-10-CM

## 2025-03-19 DIAGNOSIS — I25.10 CORONARY ARTERY DISEASE DUE TO LIPID RICH PLAQUE: ICD-10-CM

## 2025-03-19 DIAGNOSIS — N18.31 STAGE 3A CHRONIC KIDNEY DISEASE (MULTI): ICD-10-CM

## 2025-03-19 PROCEDURE — 99308 SBSQ NF CARE LOW MDM 20: CPT | Performed by: STUDENT IN AN ORGANIZED HEALTH CARE EDUCATION/TRAINING PROGRAM

## 2025-03-19 ASSESSMENT — ENCOUNTER SYMPTOMS
CARDIOVASCULAR NEGATIVE: 1
GASTROINTESTINAL NEGATIVE: 1
PSYCHIATRIC NEGATIVE: 1
ARTHRALGIAS: 1
WEAKNESS: 1
FATIGUE: 1
RESPIRATORY NEGATIVE: 1

## 2025-03-19 NOTE — LETTER
Patient: Noemí Hinton  : 1928    Encounter Date: 2025    Subjective  Patient ID: Noemí Hinton is a 89 y.o. female.    Patient seen and examined on routine evaluation, endorses that she overall is doing well, however does feel significantly weak on exam.  She endorses that she is having difficulty with transferring, and has been making minimal improvement with physical therapy.  Otherwise, endorses no acute issues or concerns and overall feels well.         Review of Systems   Constitutional:  Positive for fatigue.   HENT: Negative.     Respiratory: Negative.     Cardiovascular: Negative.    Gastrointestinal: Negative.    Musculoskeletal:  Positive for arthralgias.   Neurological:  Positive for weakness.   Psychiatric/Behavioral: Negative.         ObjectiveVitals Reviewed via facility EMR   Physical Exam  Constitutional:       General: She is not in acute distress.     Appearance: She is not ill-appearing.   Eyes:      Pupils: Pupils are equal, round, and reactive to light.   Cardiovascular:      Rate and Rhythm: Normal rate and regular rhythm.      Pulses: Normal pulses.      Heart sounds: No murmur heard.  Pulmonary:      Effort: No respiratory distress.      Breath sounds: No wheezing.   Abdominal:      General: Abdomen is flat. Bowel sounds are normal. There is no distension.   Musculoskeletal:         General: Tenderness present.      Right lower leg: No edema.      Left lower leg: No edema.      Comments: Dec rom, brace in place   Skin:     General: Skin is warm and dry.   Neurological:      Mental Status: She is alert. Mental status is at baseline.      Cranial Nerves: No cranial nerve deficit.      Motor: Weakness present.   Psychiatric:         Mood and Affect: Mood normal.         Behavior: Behavior normal.         Assessment/Plan  There are no diagnoses linked to this encounter.    Patient seen and examined at bedside.  Overall medically stable but as per HPI, has been making minimal  improvement with physical therapy but unfortunately has been nwb per ortho due to healing process. Appreciate ortho recs, likely will need agressive therapy moving forward but I do suspect that patient could have reached maximum level of functioning at this time. Likely would benefit from transition to long-term care.  Otherwise, no additional issues or concerns at this time.  Pain is overall well-controlled, continue supportive care.     Reviewed and approved by TALON CAMARA on 3/19/25 at 11:00 PM.       Electronically Signed By: Talon Camara DO   3/19/25 11:01 PM

## 2025-03-20 NOTE — PROGRESS NOTES
Subjective   Patient ID: Noemí Hinton is a 89 y.o. female.    Patient seen and examined on routine evaluation, endorses that she overall is doing well, however does feel significantly weak on exam.  She endorses that she is having difficulty with transferring, and has been making minimal improvement with physical therapy.  Otherwise, endorses no acute issues or concerns and overall feels well.         Review of Systems   Constitutional:  Positive for fatigue.   HENT: Negative.     Respiratory: Negative.     Cardiovascular: Negative.    Gastrointestinal: Negative.    Musculoskeletal:  Positive for arthralgias.   Neurological:  Positive for weakness.   Psychiatric/Behavioral: Negative.         Objective Vitals Reviewed via facility EMR   Physical Exam  Constitutional:       General: She is not in acute distress.     Appearance: She is not ill-appearing.   Eyes:      Pupils: Pupils are equal, round, and reactive to light.   Cardiovascular:      Rate and Rhythm: Normal rate and regular rhythm.      Pulses: Normal pulses.      Heart sounds: No murmur heard.  Pulmonary:      Effort: No respiratory distress.      Breath sounds: No wheezing.   Abdominal:      General: Abdomen is flat. Bowel sounds are normal. There is no distension.   Musculoskeletal:         General: Tenderness present.      Right lower leg: No edema.      Left lower leg: No edema.      Comments: Dec rom, brace in place   Skin:     General: Skin is warm and dry.   Neurological:      Mental Status: She is alert. Mental status is at baseline.      Cranial Nerves: No cranial nerve deficit.      Motor: Weakness present.   Psychiatric:         Mood and Affect: Mood normal.         Behavior: Behavior normal.         Assessment/Plan   There are no diagnoses linked to this encounter.    Patient seen and examined at bedside.  Overall medically stable but as per HPI, has been making minimal improvement with physical therapy but unfortunately has been nwb per ortho  due to healing process. Appreciate ortho recs, likely will need agressive therapy moving forward but I do suspect that patient could have reached maximum level of functioning at this time. Likely would benefit from transition to long-term care.  Otherwise, no additional issues or concerns at this time.  Pain is overall well-controlled, continue supportive care.     Reviewed and approved by RICHARD CAMARA on 3/19/25 at 11:00 PM.

## 2025-03-25 LAB
ATRIAL RATE: 56 BPM
P OFFSET: 186 MS
P ONSET: 137 MS
PR INTERVAL: 158 MS
Q ONSET: 216 MS
QRS COUNT: 9 BEATS
QRS DURATION: 80 MS
QT INTERVAL: 450 MS
QTC CALCULATION(BAZETT): 434 MS
QTC FREDERICIA: 440 MS
R AXIS: 183 DEGREES
T AXIS: 149 DEGREES
T OFFSET: 441 MS
VENTRICULAR RATE: 56 BPM

## 2025-03-26 ENCOUNTER — NURSING HOME VISIT (OUTPATIENT)
Dept: POST ACUTE CARE | Facility: EXTERNAL LOCATION | Age: OVER 89
End: 2025-03-26
Payer: MEDICARE

## 2025-03-26 DIAGNOSIS — I10 PRIMARY HYPERTENSION: ICD-10-CM

## 2025-03-26 DIAGNOSIS — N18.31 STAGE 3A CHRONIC KIDNEY DISEASE (MULTI): ICD-10-CM

## 2025-03-26 DIAGNOSIS — S72.431A DISPLACED FRACTURE OF MEDIAL CONDYLE OF RIGHT FEMUR, INITIAL ENCOUNTER FOR CLOSED FRACTURE: ICD-10-CM

## 2025-03-26 DIAGNOSIS — I48.91 ATRIAL FIBRILLATION, UNSPECIFIED TYPE (MULTI): Primary | ICD-10-CM

## 2025-03-26 DIAGNOSIS — G62.9 NEUROPATHY: ICD-10-CM

## 2025-03-26 PROCEDURE — 99309 SBSQ NF CARE MODERATE MDM 30: CPT | Performed by: STUDENT IN AN ORGANIZED HEALTH CARE EDUCATION/TRAINING PROGRAM

## 2025-03-26 ASSESSMENT — ENCOUNTER SYMPTOMS
RESPIRATORY NEGATIVE: 1
PSYCHIATRIC NEGATIVE: 1
GASTROINTESTINAL NEGATIVE: 1
NUMBNESS: 1
WEAKNESS: 1
CARDIOVASCULAR NEGATIVE: 1
MUSCULOSKELETAL NEGATIVE: 1
CONSTITUTIONAL NEGATIVE: 1

## 2025-03-26 NOTE — LETTER
Patient: Noemí Hinton  : 1928    Encounter Date: 2025    Subjective  Patient ID: Noemí Hinton is a 89 y.o. female.    Patient seen and examined.  Records that she is overall doing well, intermittently getting episodes of underlying sleep.  This started as of late and is causing some significant pain in her lower extremities.  She has been to the medications without much improvement.  Otherwise, states no additional issues or concerns at this time and overall feels well.         Review of Systems   Constitutional: Negative.    HENT: Negative.     Respiratory: Negative.     Cardiovascular: Negative.    Gastrointestinal: Negative.    Musculoskeletal: Negative.    Neurological:  Positive for weakness and numbness.        Neuropathy, pain   Psychiatric/Behavioral: Negative.         ObjectiveVitals Reviewed via facility EMR   Physical Exam  Constitutional:       General: She is not in acute distress.     Appearance: She is not ill-appearing.   Eyes:      Pupils: Pupils are equal, round, and reactive to light.   Cardiovascular:      Rate and Rhythm: Normal rate and regular rhythm.      Pulses: Normal pulses.      Heart sounds: No murmur heard.  Pulmonary:      Effort: No respiratory distress.      Breath sounds: No wheezing.   Abdominal:      General: Abdomen is flat. Bowel sounds are normal. There is no distension.   Musculoskeletal:      Right lower leg: No edema.      Left lower leg: No edema.      Comments: Neuropathy, agitated secondary to this   Skin:     General: Skin is warm and dry.   Neurological:      Mental Status: She is alert. Mental status is at baseline.      Cranial Nerves: No cranial nerve deficit.      Motor: Weakness present.   Psychiatric:         Mood and Affect: Mood normal.         Behavior: Behavior normal.         Assessment/Plan  Diagnoses and all orders for this visit:  Atrial fibrillation, unspecified type (Multi)  Primary hypertension  Stage 3a chronic kidney disease  (Multi)  Displaced fracture of medial condyle of right femur, initial encounter for closed fracture  Neuropathy    Patient seen and examined at bedside.  Overall medically stable, we will start low-dose gabapentin twice daily.  In addition, we will initiate workup for neuropathy with B12 folate iron studies TSH and CMP.  Continue optimization of activities daily living, discussed plan with staff     Reviewed and approved by TALON CAMARA on 3/26/25 at 10:22 PM.       Electronically Signed By: Talon Camara DO   3/26/25 10:23 PM

## 2025-03-27 NOTE — PROGRESS NOTES
Subjective   Patient ID: Noemí Hinton is a 89 y.o. female.    Patient seen and examined.  Records that she is overall doing well, intermittently getting episodes of underlying sleep.  This started as of late and is causing some significant pain in her lower extremities.  She has been to the medications without much improvement.  Otherwise, states no additional issues or concerns at this time and overall feels well.         Review of Systems   Constitutional: Negative.    HENT: Negative.     Respiratory: Negative.     Cardiovascular: Negative.    Gastrointestinal: Negative.    Musculoskeletal: Negative.    Neurological:  Positive for weakness and numbness.        Neuropathy, pain   Psychiatric/Behavioral: Negative.         Objective Vitals Reviewed via facility EMR   Physical Exam  Constitutional:       General: She is not in acute distress.     Appearance: She is not ill-appearing.   Eyes:      Pupils: Pupils are equal, round, and reactive to light.   Cardiovascular:      Rate and Rhythm: Normal rate and regular rhythm.      Pulses: Normal pulses.      Heart sounds: No murmur heard.  Pulmonary:      Effort: No respiratory distress.      Breath sounds: No wheezing.   Abdominal:      General: Abdomen is flat. Bowel sounds are normal. There is no distension.   Musculoskeletal:      Right lower leg: No edema.      Left lower leg: No edema.      Comments: Neuropathy, agitated secondary to this   Skin:     General: Skin is warm and dry.   Neurological:      Mental Status: She is alert. Mental status is at baseline.      Cranial Nerves: No cranial nerve deficit.      Motor: Weakness present.   Psychiatric:         Mood and Affect: Mood normal.         Behavior: Behavior normal.         Assessment/Plan   Diagnoses and all orders for this visit:  Atrial fibrillation, unspecified type (Multi)  Primary hypertension  Stage 3a chronic kidney disease (Multi)  Displaced fracture of medial condyle of right femur, initial encounter  for closed fracture  Neuropathy    Patient seen and examined at bedside.  Overall medically stable, we will start low-dose gabapentin twice daily.  In addition, we will initiate workup for neuropathy with B12 folate iron studies TSH and CMP.  Continue optimization of activities daily living, discussed plan with staff     Reviewed and approved by RICHARD CAMARA on 3/26/25 at 10:22 PM.

## 2025-04-02 ENCOUNTER — HOSPITAL ENCOUNTER (OUTPATIENT)
Dept: RADIOLOGY | Facility: CLINIC | Age: OVER 89
Discharge: HOME | End: 2025-04-02
Payer: MEDICARE

## 2025-04-02 ENCOUNTER — NURSING HOME VISIT (OUTPATIENT)
Dept: POST ACUTE CARE | Facility: EXTERNAL LOCATION | Age: OVER 89
End: 2025-04-02

## 2025-04-02 ENCOUNTER — OFFICE VISIT (OUTPATIENT)
Dept: ORTHOPEDIC SURGERY | Facility: CLINIC | Age: OVER 89
End: 2025-04-02
Payer: MEDICARE

## 2025-04-02 DIAGNOSIS — I48.91 ATRIAL FIBRILLATION, UNSPECIFIED TYPE (MULTI): Primary | ICD-10-CM

## 2025-04-02 DIAGNOSIS — S72.431A DISPLACED FRACTURE OF MEDIAL CONDYLE OF RIGHT FEMUR, INITIAL ENCOUNTER FOR CLOSED FRACTURE: ICD-10-CM

## 2025-04-02 DIAGNOSIS — I10 PRIMARY HYPERTENSION: ICD-10-CM

## 2025-04-02 DIAGNOSIS — I50.9 CONGESTIVE HEART FAILURE, UNSPECIFIED HF CHRONICITY, UNSPECIFIED HEART FAILURE TYPE: ICD-10-CM

## 2025-04-02 DIAGNOSIS — S72.431A DISPLACED FRACTURE OF MEDIAL CONDYLE OF RIGHT FEMUR, INITIAL ENCOUNTER FOR CLOSED FRACTURE: Primary | ICD-10-CM

## 2025-04-02 DIAGNOSIS — R07.81 RIB PAIN: ICD-10-CM

## 2025-04-02 PROCEDURE — 99308 SBSQ NF CARE LOW MDM 20: CPT | Performed by: STUDENT IN AN ORGANIZED HEALTH CARE EDUCATION/TRAINING PROGRAM

## 2025-04-02 PROCEDURE — 99211 OFF/OP EST MAY X REQ PHY/QHP: CPT | Performed by: ORTHOPAEDIC SURGERY

## 2025-04-02 PROCEDURE — 73560 X-RAY EXAM OF KNEE 1 OR 2: CPT | Mod: RT

## 2025-04-02 PROCEDURE — 1157F ADVNC CARE PLAN IN RCRD: CPT | Performed by: ORTHOPAEDIC SURGERY

## 2025-04-02 PROCEDURE — 99024 POSTOP FOLLOW-UP VISIT: CPT | Performed by: ORTHOPAEDIC SURGERY

## 2025-04-02 NOTE — LETTER
Patient: Noemí Hinton  : 1928    Encounter Date: 2025    Subjective  Patient ID: Noemí Hinton is a 89 y.o. female.    Patient seen and examined on routine evaluation.  She regards that she is overall doing well and did recently see orthopedics.  States that she was given the okay for weightbearing, and able to take off her brace.  She reports that this is significantly helping her range of motion.  In addition, she endorses that she did have some rib pain.  X-ray was unremarkable, however lidocaine patches do seem to be helping.  Otherwise, endorses no acute issues or concerns and overall feels well.         Review of Systems   Constitutional:  Positive for fatigue.   HENT: Negative.     Respiratory: Negative.     Cardiovascular: Negative.    Gastrointestinal: Negative.    Musculoskeletal: Negative.    Neurological:  Positive for weakness.   Psychiatric/Behavioral: Negative.         ObjectiveVitals Reviewed via facility EMR   Physical Exam  Constitutional:       General: She is not in acute distress.     Appearance: She is not ill-appearing.      Comments: Improved rom with removal of brace   Eyes:      Pupils: Pupils are equal, round, and reactive to light.   Cardiovascular:      Rate and Rhythm: Normal rate and regular rhythm.      Pulses: Normal pulses.      Heart sounds: No murmur heard.  Pulmonary:      Effort: No respiratory distress.      Breath sounds: No wheezing.   Abdominal:      General: Abdomen is flat. Bowel sounds are normal. There is no distension.   Musculoskeletal:      Right lower leg: No edema.      Left lower leg: No edema.   Skin:     General: Skin is warm and dry.   Neurological:      Mental Status: She is alert. Mental status is at baseline.      Cranial Nerves: No cranial nerve deficit.      Motor: Weakness present.   Psychiatric:         Mood and Affect: Mood normal.         Behavior: Behavior normal.         Assessment/Plan  Diagnoses and all orders for this visit:  Atrial  fibrillation, unspecified type (Multi)  Congestive heart failure, unspecified HF chronicity, unspecified heart failure type  Primary hypertension  Displaced fracture of medial condyle of right femur, initial encounter for closed fracture  Rib pain    Patient seen and examined at bedside.  Recent orthopedic recommendations reviewed, encouraged weightbearing as tolerated, continue supportive care and continue optimization with PT OT.  Suspect underlying musculoskeletal pain with regards to rib pain however will continue to closely monitor.  Otherwise continue supportive care, discussed care plan with staff and no issues noted.     Reviewed and approved by TALON CAMARA on 4/3/25 at 9:00 PM.       Electronically Signed By: Talon Camara DO   4/3/25  9:00 PM

## 2025-04-02 NOTE — PROGRESS NOTES
History of Present Illness  Patient returns today for evaluation of her right leg.  The patient notes improvement in pain.  The patient denies any significant numbness or tingling of calf pain.  She has been wearing her brace.    Physical Examination:  Right knee:  Skin healthy and intact  Swelling noted, mild  Tenderness: mild   Mild valgus deformity noted  Intact flexion and extension of digits  Neurovascular exam normal distally  2+ dorsalis pedis pulse and good cap refill    Radiographs  Demonstrate a healing medial femoral condyle fracture    Assessment:  Patient with a right medial femoral condyle fracture    Plan:   Immobilization: Brace when ambulating, locked in extension or 30 degrees of flexion based on patient comfort    Brace off for range of motion    Weightbearing as tolerated    Reviewed rehab /return to activities  Follow up 1 month

## 2025-04-03 PROBLEM — R07.81 RIB PAIN: Status: ACTIVE | Noted: 2025-04-03

## 2025-04-03 ASSESSMENT — ENCOUNTER SYMPTOMS
PSYCHIATRIC NEGATIVE: 1
WEAKNESS: 1
RESPIRATORY NEGATIVE: 1
CARDIOVASCULAR NEGATIVE: 1
FATIGUE: 1
MUSCULOSKELETAL NEGATIVE: 1
GASTROINTESTINAL NEGATIVE: 1

## 2025-04-04 NOTE — PROGRESS NOTES
Subjective   Patient ID: Noemí Hinton is a 89 y.o. female.    Patient seen and examined on routine evaluation.  She regards that she is overall doing well and did recently see orthopedics.  States that she was given the okay for weightbearing, and able to take off her brace.  She reports that this is significantly helping her range of motion.  In addition, she endorses that she did have some rib pain.  X-ray was unremarkable, however lidocaine patches do seem to be helping.  Otherwise, endorses no acute issues or concerns and overall feels well.         Review of Systems   Constitutional:  Positive for fatigue.   HENT: Negative.     Respiratory: Negative.     Cardiovascular: Negative.    Gastrointestinal: Negative.    Musculoskeletal: Negative.    Neurological:  Positive for weakness.   Psychiatric/Behavioral: Negative.         Objective Vitals Reviewed via facility EMR   Physical Exam  Constitutional:       General: She is not in acute distress.     Appearance: She is not ill-appearing.      Comments: Improved rom with removal of brace   Eyes:      Pupils: Pupils are equal, round, and reactive to light.   Cardiovascular:      Rate and Rhythm: Normal rate and regular rhythm.      Pulses: Normal pulses.      Heart sounds: No murmur heard.  Pulmonary:      Effort: No respiratory distress.      Breath sounds: No wheezing.   Abdominal:      General: Abdomen is flat. Bowel sounds are normal. There is no distension.   Musculoskeletal:      Right lower leg: No edema.      Left lower leg: No edema.   Skin:     General: Skin is warm and dry.   Neurological:      Mental Status: She is alert. Mental status is at baseline.      Cranial Nerves: No cranial nerve deficit.      Motor: Weakness present.   Psychiatric:         Mood and Affect: Mood normal.         Behavior: Behavior normal.         Assessment/Plan   Diagnoses and all orders for this visit:  Atrial fibrillation, unspecified type (Multi)  Congestive heart failure,  unspecified HF chronicity, unspecified heart failure type  Primary hypertension  Displaced fracture of medial condyle of right femur, initial encounter for closed fracture  Rib pain    Patient seen and examined at bedside.  Recent orthopedic recommendations reviewed, encouraged weightbearing as tolerated, continue supportive care and continue optimization with PT OT.  Suspect underlying musculoskeletal pain with regards to rib pain however will continue to closely monitor.  Otherwise continue supportive care, discussed care plan with staff and no issues noted.     Reviewed and approved by RICHARD CAMARA on 4/3/25 at 9:00 PM.

## 2025-04-09 ENCOUNTER — NURSING HOME VISIT (OUTPATIENT)
Dept: POST ACUTE CARE | Facility: EXTERNAL LOCATION | Age: OVER 89
End: 2025-04-09
Payer: MEDICARE

## 2025-04-09 DIAGNOSIS — I25.83 CORONARY ARTERY DISEASE DUE TO LIPID RICH PLAQUE: Primary | ICD-10-CM

## 2025-04-09 DIAGNOSIS — K21.9 GASTROESOPHAGEAL REFLUX DISEASE WITHOUT ESOPHAGITIS: ICD-10-CM

## 2025-04-09 DIAGNOSIS — S72.431A DISPLACED FRACTURE OF MEDIAL CONDYLE OF RIGHT FEMUR, INITIAL ENCOUNTER FOR CLOSED FRACTURE: ICD-10-CM

## 2025-04-09 DIAGNOSIS — R26.2 UNABLE TO AMBULATE: ICD-10-CM

## 2025-04-09 DIAGNOSIS — I50.9 CONGESTIVE HEART FAILURE, UNSPECIFIED HF CHRONICITY, UNSPECIFIED HEART FAILURE TYPE: ICD-10-CM

## 2025-04-09 DIAGNOSIS — I25.10 CORONARY ARTERY DISEASE DUE TO LIPID RICH PLAQUE: Primary | ICD-10-CM

## 2025-04-09 PROCEDURE — 99308 SBSQ NF CARE LOW MDM 20: CPT | Performed by: STUDENT IN AN ORGANIZED HEALTH CARE EDUCATION/TRAINING PROGRAM

## 2025-04-09 NOTE — LETTER
Patient: Noemí Hinton  : 1928    Encounter Date: 2025    Subjective  Patient ID: Noemí Hinton is a 89 y.o. female.    Patient seen and examined on routine evaluation.  Recently diagnosed with a UTI and antibiotics were recently changed to Cipro due to culture sensitivities.  Patient reports much improvement with regards to symptoms of her UTI.  Still having underlying rib pain but this is overall stable as well.  Otherwise, endorses no acute issues or concerns and overall feels well.  Slowly improving with physical therapy, now that her weightbearing status has been improved.  Otherwise no additional issues.         Review of Systems   Constitutional: Negative.    HENT: Negative.     Respiratory: Negative.     Cardiovascular: Negative.    Gastrointestinal: Negative.    Musculoskeletal: Negative.    Neurological: Negative.    Psychiatric/Behavioral: Negative.         ObjectiveVitals Reviewed via facility EMR   Physical Exam  Constitutional:       General: She is not in acute distress.     Appearance: She is not ill-appearing.   Eyes:      Pupils: Pupils are equal, round, and reactive to light.   Cardiovascular:      Rate and Rhythm: Normal rate and regular rhythm.      Pulses: Normal pulses.      Heart sounds: No murmur heard.  Pulmonary:      Effort: No respiratory distress.      Breath sounds: No wheezing.   Abdominal:      General: Abdomen is flat. Bowel sounds are normal. There is no distension.   Musculoskeletal:      Right lower leg: No edema.      Left lower leg: No edema.   Skin:     General: Skin is warm and dry.   Neurological:      Mental Status: She is alert. Mental status is at baseline.      Cranial Nerves: No cranial nerve deficit.      Motor: Weakness present.   Psychiatric:         Mood and Affect: Mood normal.         Behavior: Behavior normal.         Assessment/Plan  Diagnoses and all orders for this visit:  Coronary artery disease due to lipid rich plaque  Congestive heart  failure, unspecified HF chronicity, unspecified heart failure type  Gastroesophageal reflux disease without esophagitis  Displaced fracture of medial condyle of right femur, initial encounter for closed fracture  Unable to ambulate    Patient seen and examined on routine evaluation, continue optimization of PT OT, and continue to closely monitor for signs and symptoms of underlying UTI.  This seems to be improving with utilization of ciprofloxacin.  Otherwise encouraged continuation of PT OT and weightbearing status now that she has been progress by orthopedics, no additional issues at this time.     Reviewed and approved by TALON CAMARA on 4/10/25 at 8:54 PM.       Electronically Signed By: Talon Camara DO   4/10/25  8:54 PM

## 2025-04-10 ASSESSMENT — ENCOUNTER SYMPTOMS
NEUROLOGICAL NEGATIVE: 1
CARDIOVASCULAR NEGATIVE: 1
RESPIRATORY NEGATIVE: 1
PSYCHIATRIC NEGATIVE: 1
MUSCULOSKELETAL NEGATIVE: 1
CONSTITUTIONAL NEGATIVE: 1
GASTROINTESTINAL NEGATIVE: 1

## 2025-04-11 NOTE — PROGRESS NOTES
Subjective   Patient ID: Noemí Hinton is a 89 y.o. female.    Patient seen and examined on routine evaluation.  Recently diagnosed with a UTI and antibiotics were recently changed to Cipro due to culture sensitivities.  Patient reports much improvement with regards to symptoms of her UTI.  Still having underlying rib pain but this is overall stable as well.  Otherwise, endorses no acute issues or concerns and overall feels well.  Slowly improving with physical therapy, now that her weightbearing status has been improved.  Otherwise no additional issues.         Review of Systems   Constitutional: Negative.    HENT: Negative.     Respiratory: Negative.     Cardiovascular: Negative.    Gastrointestinal: Negative.    Musculoskeletal: Negative.    Neurological: Negative.    Psychiatric/Behavioral: Negative.         Objective Vitals Reviewed via facility EMR   Physical Exam  Constitutional:       General: She is not in acute distress.     Appearance: She is not ill-appearing.   Eyes:      Pupils: Pupils are equal, round, and reactive to light.   Cardiovascular:      Rate and Rhythm: Normal rate and regular rhythm.      Pulses: Normal pulses.      Heart sounds: No murmur heard.  Pulmonary:      Effort: No respiratory distress.      Breath sounds: No wheezing.   Abdominal:      General: Abdomen is flat. Bowel sounds are normal. There is no distension.   Musculoskeletal:      Right lower leg: No edema.      Left lower leg: No edema.   Skin:     General: Skin is warm and dry.   Neurological:      Mental Status: She is alert. Mental status is at baseline.      Cranial Nerves: No cranial nerve deficit.      Motor: Weakness present.   Psychiatric:         Mood and Affect: Mood normal.         Behavior: Behavior normal.         Assessment/Plan   Diagnoses and all orders for this visit:  Coronary artery disease due to lipid rich plaque  Congestive heart failure, unspecified HF chronicity, unspecified heart failure  type  Gastroesophageal reflux disease without esophagitis  Displaced fracture of medial condyle of right femur, initial encounter for closed fracture  Unable to ambulate    Patient seen and examined on routine evaluation, continue optimization of PT OT, and continue to closely monitor for signs and symptoms of underlying UTI.  This seems to be improving with utilization of ciprofloxacin.  Otherwise encouraged continuation of PT OT and weightbearing status now that she has been progress by orthopedics, no additional issues at this time.     Reviewed and approved by RICHARD CAMARA on 4/10/25 at 8:54 PM.

## 2025-04-16 ENCOUNTER — NURSING HOME VISIT (OUTPATIENT)
Dept: POST ACUTE CARE | Facility: EXTERNAL LOCATION | Age: OVER 89
End: 2025-04-16
Payer: MEDICARE

## 2025-04-16 DIAGNOSIS — K21.9 GASTROESOPHAGEAL REFLUX DISEASE WITHOUT ESOPHAGITIS: ICD-10-CM

## 2025-04-16 DIAGNOSIS — G62.9 NEUROPATHY: ICD-10-CM

## 2025-04-16 DIAGNOSIS — I10 PRIMARY HYPERTENSION: ICD-10-CM

## 2025-04-16 DIAGNOSIS — I50.9 CONGESTIVE HEART FAILURE, UNSPECIFIED HF CHRONICITY, UNSPECIFIED HEART FAILURE TYPE: Primary | ICD-10-CM

## 2025-04-16 DIAGNOSIS — N18.31 STAGE 3A CHRONIC KIDNEY DISEASE (MULTI): ICD-10-CM

## 2025-04-16 PROCEDURE — 99308 SBSQ NF CARE LOW MDM 20: CPT | Performed by: STUDENT IN AN ORGANIZED HEALTH CARE EDUCATION/TRAINING PROGRAM

## 2025-04-16 ASSESSMENT — ENCOUNTER SYMPTOMS
GASTROINTESTINAL NEGATIVE: 1
CARDIOVASCULAR NEGATIVE: 1
CONSTITUTIONAL NEGATIVE: 1
PSYCHIATRIC NEGATIVE: 1
MUSCULOSKELETAL NEGATIVE: 1
WEAKNESS: 1
RESPIRATORY NEGATIVE: 1

## 2025-04-16 NOTE — LETTER
Patient: Noemí Hinton  : 1928    Encounter Date: 2025    Subjective  Patient ID: Noemí Hinton is a 89 y.o. female.    Patient seen and examined on routine evaluation, she regards that she is overall doing well without any acute issues or concerns.  States that she was up at her bedside, and is actually started to ambulate within the hallways.  States that her pain is overall well-controlled.  Otherwise, endorses no acute issues or concerns.         Review of Systems   Constitutional: Negative.    HENT: Negative.     Respiratory: Negative.     Cardiovascular: Negative.    Gastrointestinal: Negative.    Musculoskeletal: Negative.    Neurological:  Positive for weakness.   Psychiatric/Behavioral: Negative.         ObjectiveVitals Reviewed via facility EMR   Physical Exam  Constitutional:       General: She is not in acute distress.     Appearance: She is not ill-appearing.      Comments: Significant improvement, sitting at side of bed, ready to stand, plesant   Eyes:      Pupils: Pupils are equal, round, and reactive to light.   Cardiovascular:      Rate and Rhythm: Normal rate and regular rhythm.      Pulses: Normal pulses.      Heart sounds: No murmur heard.  Pulmonary:      Effort: No respiratory distress.      Breath sounds: No wheezing.   Abdominal:      General: Abdomen is flat. Bowel sounds are normal. There is no distension.   Musculoskeletal:      Right lower leg: No edema.      Left lower leg: No edema.   Skin:     General: Skin is warm and dry.   Neurological:      Mental Status: She is alert. Mental status is at baseline.      Cranial Nerves: No cranial nerve deficit.      Motor: Weakness present.   Psychiatric:         Mood and Affect: Mood normal.         Behavior: Behavior normal.         Assessment/Plan  Diagnoses and all orders for this visit:  Congestive heart failure, unspecified HF chronicity, unspecified heart failure type  Primary hypertension  Stage 3a chronic kidney disease  (Multi)  Gastroesophageal reflux disease without esophagitis  Neuropathy    Patient seen and examined at bedside.  Overall medically stable continues to be optimized with regards to activities of daily living.  Appreciate PT OT recommendations does seem to be improving steadily. .  No change in medications at this time, continue supportive care.     Reviewed and approved by TALON CAMARA on 4/16/25 at 10:24 PM.       Electronically Signed By: Talon Camara DO   4/16/25 10:24 PM

## 2025-04-17 NOTE — PROGRESS NOTES
Subjective   Patient ID: Noemí Hinton is a 89 y.o. female.    Patient seen and examined on routine evaluation, she regards that she is overall doing well without any acute issues or concerns.  States that she was up at her bedside, and is actually started to ambulate within the hallways.  States that her pain is overall well-controlled.  Otherwise, endorses no acute issues or concerns.         Review of Systems   Constitutional: Negative.    HENT: Negative.     Respiratory: Negative.     Cardiovascular: Negative.    Gastrointestinal: Negative.    Musculoskeletal: Negative.    Neurological:  Positive for weakness.   Psychiatric/Behavioral: Negative.         Objective Vitals Reviewed via facility EMR   Physical Exam  Constitutional:       General: She is not in acute distress.     Appearance: She is not ill-appearing.      Comments: Significant improvement, sitting at side of bed, ready to stand, plesant   Eyes:      Pupils: Pupils are equal, round, and reactive to light.   Cardiovascular:      Rate and Rhythm: Normal rate and regular rhythm.      Pulses: Normal pulses.      Heart sounds: No murmur heard.  Pulmonary:      Effort: No respiratory distress.      Breath sounds: No wheezing.   Abdominal:      General: Abdomen is flat. Bowel sounds are normal. There is no distension.   Musculoskeletal:      Right lower leg: No edema.      Left lower leg: No edema.   Skin:     General: Skin is warm and dry.   Neurological:      Mental Status: She is alert. Mental status is at baseline.      Cranial Nerves: No cranial nerve deficit.      Motor: Weakness present.   Psychiatric:         Mood and Affect: Mood normal.         Behavior: Behavior normal.         Assessment/Plan   Diagnoses and all orders for this visit:  Congestive heart failure, unspecified HF chronicity, unspecified heart failure type  Primary hypertension  Stage 3a chronic kidney disease (Multi)  Gastroesophageal reflux disease without  esophagitis  Neuropathy    Patient seen and examined at bedside.  Overall medically stable continues to be optimized with regards to activities of daily living.  Appreciate PT OT recommendations does seem to be improving steadily. .  No change in medications at this time, continue supportive care.     Reviewed and approved by RICHARD CAMARA on 4/16/25 at 10:24 PM.

## 2025-04-18 ENCOUNTER — NURSING HOME VISIT (OUTPATIENT)
Dept: POST ACUTE CARE | Facility: EXTERNAL LOCATION | Age: OVER 89
End: 2025-04-18
Payer: MEDICARE

## 2025-04-18 DIAGNOSIS — N18.31 STAGE 3A CHRONIC KIDNEY DISEASE (MULTI): ICD-10-CM

## 2025-04-18 DIAGNOSIS — I48.91 ATRIAL FIBRILLATION, UNSPECIFIED TYPE (MULTI): ICD-10-CM

## 2025-04-18 DIAGNOSIS — K21.9 GASTROESOPHAGEAL REFLUX DISEASE WITHOUT ESOPHAGITIS: Primary | ICD-10-CM

## 2025-04-18 DIAGNOSIS — R26.2 UNABLE TO AMBULATE: ICD-10-CM

## 2025-04-18 PROCEDURE — 99308 SBSQ NF CARE LOW MDM 20: CPT | Performed by: STUDENT IN AN ORGANIZED HEALTH CARE EDUCATION/TRAINING PROGRAM

## 2025-04-18 ASSESSMENT — ENCOUNTER SYMPTOMS
GASTROINTESTINAL NEGATIVE: 1
RESPIRATORY NEGATIVE: 1
WEAKNESS: 1
CARDIOVASCULAR NEGATIVE: 1
MUSCULOSKELETAL NEGATIVE: 1
FATIGUE: 1
PSYCHIATRIC NEGATIVE: 1

## 2025-04-18 NOTE — LETTER
Patient: Noemí Hinton  : 1928    Encounter Date: 2025    Subjective  Patient ID: Noemí Hinton is a 89 y.o. female.    Patient seen and examined on routine evaluation.  She regards that she is overall doing well however is working hard with physical therapy.  Endorses that she is still weak, getting better with transferring.  Is concerned if she will be able to walk by herself moving forward and what level of functioning she will ultimately achieve.  Otherwise, states that she is having some intermittent wrist pain.  No falls in nature, but states that this is pretty persistent.  States no additional issues at this time.         Review of Systems   Constitutional:  Positive for fatigue.   HENT: Negative.     Respiratory: Negative.     Cardiovascular: Negative.    Gastrointestinal: Negative.    Musculoskeletal: Negative.    Neurological:  Positive for weakness.   Psychiatric/Behavioral: Negative.         ObjectiveVitals Reviewed via facility EMR   Physical Exam  Constitutional:       General: She is not in acute distress.     Appearance: She is not ill-appearing.   Eyes:      Pupils: Pupils are equal, round, and reactive to light.   Cardiovascular:      Rate and Rhythm: Normal rate and regular rhythm.      Pulses: Normal pulses.      Heart sounds: No murmur heard.  Pulmonary:      Effort: No respiratory distress.      Breath sounds: No wheezing.   Abdominal:      General: Abdomen is flat. Bowel sounds are normal. There is no distension.   Musculoskeletal:      Right lower leg: No edema.      Left lower leg: No edema.   Skin:     General: Skin is warm and dry.   Neurological:      Mental Status: She is alert. Mental status is at baseline.      Cranial Nerves: No cranial nerve deficit.      Motor: Weakness present.   Psychiatric:         Mood and Affect: Mood normal.         Behavior: Behavior normal.         Assessment/Plan  Diagnoses and all orders for this visit:  Gastroesophageal reflux disease  without esophagitis  Atrial fibrillation, unspecified type (Multi)  Stage 3a chronic kidney disease (Multi)  Unable to ambulate    Patient seen and examined at bedside.  Overall medically stable, she does have some underlying wrist pain although there is no visible signs of injury and no recent falls we will just get x-rays to rule out any additional causes.  She does seem clinically anxious/depressed, provide on evaluation, continue closely monitor.  Consider SSRI or SNRI if this worsens.  Otherwise, additional changes encouraged optimization with physical therapy ultimately may require this facility moving forward.     Reviewed and approved by TALON CAMARA on 4/18/25 at 3:42 PM.       Electronically Signed By: Talon Camara DO   4/18/25  3:42 PM

## 2025-04-18 NOTE — PROGRESS NOTES
Subjective   Patient ID: Noemí Hinton is a 89 y.o. female.    Patient seen and examined on routine evaluation.  She regards that she is overall doing well however is working hard with physical therapy.  Endorses that she is still weak, getting better with transferring.  Is concerned if she will be able to walk by herself moving forward and what level of functioning she will ultimately achieve.  Otherwise, states that she is having some intermittent wrist pain.  No falls in nature, but states that this is pretty persistent.  States no additional issues at this time.         Review of Systems   Constitutional:  Positive for fatigue.   HENT: Negative.     Respiratory: Negative.     Cardiovascular: Negative.    Gastrointestinal: Negative.    Musculoskeletal: Negative.    Neurological:  Positive for weakness.   Psychiatric/Behavioral: Negative.         Objective Vitals Reviewed via facility EMR   Physical Exam  Constitutional:       General: She is not in acute distress.     Appearance: She is not ill-appearing.   Eyes:      Pupils: Pupils are equal, round, and reactive to light.   Cardiovascular:      Rate and Rhythm: Normal rate and regular rhythm.      Pulses: Normal pulses.      Heart sounds: No murmur heard.  Pulmonary:      Effort: No respiratory distress.      Breath sounds: No wheezing.   Abdominal:      General: Abdomen is flat. Bowel sounds are normal. There is no distension.   Musculoskeletal:      Right lower leg: No edema.      Left lower leg: No edema.   Skin:     General: Skin is warm and dry.   Neurological:      Mental Status: She is alert. Mental status is at baseline.      Cranial Nerves: No cranial nerve deficit.      Motor: Weakness present.   Psychiatric:         Mood and Affect: Mood normal.         Behavior: Behavior normal.         Assessment/Plan   Diagnoses and all orders for this visit:  Gastroesophageal reflux disease without esophagitis  Atrial fibrillation, unspecified type (Multi)  Stage  3a chronic kidney disease (Multi)  Unable to ambulate    Patient seen and examined at bedside.  Overall medically stable, she does have some underlying wrist pain although there is no visible signs of injury and no recent falls we will just get x-rays to rule out any additional causes.  She does seem clinically anxious/depressed, provide on evaluation, continue closely monitor.  Consider SSRI or SNRI if this worsens.  Otherwise, additional changes encouraged optimization with physical therapy ultimately may require this facility moving forward.     Reviewed and approved by RICHARD CAMARA on 4/18/25 at 3:42 PM.

## 2025-04-23 ENCOUNTER — NURSING HOME VISIT (OUTPATIENT)
Dept: POST ACUTE CARE | Facility: EXTERNAL LOCATION | Age: OVER 89
End: 2025-04-23
Payer: MEDICARE

## 2025-04-23 DIAGNOSIS — Z75.8 DISCHARGE PLANNING ISSUES: ICD-10-CM

## 2025-04-23 DIAGNOSIS — I10 PRIMARY HYPERTENSION: ICD-10-CM

## 2025-04-23 DIAGNOSIS — R26.2 UNABLE TO AMBULATE: ICD-10-CM

## 2025-04-23 DIAGNOSIS — I48.91 ATRIAL FIBRILLATION, UNSPECIFIED TYPE (MULTI): Primary | ICD-10-CM

## 2025-04-23 DIAGNOSIS — N18.31 STAGE 3A CHRONIC KIDNEY DISEASE (MULTI): ICD-10-CM

## 2025-04-23 PROCEDURE — 99309 SBSQ NF CARE MODERATE MDM 30: CPT | Performed by: STUDENT IN AN ORGANIZED HEALTH CARE EDUCATION/TRAINING PROGRAM

## 2025-04-23 ASSESSMENT — ENCOUNTER SYMPTOMS
FATIGUE: 1
RESPIRATORY NEGATIVE: 1
WEAKNESS: 1
CARDIOVASCULAR NEGATIVE: 1
GASTROINTESTINAL NEGATIVE: 1
PSYCHIATRIC NEGATIVE: 1
MUSCULOSKELETAL NEGATIVE: 1

## 2025-04-23 NOTE — LETTER
Patient: Noemí Hinton  : 1928    Encounter Date: 2025    Subjective  Patient ID: Noemí Hinton is a 89 y.o. female.    Patient seen and examined on routine evaluation.  She states that she is overall doing well, however is concerned about ongoing.  She likely will be discharged from the facility in the next couple of days.  Will be going home and living with her daughter, in regards that she does have equipment at home that the daughter has been purchasing.  She does still have multiple needs, due to her ambulation issues so likely will need help with transferring as well as multiple specks of daily living.  Does feel like her daughter as well as herself can handle this.  Otherwise, no additional issues at this time patient did request I also updated her daughter.           Review of Systems   Constitutional:  Positive for fatigue.   HENT: Negative.     Respiratory: Negative.     Cardiovascular: Negative.    Gastrointestinal: Negative.    Musculoskeletal: Negative.    Neurological:  Positive for weakness.   Psychiatric/Behavioral: Negative.         ObjectiveVitals Reviewed via facility EMR   Physical Exam  Constitutional:       General: She is not in acute distress.     Appearance: She is not ill-appearing.      Comments: In bedside chair   Eyes:      Pupils: Pupils are equal, round, and reactive to light.   Cardiovascular:      Rate and Rhythm: Normal rate and regular rhythm.      Pulses: Normal pulses.      Heart sounds: No murmur heard.  Pulmonary:      Effort: No respiratory distress.      Breath sounds: No wheezing.   Abdominal:      General: Abdomen is flat. Bowel sounds are normal. There is no distension.   Musculoskeletal:      Right lower leg: No edema.      Left lower leg: No edema.   Skin:     General: Skin is warm and dry.   Neurological:      Mental Status: She is alert. Mental status is at baseline.      Cranial Nerves: No cranial nerve deficit.      Motor: Weakness present.    Psychiatric:         Mood and Affect: Mood normal.         Behavior: Behavior normal.         Assessment/Plan  Diagnoses and all orders for this visit:  Atrial fibrillation, unspecified type (Multi)  Primary hypertension  Stage 3a chronic kidney disease (Multi)  Unable to ambulate  Discharge planning issues    Patient seen and examined at bedside.  Overall medically stable for discharge home health care.  Patient does have multiple needs, however daughter will be present most of the time, so potentially will be able to achieve these with DME.  Did discuss multiple options with the family such as moving into assisted living, as well as private aides etc. as well as home health care.  They are planning on home health care moving forward.  Potentially will move forward if appropriate as well.  Discussed with family, to make sure that they are appropriately trained on DME devices as these potentially can be very dangerous if they are not used correctly.  They voiced understanding with regards to this.  Otherwise medically stable for discharge.  Encouraged follow-up with PCP.     Reviewed and approved by TALON CAMARA on 4/23/25 at 10:38 PM.       Electronically Signed By: Talon Camara DO   4/23/25 10:38 PM

## 2025-04-24 NOTE — PROGRESS NOTES
Subjective   Patient ID: Noemí Hinton is a 89 y.o. female.    Patient seen and examined on routine evaluation.  She states that she is overall doing well, however is concerned about ongoing.  She likely will be discharged from the facility in the next couple of days.  Will be going home and living with her daughter, in regards that she does have equipment at home that the daughter has been purchasing.  She does still have multiple needs, due to her ambulation issues so likely will need help with transferring as well as multiple specks of daily living.  Does feel like her daughter as well as herself can handle this.  Otherwise, no additional issues at this time patient did request I also updated her daughter.           Review of Systems   Constitutional:  Positive for fatigue.   HENT: Negative.     Respiratory: Negative.     Cardiovascular: Negative.    Gastrointestinal: Negative.    Musculoskeletal: Negative.    Neurological:  Positive for weakness.   Psychiatric/Behavioral: Negative.         Objective Vitals Reviewed via facility EMR   Physical Exam  Constitutional:       General: She is not in acute distress.     Appearance: She is not ill-appearing.      Comments: In bedside chair   Eyes:      Pupils: Pupils are equal, round, and reactive to light.   Cardiovascular:      Rate and Rhythm: Normal rate and regular rhythm.      Pulses: Normal pulses.      Heart sounds: No murmur heard.  Pulmonary:      Effort: No respiratory distress.      Breath sounds: No wheezing.   Abdominal:      General: Abdomen is flat. Bowel sounds are normal. There is no distension.   Musculoskeletal:      Right lower leg: No edema.      Left lower leg: No edema.   Skin:     General: Skin is warm and dry.   Neurological:      Mental Status: She is alert. Mental status is at baseline.      Cranial Nerves: No cranial nerve deficit.      Motor: Weakness present.   Psychiatric:         Mood and Affect: Mood normal.         Behavior: Behavior  normal.         Assessment/Plan   Diagnoses and all orders for this visit:  Atrial fibrillation, unspecified type (Multi)  Primary hypertension  Stage 3a chronic kidney disease (Multi)  Unable to ambulate  Discharge planning issues    Patient seen and examined at bedside.  Overall medically stable for discharge home health care.  Patient does have multiple needs, however daughter will be present most of the time, so potentially will be able to achieve these with DME.  Did discuss multiple options with the family such as moving into assisted living, as well as private aides etc. as well as home health care.  They are planning on home health care moving forward.  Potentially will move forward if appropriate as well.  Discussed with family, to make sure that they are appropriately trained on DME devices as these potentially can be very dangerous if they are not used correctly.  They voiced understanding with regards to this.  Otherwise medically stable for discharge.  Encouraged follow-up with PCP.     Reviewed and approved by RICHARD CAMARA on 4/23/25 at 10:38 PM.

## 2025-05-07 ENCOUNTER — OFFICE VISIT (OUTPATIENT)
Dept: ORTHOPEDIC SURGERY | Facility: CLINIC | Age: OVER 89
End: 2025-05-07
Payer: MEDICARE

## 2025-05-07 DIAGNOSIS — M17.11 PRIMARY OSTEOARTHRITIS OF RIGHT KNEE: Primary | ICD-10-CM

## 2025-05-07 DIAGNOSIS — S72.431A DISPLACED FRACTURE OF MEDIAL CONDYLE OF RIGHT FEMUR, INITIAL ENCOUNTER FOR CLOSED FRACTURE: ICD-10-CM

## 2025-05-07 PROCEDURE — 1159F MED LIST DOCD IN RCRD: CPT | Performed by: STUDENT IN AN ORGANIZED HEALTH CARE EDUCATION/TRAINING PROGRAM

## 2025-05-07 PROCEDURE — 99202 OFFICE O/P NEW SF 15 MIN: CPT | Performed by: STUDENT IN AN ORGANIZED HEALTH CARE EDUCATION/TRAINING PROGRAM

## 2025-05-07 PROCEDURE — 1036F TOBACCO NON-USER: CPT | Performed by: STUDENT IN AN ORGANIZED HEALTH CARE EDUCATION/TRAINING PROGRAM

## 2025-05-07 PROCEDURE — 99024 POSTOP FOLLOW-UP VISIT: CPT | Performed by: STUDENT IN AN ORGANIZED HEALTH CARE EDUCATION/TRAINING PROGRAM

## 2025-05-07 NOTE — PROGRESS NOTES
History of Present Illness  Patient returns today for evaluation of her right leg.  She notes a mild increase in pain but is relatively limited based off of medicine she is able to take secondary to Eliquis use for stent placement, kidney problems, and side effects to certain medications.  The patient denies any significant numbness or tingling of calf pain.  She has been wearing her brace.     Physical Examination:  Right knee:  Skin healthy and intact  Swelling noted, mild  Tenderness: mild   Mild valgus deformity noted  Intact flexion and extension of digits  Mild loss of motion the level of the knee compared to contralateral side  Neurovascular exam normal distally  2+ dorsalis pedis pulse and good cap refill     Radiographs  Demonstrate a healing medial femoral condyle fracture     Assessment:  Patient with a right medial femoral condyle fracture     Plan:   Immobilization: Wean away from brace.   Encouraged formal physical therapy and Occupational Therapy for range of motion and strength.  Weightbearing as tolerated  Reviewed rehab /return to activities  Discussed Tylenol as needed for pain, topical options including diclofenac as well as lidocaine patches.  Follow up 2 months for final radiographs    Brenton Pena PA-C

## 2025-07-10 ENCOUNTER — OFFICE VISIT (OUTPATIENT)
Dept: ORTHOPEDIC SURGERY | Facility: CLINIC | Age: OVER 89
End: 2025-07-10
Payer: MEDICARE

## 2025-07-10 ENCOUNTER — HOSPITAL ENCOUNTER (OUTPATIENT)
Dept: RADIOLOGY | Facility: CLINIC | Age: OVER 89
Discharge: HOME | End: 2025-07-10
Payer: MEDICARE

## 2025-07-10 DIAGNOSIS — S72.431A DISPLACED FRACTURE OF MEDIAL CONDYLE OF RIGHT FEMUR, INITIAL ENCOUNTER FOR CLOSED FRACTURE: ICD-10-CM

## 2025-07-10 PROCEDURE — 99213 OFFICE O/P EST LOW 20 MIN: CPT | Performed by: ORTHOPAEDIC SURGERY

## 2025-07-10 PROCEDURE — 73560 X-RAY EXAM OF KNEE 1 OR 2: CPT | Mod: RT

## 2025-07-10 NOTE — PROGRESS NOTES
History of Present Illness  Patient returns today for evaluation of the right knee.  The patient notes improvement in pain.  The patient denies any significant numbness or tingling of calf pain.  She has some known osteoarthritis    Physical Examination:  Right knee:  Skin healthy and intact  Swelling noted   Tenderness: mild   Intact flexion and extension of digits  Neurovascular exam normal distally  2+ dorsalis pedis pulse and good cap refill    Radiographs  Demonstrate a healed fracture severe patellofemoral arthritis    Assessment:  Patient with a medial femoral condyle fracture the setting patella arthritis    Plan:   Immobilization: None, okay to return to normal activities as fracture appears healed  Reviewed rehab /return to activities  Follow up Not needed at this time